# Patient Record
Sex: MALE | Race: WHITE | HISPANIC OR LATINO
[De-identification: names, ages, dates, MRNs, and addresses within clinical notes are randomized per-mention and may not be internally consistent; named-entity substitution may affect disease eponyms.]

---

## 2017-03-16 ENCOUNTER — APPOINTMENT (OUTPATIENT)
Dept: ELECTROPHYSIOLOGY | Facility: CLINIC | Age: 67
End: 2017-03-16

## 2017-06-20 ENCOUNTER — APPOINTMENT (OUTPATIENT)
Dept: ELECTROPHYSIOLOGY | Facility: CLINIC | Age: 67
End: 2017-06-20

## 2017-06-20 ENCOUNTER — NON-APPOINTMENT (OUTPATIENT)
Age: 67
End: 2017-06-20

## 2017-06-20 VITALS
BODY MASS INDEX: 21.7 KG/M2 | RESPIRATION RATE: 16 BRPM | HEART RATE: 42 BPM | SYSTOLIC BLOOD PRESSURE: 152 MMHG | HEIGHT: 71 IN | WEIGHT: 155 LBS | DIASTOLIC BLOOD PRESSURE: 86 MMHG | OXYGEN SATURATION: 98 %

## 2017-09-28 ENCOUNTER — APPOINTMENT (OUTPATIENT)
Dept: ELECTROPHYSIOLOGY | Facility: CLINIC | Age: 67
End: 2017-09-28
Payer: COMMERCIAL

## 2017-09-28 PROCEDURE — 93295 DEV INTERROG REMOTE 1/2/MLT: CPT

## 2017-09-28 PROCEDURE — 93296 REM INTERROG EVL PM/IDS: CPT

## 2017-12-14 ENCOUNTER — APPOINTMENT (OUTPATIENT)
Dept: ELECTROPHYSIOLOGY | Facility: CLINIC | Age: 67
End: 2017-12-14
Payer: COMMERCIAL

## 2017-12-14 VITALS — DIASTOLIC BLOOD PRESSURE: 89 MMHG | SYSTOLIC BLOOD PRESSURE: 165 MMHG | HEART RATE: 75 BPM

## 2017-12-14 DIAGNOSIS — Z86.74 PERSONAL HISTORY OF SUDDEN CARDIAC ARREST: ICD-10-CM

## 2017-12-14 PROCEDURE — 93282 PRGRMG EVAL IMPLANTABLE DFB: CPT

## 2018-03-12 ENCOUNTER — APPOINTMENT (OUTPATIENT)
Dept: ELECTROPHYSIOLOGY | Facility: CLINIC | Age: 68
End: 2018-03-12
Payer: COMMERCIAL

## 2018-03-12 PROCEDURE — 93296 REM INTERROG EVL PM/IDS: CPT

## 2018-03-12 PROCEDURE — 93295 DEV INTERROG REMOTE 1/2/MLT: CPT

## 2018-06-05 ENCOUNTER — APPOINTMENT (OUTPATIENT)
Dept: ELECTROPHYSIOLOGY | Facility: CLINIC | Age: 68
End: 2018-06-05
Payer: COMMERCIAL

## 2018-06-05 PROCEDURE — 93282 PRGRMG EVAL IMPLANTABLE DFB: CPT

## 2018-10-02 ENCOUNTER — APPOINTMENT (OUTPATIENT)
Dept: ELECTROPHYSIOLOGY | Facility: CLINIC | Age: 68
End: 2018-10-02
Payer: COMMERCIAL

## 2018-10-02 PROCEDURE — 93295 DEV INTERROG REMOTE 1/2/MLT: CPT

## 2018-10-02 PROCEDURE — 93296 REM INTERROG EVL PM/IDS: CPT

## 2018-12-11 ENCOUNTER — APPOINTMENT (OUTPATIENT)
Dept: ELECTROPHYSIOLOGY | Facility: CLINIC | Age: 68
End: 2018-12-11
Payer: COMMERCIAL

## 2018-12-11 PROCEDURE — 93282 PRGRMG EVAL IMPLANTABLE DFB: CPT

## 2019-03-28 ENCOUNTER — APPOINTMENT (OUTPATIENT)
Dept: ELECTROPHYSIOLOGY | Facility: CLINIC | Age: 69
End: 2019-03-28
Payer: COMMERCIAL

## 2019-03-28 PROCEDURE — 93295 DEV INTERROG REMOTE 1/2/MLT: CPT

## 2019-03-28 PROCEDURE — 93296 REM INTERROG EVL PM/IDS: CPT

## 2019-05-16 ENCOUNTER — MESSAGE (OUTPATIENT)
Age: 69
End: 2019-05-16

## 2019-06-07 ENCOUNTER — APPOINTMENT (OUTPATIENT)
Dept: ELECTROPHYSIOLOGY | Facility: CLINIC | Age: 69
End: 2019-06-07

## 2019-06-28 ENCOUNTER — APPOINTMENT (OUTPATIENT)
Dept: ELECTROPHYSIOLOGY | Facility: CLINIC | Age: 69
End: 2019-06-28
Payer: COMMERCIAL

## 2019-06-28 PROCEDURE — 93295 DEV INTERROG REMOTE 1/2/MLT: CPT

## 2019-06-28 PROCEDURE — 93296 REM INTERROG EVL PM/IDS: CPT

## 2019-09-27 ENCOUNTER — APPOINTMENT (OUTPATIENT)
Dept: ELECTROPHYSIOLOGY | Facility: CLINIC | Age: 69
End: 2019-09-27
Payer: COMMERCIAL

## 2019-09-27 PROCEDURE — 93296 REM INTERROG EVL PM/IDS: CPT

## 2019-09-27 PROCEDURE — 93295 DEV INTERROG REMOTE 1/2/MLT: CPT

## 2019-12-31 ENCOUNTER — APPOINTMENT (OUTPATIENT)
Dept: ELECTROPHYSIOLOGY | Facility: CLINIC | Age: 69
End: 2019-12-31
Payer: COMMERCIAL

## 2019-12-31 PROCEDURE — 93296 REM INTERROG EVL PM/IDS: CPT

## 2019-12-31 PROCEDURE — 93295 DEV INTERROG REMOTE 1/2/MLT: CPT

## 2020-04-03 ENCOUNTER — APPOINTMENT (OUTPATIENT)
Dept: ELECTROPHYSIOLOGY | Facility: CLINIC | Age: 70
End: 2020-04-03
Payer: COMMERCIAL

## 2020-04-03 PROCEDURE — 93296 REM INTERROG EVL PM/IDS: CPT

## 2020-04-03 PROCEDURE — 93295 DEV INTERROG REMOTE 1/2/MLT: CPT

## 2020-07-09 ENCOUNTER — APPOINTMENT (OUTPATIENT)
Dept: ELECTROPHYSIOLOGY | Facility: CLINIC | Age: 70
End: 2020-07-09
Payer: COMMERCIAL

## 2020-07-09 PROCEDURE — 93296 REM INTERROG EVL PM/IDS: CPT

## 2020-07-09 PROCEDURE — 93295 DEV INTERROG REMOTE 1/2/MLT: CPT

## 2020-10-09 ENCOUNTER — APPOINTMENT (OUTPATIENT)
Dept: ELECTROPHYSIOLOGY | Facility: CLINIC | Age: 70
End: 2020-10-09
Payer: COMMERCIAL

## 2020-10-09 PROCEDURE — 93295 DEV INTERROG REMOTE 1/2/MLT: CPT

## 2020-10-09 PROCEDURE — 93296 REM INTERROG EVL PM/IDS: CPT

## 2021-01-14 ENCOUNTER — APPOINTMENT (OUTPATIENT)
Dept: ELECTROPHYSIOLOGY | Facility: CLINIC | Age: 71
End: 2021-01-14
Payer: COMMERCIAL

## 2021-01-14 PROCEDURE — 93295 DEV INTERROG REMOTE 1/2/MLT: CPT

## 2021-01-14 PROCEDURE — 93296 REM INTERROG EVL PM/IDS: CPT

## 2021-03-04 ENCOUNTER — NON-APPOINTMENT (OUTPATIENT)
Age: 71
End: 2021-03-04

## 2021-03-04 ENCOUNTER — INPATIENT (INPATIENT)
Facility: HOSPITAL | Age: 71
LOS: 4 days | Discharge: HOME | End: 2021-03-09
Attending: INTERNAL MEDICINE | Admitting: INTERNAL MEDICINE
Payer: COMMERCIAL

## 2021-03-04 VITALS
SYSTOLIC BLOOD PRESSURE: 159 MMHG | DIASTOLIC BLOOD PRESSURE: 79 MMHG | HEIGHT: 72 IN | OXYGEN SATURATION: 99 % | TEMPERATURE: 98 F | HEART RATE: 75 BPM | RESPIRATION RATE: 16 BRPM

## 2021-03-04 DIAGNOSIS — I42.8 OTHER CARDIOMYOPATHIES: ICD-10-CM

## 2021-03-04 DIAGNOSIS — R62.7 ADULT FAILURE TO THRIVE: ICD-10-CM

## 2021-03-04 DIAGNOSIS — Z29.9 ENCOUNTER FOR PROPHYLACTIC MEASURES, UNSPECIFIED: ICD-10-CM

## 2021-03-04 DIAGNOSIS — Z95.810 PRESENCE OF AUTOMATIC (IMPLANTABLE) CARDIAC DEFIBRILLATOR: Chronic | ICD-10-CM

## 2021-03-04 DIAGNOSIS — I47.1 SUPRAVENTRICULAR TACHYCARDIA: ICD-10-CM

## 2021-03-04 LAB
ALBUMIN SERPL ELPH-MCNC: 4.7 G/DL — SIGNIFICANT CHANGE UP (ref 3.3–5)
ALP SERPL-CCNC: 43 U/L — SIGNIFICANT CHANGE UP (ref 40–120)
ALT FLD-CCNC: 20 U/L — SIGNIFICANT CHANGE UP (ref 4–41)
ANION GAP SERPL CALC-SCNC: 15 MMOL/L — HIGH (ref 7–14)
AST SERPL-CCNC: 23 U/L — SIGNIFICANT CHANGE UP (ref 4–40)
BASOPHILS # BLD AUTO: 0.08 K/UL — SIGNIFICANT CHANGE UP (ref 0–0.2)
BASOPHILS NFR BLD AUTO: 0.8 % — SIGNIFICANT CHANGE UP (ref 0–2)
BILIRUB SERPL-MCNC: 0.7 MG/DL — SIGNIFICANT CHANGE UP (ref 0.2–1.2)
BUN SERPL-MCNC: 18 MG/DL — SIGNIFICANT CHANGE UP (ref 7–23)
CALCIUM SERPL-MCNC: 9.5 MG/DL — SIGNIFICANT CHANGE UP (ref 8.4–10.5)
CHLORIDE SERPL-SCNC: 95 MMOL/L — LOW (ref 98–107)
CO2 SERPL-SCNC: 23 MMOL/L — SIGNIFICANT CHANGE UP (ref 22–31)
CREAT SERPL-MCNC: 1.02 MG/DL — SIGNIFICANT CHANGE UP (ref 0.5–1.3)
EOSINOPHIL # BLD AUTO: 0.01 K/UL — SIGNIFICANT CHANGE UP (ref 0–0.5)
EOSINOPHIL NFR BLD AUTO: 0.1 % — SIGNIFICANT CHANGE UP (ref 0–6)
GLUCOSE SERPL-MCNC: 105 MG/DL — HIGH (ref 70–99)
HCT VFR BLD CALC: 45.8 % — SIGNIFICANT CHANGE UP (ref 39–50)
HGB BLD-MCNC: 15.1 G/DL — SIGNIFICANT CHANGE UP (ref 13–17)
IANC: 7.45 K/UL — SIGNIFICANT CHANGE UP (ref 1.5–8.5)
IMM GRANULOCYTES NFR BLD AUTO: 0.3 % — SIGNIFICANT CHANGE UP (ref 0–1.5)
LYMPHOCYTES # BLD AUTO: 1.42 K/UL — SIGNIFICANT CHANGE UP (ref 1–3.3)
LYMPHOCYTES # BLD AUTO: 14.7 % — SIGNIFICANT CHANGE UP (ref 13–44)
MAGNESIUM SERPL-MCNC: 2.3 MG/DL — SIGNIFICANT CHANGE UP (ref 1.6–2.6)
MCHC RBC-ENTMCNC: 29.9 PG — SIGNIFICANT CHANGE UP (ref 27–34)
MCHC RBC-ENTMCNC: 33 GM/DL — SIGNIFICANT CHANGE UP (ref 32–36)
MCV RBC AUTO: 90.7 FL — SIGNIFICANT CHANGE UP (ref 80–100)
MONOCYTES # BLD AUTO: 0.64 K/UL — SIGNIFICANT CHANGE UP (ref 0–0.9)
MONOCYTES NFR BLD AUTO: 6.6 % — SIGNIFICANT CHANGE UP (ref 2–14)
NEUTROPHILS # BLD AUTO: 7.45 K/UL — HIGH (ref 1.8–7.4)
NEUTROPHILS NFR BLD AUTO: 77.5 % — HIGH (ref 43–77)
NRBC # BLD: 0 /100 WBCS — SIGNIFICANT CHANGE UP
NRBC # FLD: 0 K/UL — SIGNIFICANT CHANGE UP
PLATELET # BLD AUTO: 210 K/UL — SIGNIFICANT CHANGE UP (ref 150–400)
POTASSIUM SERPL-MCNC: 4.4 MMOL/L — SIGNIFICANT CHANGE UP (ref 3.5–5.3)
POTASSIUM SERPL-SCNC: 4.4 MMOL/L — SIGNIFICANT CHANGE UP (ref 3.5–5.3)
PROT SERPL-MCNC: 7.2 G/DL — SIGNIFICANT CHANGE UP (ref 6–8.3)
RBC # BLD: 5.05 M/UL — SIGNIFICANT CHANGE UP (ref 4.2–5.8)
RBC # FLD: 12.6 % — SIGNIFICANT CHANGE UP (ref 10.3–14.5)
SODIUM SERPL-SCNC: 133 MMOL/L — LOW (ref 135–145)
TROPONIN T, HIGH SENSITIVITY RESULT: 16 NG/L — SIGNIFICANT CHANGE UP
TSH SERPL-MCNC: 2.44 UIU/ML — SIGNIFICANT CHANGE UP (ref 0.27–4.2)
WBC # BLD: 9.63 K/UL — SIGNIFICANT CHANGE UP (ref 3.8–10.5)
WBC # FLD AUTO: 9.63 K/UL — SIGNIFICANT CHANGE UP (ref 3.8–10.5)

## 2021-03-04 PROCEDURE — 99223 1ST HOSP IP/OBS HIGH 75: CPT

## 2021-03-04 PROCEDURE — 99285 EMERGENCY DEPT VISIT HI MDM: CPT | Mod: 25,GC

## 2021-03-04 PROCEDURE — 93010 ELECTROCARDIOGRAM REPORT: CPT | Mod: NC

## 2021-03-04 PROCEDURE — 99233 SBSQ HOSP IP/OBS HIGH 50: CPT

## 2021-03-04 PROCEDURE — 93281 PM DEVICE PROGR EVAL MULTI: CPT | Mod: 26

## 2021-03-04 PROCEDURE — 71046 X-RAY EXAM CHEST 2 VIEWS: CPT | Mod: 26

## 2021-03-04 RX ORDER — METOPROLOL TARTRATE 50 MG
100 TABLET ORAL DAILY
Refills: 0 | Status: DISCONTINUED | OUTPATIENT
Start: 2021-03-04 | End: 2021-03-04

## 2021-03-04 RX ORDER — ATORVASTATIN CALCIUM 80 MG/1
10 TABLET, FILM COATED ORAL AT BEDTIME
Refills: 0 | Status: DISCONTINUED | OUTPATIENT
Start: 2021-03-04 | End: 2021-03-09

## 2021-03-04 RX ORDER — ASPIRIN/CALCIUM CARB/MAGNESIUM 324 MG
81 TABLET ORAL DAILY
Refills: 0 | Status: DISCONTINUED | OUTPATIENT
Start: 2021-03-04 | End: 2021-03-09

## 2021-03-04 RX ORDER — LISINOPRIL 2.5 MG/1
10 TABLET ORAL DAILY
Refills: 0 | Status: DISCONTINUED | OUTPATIENT
Start: 2021-03-04 | End: 2021-03-07

## 2021-03-04 RX ORDER — ENOXAPARIN SODIUM 100 MG/ML
40 INJECTION SUBCUTANEOUS DAILY
Refills: 0 | Status: DISCONTINUED | OUTPATIENT
Start: 2021-03-04 | End: 2021-03-09

## 2021-03-04 RX ORDER — METOPROLOL TARTRATE 50 MG
100 TABLET ORAL DAILY
Refills: 0 | Status: DISCONTINUED | OUTPATIENT
Start: 2021-03-04 | End: 2021-03-05

## 2021-03-04 NOTE — ED PROVIDER NOTE - CARE PLAN
Principal Discharge DX:	SVT (supraventricular tachycardia)   Principal Discharge DX:	SVT (supraventricular tachycardia)  Secondary Diagnosis:	AICD discharge

## 2021-03-04 NOTE — H&P ADULT - PROBLEM SELECTOR PLAN 3
-Pt w/ poor PO intake 2/2 to mood, recent life events  -mild hyponatremia   -encourage PO intake   -nutrition eval

## 2021-03-04 NOTE — H&P ADULT - ASSESSMENT
70yoM w/ PMHx NICM s/p single lead [place] [employer] AICD, HTN presents after AICD fire found to have episode of SVT w/ inappropriate therapy admitted for further eval

## 2021-03-04 NOTE — ED ADULT NURSE NOTE - OBJECTIVE STATEMENT
Abdi RN: Received pt in room 19, pt A&Ox4, respirations even and unlabored b/l. Pt reports felt a shock from defibrillator on 2/28, was told to come to ED by cardiologist. Pt c/o generalized weakness, states "but I'm not sure if it's new because I always feel weak." Pt denies chest pain, SOB, dizziness/lightheadedness, n/v. Pt with defibrillator. PMHx htn, cardiomyopathy, 38% EF, cardiac arrest. Sinus rhythm on cardiac monitor. Cardiology NP at bedside for eval. Awaiting orders. Appears in no apparent distress. Report endorsed to primary RN Sarah.

## 2021-03-04 NOTE — H&P ADULT - PROBLEM SELECTOR PLAN 2
Psychiatric Progress Note





- Psychiatric Progress Note


Patient seen today, length of contact: Patient evaluated, case discussed with 

team, chart reviewed


Patient Chief Complaint: 


"I'm okay."


Problems Identified/Issues Discussed: 


Patient reports less depression.   He has broader range of affect.  We 

discussed possible discharge on Monday if he continues to improve clinically.  

He reports improved appetite and sleep.  We discussed continued titration of 

Remeron, no current adverse effects to medications noted.


Medication Change: Yes (Increase Remeron to 30 mg PO HS)


Medical Record Reviewed: Yes


Consults ordered or reviewed: 


Medicine consult, Physical Therapy consult





Psychology consult 1/12/18





Pt is a 72 year old male admitted to the geropsych unit and referred to the 

writer for evaluation.  On the DRS, pt scored an overall score of 109. pt 

scored within normal limits on Attention, Construction and Conceptualization 

tasks.  Pt's Initiation and Memory skills fell in the Deficient Range.





Overall  109


Attention  34


Memory  15


Initiation  20


Construction  4


Conceptualization  34





Deficits evident with patient requiring assistance in the future,


thank you for this referral,


Dr. White





Mental Status Examination





- Cognitive Function


Orientation: Person, Place, Situation, Time


Memory: Impaired


Association: WNL


Fund of Knowledge: WNL


Decription of patient's judgement and insights: 


Fair I/J





- Mood


Mood: Depressed





- Affect


Affect: Depressed





- Speech


Speech: Appropriate





- Formal Thought Process


Formal Thought Process: No Impairment


Psychotic Thoughts and Behaviors: 


No AH/VH/paranoia/delusions





- Suicidal Ideation


Suicidal Ideation: No





- Homicidal Ideation


Homicidal Ideation: No





Goal/Treatment Plan





- Goal/Treatment Plan


Need for Continued Stay: Severe depression anxiety, Discharge may exacerbated 

symptoms


Progress Toward Problem(s) and Goals/Treatment Plan: 


Major Depressive Disorder, Severe; patient needs continued hospitalization for 

treatment and safety





-Individual and group therapy


-Increase Remeron to 30 mg PO HS 


-Medicine consult


-Physical therapy consult


-Psychology consult


-Disposition planning


-Collateral history obtained from patient's son; will start disposition 

planning w/ son


-Nicotine patch


Estimated Date of D/C: 01/22/18





- Smoking Cessation


Smoking Cessation Initiated: Yes -continue with lisinopril, metoprolol, statin  -F/u TTE

## 2021-03-04 NOTE — CONSULT NOTE ADULT - ASSESSMENT
70yoM w/ PMHx NICM s/p single lead [employer] AICD, HTN presents with s/p AICD fire presents after AICD fire on 2/28 and one episode of tachycardia today 3/4, broke after one successful ATP.  Patient states on 2/28, he woke up feeling short of breath and weak, when he stood up was when he felt the shock and has since felt very weak.  Remote monitoring alerted outpatient cardiology office today and patient was advised to go the ER.  Denies any changes in activity level, chest pain, lifestyle, medication, palpitations, lightheadedness, dizziness, LOC, fever, chills.  States his wife passed away about 1 year ago (not COVID related) and in recent weeks has seen a decrease in his appetite.     70yoM w/ PMHx NICM s/p single lead [place] [employer] AICD, HTN presents with s/p AICD fire presents after AICD fire on 2/28 and one episode of tachycardia today 3/4, broke after one successful ATP.  Patient states on 2/28, he woke up feeling short of breath and weak, when he stood up was when he felt the shock and has since felt very weak.  Remote monitoring alerted outpatient cardiology office today and patient was advised to go the ER.  Denies any changes in activity level, chest pain, lifestyle, medication, palpitations, lightheadedness, dizziness, LOC, fever, chills.  States his wife passed away about 1 year ago (not COVID related) and in recent weeks has seen a decrease in his appetite.      Interrogation revealed episodes of SVT with inappropriate therapy   SVT likely suspected to be afib/aflutter - would start anticoagulation for CHADs-VASC score: 3   TTE to evaluate valves and LV/RV function  Continue Toprol 100mg PO daily   Monitor on telemetry

## 2021-03-04 NOTE — ED ADULT TRIAGE NOTE - BP NONINVASIVE DIASTOLIC (MM HG)
79 [Maximal Pain Intensity: 7/10] : 7/10 [Least Pain Intensity: 3/10] : 3/10 [Pain Description/Quality: ___] : Pain description/quality: [unfilled] [Pain Duration: ___] : Pain duration: [unfilled] [Pain Location: ___] : Pain Location: [unfilled] [Pain Interferes with ADLs] : Pain interferes with activities of daily living. [Opioid] : opioid [Adjuvant (neuroleptics)] : adjuvant (neuroleptics) [70: Cares for self; unalbe to carry on normal activity or do active work.] : 70: Cares for self; unable to carry on normal activity or do active work. [ECOG Performance Status: 2 - Ambulatory and capable of all self care but unable to carry out any work activities] : Performance Status: 2 - Ambulatory and capable of all self care but unable to carry out any work activities. Up and about more than 50% of waking hours

## 2021-03-04 NOTE — ED ADULT TRIAGE NOTE - CHIEF COMPLAINT QUOTE
Per EMS Pt w/ hx of htn cardiac arrest ,cardiomyopathy,  Pt's defibrillator alerted cardiologist to arrhythmia Pt denies palpitations chest pain SOB, c/o general unwell feeling.

## 2021-03-04 NOTE — CONSULT NOTE ADULT - SUBJECTIVE AND OBJECTIVE BOX
Date of Admission: 3/4/2021    CHIEF COMPLAINT: AICD fire    HISTORY OF PRESENT ILLNESS:  This is a 70yoM w/ PMHx NICM s/p single lead [employer] [employer] AICD, HTN presents with s/p AICD fire presents after AICD fire on 2/28 and one episode of tachycardia today 3/4, broke after one successful ATP.  Patient states on 2/28, he woke up feeling short of breath and weak, when he stood up was when he felt the shock and has since felt very weak.  Remote monitoring alerted outpatient cardiology office today and patient was advised to go the ER.  Denies any changes in activity level, chest pain, lifestyle, medication, palpitations, lightheadedness, dizziness, LOC, fever, chills.  States his wife passed away about 1 year ago (not COVID related) and in recent weeks has seen a decrease in his appetite.      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  Has been maintained on toprol 100mg PO since.  One episode of tachycardia that met requirements for tachy therapy noted on remote telemetry in 2019 for which patient was notified but patient has not followed up with outpatient clinic since 2017.      Allergies  No Known Allergies  Intolerances    	    MEDICATIONS:                  PAST MEDICAL & SURGICAL HISTORY:      FAMILY HISTORY:      SOCIAL HISTORY:    [ ] Non-smoker  [ ] Smoker  [ ] Alcohol      REVIEW OF SYSTEMS:  See HPI. Otherwise, 10 point ROS done and otherwise negative.    PHYSICAL EXAM:  T(C): 36.9 (03-04-21 @ 16:01), Max: 37.1 (03-04-21 @ 15:55)  HR: 78 (03-04-21 @ 16:01) (75 - 88)  BP: 174/87 (03-04-21 @ 16:01) (159/79 - 175/88)  RR: 18 (03-04-21 @ 16:01) (16 - 18)  SpO2: 100% (03-04-21 @ 16:01) (99% - 100%)  Wt(kg): --  I&O's Summary      Appearance: Normal	  HEENT:   Normal oral mucosa, PERRL, EOMI	  Lymphatic: No lymphadenopathy  Cardiovascular: Normal S1 S2, No JVD, No murmurs, No edema  Respiratory: Lungs clear to auscultation	  Psychiatry: A & O x 3, Mood & affect appropriate  Gastrointestinal:  Soft, Non-tender, + BS	  Skin: No rashes, No ecchymoses, No cyanosis	  Neurologic: Non-focal  Extremities: Normal range of motion, No clubbing, cyanosis or edema  Vascular: Peripheral pulses palpable 2+ bilaterally        LABS:	 	    CBC Full  -  ( 04 Mar 2021 16:29 )  WBC Count : 9.63 K/uL  Hemoglobin : 15.1 g/dL  Hematocrit : 45.8 %  Platelet Count - Automated : 210 K/uL  Mean Cell Volume : 90.7 fL  Mean Cell Hemoglobin : 29.9 pg  Mean Cell Hemoglobin Concentration : 33.0 gm/dL  Auto Neutrophil # : 7.45 K/uL  Auto Lymphocyte # : 1.42 K/uL  Auto Monocyte # : 0.64 K/uL  Auto Eosinophil # : 0.01 K/uL  Auto Basophil # : 0.08 K/uL  Auto Neutrophil % : 77.5 %  Auto Lymphocyte % : 14.7 %  Auto Monocyte % : 6.6 %  Auto Eosinophil % : 0.1 %  Auto Basophil % : 0.8 %            proBNP:   Lipid Profile:   HgA1c:   TSH:       CARDIAC MARKERS:            TELEMETRY: 	    ECG:  	  RADIOLOGY:  OTHER: 	    PREVIOUS DIAGNOSTIC TESTING:    [ ] Echocardiogram:  [ ]  Catheterization:  [ ] Stress Test:  	  	  ASSESSMENT/PLAN: 	     Date of Admission: 3/4/2021    CHIEF COMPLAINT: AICD fire    HISTORY OF PRESENT ILLNESS:  This is a 70yoM w/ PMHx NICM s/p single lead [employer] AICD, HTN presents with s/p AICD fire presents after AICD fire on 2/28 and one episode of tachycardia today 3/4, broke after one successful ATP.  Patient states on 2/28, he woke up feeling short of breath and weak, when he stood up was when he felt the shock and has since felt very weak.  Remote monitoring alerted outpatient cardiology office today and patient was advised to go the ER.  Denies any changes in activity level, chest pain, lifestyle, medication, palpitations, lightheadedness, dizziness, LOC, fever, chills.  States his wife passed away about 1 year ago (not COVID related) and in recent weeks has seen a decrease in his appetite.      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  Has been maintained on toprol 100mg PO since.  One episode of tachycardia that met requirements for tachy therapy noted on remote telemetry in 2019 for which patient was notified but patient has not followed up with outpatient clinic since 2017.      Allergies  No Known Allergies    MEDICATIONS:    PAST MEDICAL & SURGICAL HISTORY:    FAMILY HISTORY:    REVIEW OF SYSTEMS:  See HPI. Otherwise, 10 point ROS done and otherwise negative.    PHYSICAL EXAM:  T(C): 36.9 (03-04-21 @ 16:01), Max: 37.1 (03-04-21 @ 15:55)  HR: 78 (03-04-21 @ 16:01) (75 - 88)  BP: 174/87 (03-04-21 @ 16:01) (159/79 - 175/88)  RR: 18 (03-04-21 @ 16:01) (16 - 18)  SpO2: 100% (03-04-21 @ 16:01) (99% - 100%)  Wt(kg): --  I&O's Summary      Appearance: Normal	  HEENT:   Normal oral mucosa, PERRL, EOMI	  Lymphatic: No lymphadenopathy  Cardiovascular: Normal S1 S2, No JVD, 3+ systolic murmur, No edema  Respiratory: Lungs clear to auscultation	  Psychiatry: A & O x 3, Mood & affect appropriate  Gastrointestinal:  Soft, Non-tender, + BS	  Skin: No rashes, No ecchymoses, No cyanosis	  Neurologic: Non-focal  Extremities: Normal range of motion, No clubbing, cyanosis or edema  Vascular: Peripheral pulses palpable 2+ bilaterally    LABS:	 	  CBC Full  -  ( 04 Mar 2021 16:29 )  WBC Count : 9.63 K/uL  Hemoglobin : 15.1 g/dL  Hematocrit : 45.8 %  Platelet Count - Automated : 210 K/uL  Mean Cell Volume : 90.7 fL  Mean Cell Hemoglobin : 29.9 pg  Mean Cell Hemoglobin Concentration : 33.0 gm/dL  Auto Neutrophil # : 7.45 K/uL  Auto Lymphocyte # : 1.42 K/uL  Auto Monocyte # : 0.64 K/uL  Auto Eosinophil # : 0.01 K/uL  Auto Basophil # : 0.08 K/uL  Auto Neutrophil % : 77.5 %  Auto Lymphocyte % : 14.7 %  Auto Monocyte % : 6.6 %  Auto Eosinophil % : 0.1 %  Auto Basophil % : 0.8 %    < from: Transthoracic Echocardiogram (07.13.13 @ 10:08) >  CONCLUSIONS:  1. Normal left ventricular internal dimensions and wall  thicknesses.  2. Endocardium not well visualized; at least moderate left  ventricular dysfunction.    < end of copied text >

## 2021-03-04 NOTE — H&P ADULT - NSHPPHYSICALEXAM_GEN_ALL_CORE
GENERAL APPEARANCE: Cachectic, alert and cooperative. NAD.   HEENT:  PERRL, EOMI. External auditory canals normal, hearing grossly intact.  NECK: Neck supple, non-tender without lymphadenopathy, masses or thyromegaly.  CARDIAC: Normal S1 and S2. No S3, S4 or murmurs. Rhythm is irregularly irregular   LUNGS: Clear to auscultation and percussion without rales, rhonchi, wheezing or diminished breath sounds.  ABDOMEN: Positive bowel sounds. Soft, nondistended, nontender. No guarding or rebound.   MUSCULOSKELETAL: ROM intact spine and extremities. No joint erythema or tenderness.   BACK: normal posture, no spinal deformity, symmetry of spinal muscles, without tenderness, decreased range of motion.  EXTREMITIES: No significant deformity or joint abnormality. No edema. Peripheral pulses intact. No varicosities.  NEUROLOGICAL: CN II-XII intact. Strength and sensation symmetric and intact throughout.   SKIN: Skin normal color, texture and turgor with no lesions or eruptions.  PSYCHIATRIC: AOx3.Normal affect and behavior.

## 2021-03-04 NOTE — H&P ADULT - NSHPLABSRESULTS_GEN_ALL_CORE
(03-04 @ 16:29)                      15.1  9.63 )-----------( 210                 45.8    Neutrophils = 7.45 (77.5%)  Lymphocytes = 1.42 (14.7%)  Eosinophils = 0.01 (0.1%)  Basophils = 0.08 (0.8%)  Monocytes = 0.64 (6.6%)  Bands = --%    03-04    133<L>  |  95<L>  |  18  ----------------------------<  105<H>  4.4   |  23  |  1.02    Ca    9.5      04 Mar 2021 16:29  Mg     2.3     03-04    TPro  7.2  /  Alb  4.7  /  TBili  0.7  /  DBili  x   /  AST  23  /  ALT  20  /  AlkPhos  43  03-04    < from: Xray Chest 2 Views PA/Lat (03.04.21 @ 17:28) >      IMPRESSION:  Clear lungs.      < end of copied text >        Labs and imaging reviewed  EKG: NSR w/ PVCs, no acute ST changes

## 2021-03-04 NOTE — H&P ADULT - PROBLEM SELECTOR PLAN 1
-Pt w/ episodes of SVT w/ shock and ATP  -SVT suspected to be afib/aflutter  -appreciate EP consult  -CHADSVASc score of 3, benefits likely outweigh risks. Extensive discussion with patient at bedside. He is hesistant about starting anticoagulation despite understanding risks for stroke. He would like to hold off overnight until he can discuss it further with outpatient cardiologist. Will start on aspirin for now  -continue with metoprolol  -will likely need to be started on antiarrythmic as per EP -Pt w/ episodes of SVT w/ shock and ATP  -SVT suspected to be afib/aflutter  -appreciate EP consult  -CHADSVASc score of 3, benefits likely outweigh risks. Extensive discussion with patient at bedside. He is hesistant about starting anticoagulation despite understanding risks for stroke. He would like to hold off overnight until he can discuss it further with outpatient cardiologist. Will start on aspirin for now  -continue with metoprolol  -will likely need to be started on antiarrythmic as per EP  -TTE to evaluate valves and LV/RV function

## 2021-03-04 NOTE — PROCEDURE NOTE - INTERROGATION NOTE: COMMENTS
Single chamber ICD in VVI mode; normal sensing and pacing via iterative testing; excellent threshold capture; patient with AICD shock on 2/28 and SVT with ATP; no reprogramming

## 2021-03-04 NOTE — H&P ADULT - HISTORY OF PRESENT ILLNESS
70yoM w/ PMHx NICM s/p single lead [place] [employer] AICD, HTN presents after AICD fire on 2/28 and one episode of tachycardia today 3/4, broke after one successful ATP.  Patient states on 2/28, he woke up feeling short of breath and weak, felt a shock when he stood up and has since felt very weak.  Remote monitoring alerted outpatient cardiology office today and patient was advised to go the ER.  Denies any changes in activity level, chest pain, palpitations, lightheadedness, dizziness, LOC, fever, chills, LE edema.  States his wife passed away about 1 year ago (not COVID related) and in recent weeks has seen a decrease in his appetite.     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  Has been maintained on toprol 100mg PO since.  One episode of tachycardia that met requirements for tachy therapy noted on remote telemetry in 2019 for which patient was notified but patient has not followed up with outpatient clinic since 2017.    Pt had AICD interrogated in the ED, demonstrated episodes of SVT  (likely a fib, a flutter) with inappropriate therapy.

## 2021-03-04 NOTE — H&P ADULT - NSHPREVIEWOFSYSTEMS_GEN_ALL_CORE
CONSTITUTIONAL:  +weakness No weight loss, fever, chills  HEENT:  Eyes:  No visual loss, blurred vision, double vision or yellow sclerae. Ears, Nose, Throat:  No hearing loss, sneezing, congestion, runny nose or sore throat.  SKIN:  No rash or itching.  CARDIOVASCULAR: +palpitations No chest pain, chest pressure or chest discomfort. No edema.  RESPIRATORY:  No shortness of breath, cough or sputum.  GASTROINTESTINAL: +anorexia No nausea, vomiting or diarrhea. No abdominal pain or blood.  GENITOURINARY:  Denies hematuria, dysuria.   NEUROLOGICAL:  No headache, dizziness, syncope, paralysis, ataxia, numbness or tingling in the extremities. No change in bowel or bladder control.  MUSCULOSKELETAL:  No muscle, back pain, joint pain or stiffness.  HEMATOLOGIC:  No anemia, bleeding or bruising.  LYMPHATICS:  No enlarged nodes. No history of splenectomy.  PSYCHIATRIC:  No history of depression or anxiety.  ENDOCRINOLOGIC:  No reports of sweating, cold or heat intolerance. No polyuria or polydipsia.  ALLERGIES:  No history of asthma, hives, eczema or rhinitis.

## 2021-03-04 NOTE — ED PROVIDER NOTE - ATTENDING CONTRIBUTION TO CARE
70M h/o CM, AICD, poor F/u.  PT was called and advised to come to hospital due to AICD fired.  Pt felt a shock last week, had palps today, in [place] had another shock, EP says pt having SVT.  Pt off amio x 5 yrs, on lopressor.  Currently feeling a bit weak, some palps and CP but denies SOB.  D/w EP they recc admission for cardiology workup.  Last cath was 8 ya.  VS:  unremarkable except HTN    GEN - NAD;  malaise,   A+O x3   HEAD - NC/AT     ENT - PEERL, EOMI, mucous membranes    moist , no discharge      NECK: Neck supple, non-tender without lymphadenopathy, no masses, no JVD  PULM - CTA b/l,  symmetric breath sounds  COR -  normal heart sounds    ABD - , ND, NT, soft,  BACK - no CVA tenderness, nontender spine     EXTREMS - no edema, no deformity, warm and well perfused    SKIN - no rash    or bruising      NEUROLOGIC - alert, face symmetric, speech fluent, sensation nl, motor no focal deficit.

## 2021-03-04 NOTE — ED PROVIDER NOTE - CLINICAL SUMMARY MEDICAL DECISION MAKING FREE TEXT BOX
Patient is a 69 y/o Male with a PMHx of MI c/b VTach and cardiac arrest in 2013 (s/p implantation ICD) and PVC p/w 5d of "feeling lousy" after his ICD discharged at home on 06:00 on 2/28 -- interrogation revealing SVT -- labs, troponin, admit

## 2021-03-04 NOTE — ED PROVIDER NOTE - OBJECTIVE STATEMENT
Patient is a 69 y/o Male with a PMHx of MI c/b VTach and cardiac arrest in 2013 (s/p implantation ICD) and PVC p/w 5d of "feeling lousy" after his ICD discharged at home on 06:00 on 2/28. Patient states that he woke up experiencing chest palpitations consistent with those he has experienced during prior episodes of PVC. The patient attempted to walk to the fridge for a glass of water when the device discharged; he was able to seat himself without falling or any other trauma. He denies any LOC at that time. The patient reports that he was contacted by his cardiologist ([facility]) who informed him of the remote read of his ICD and referred him to the ED.    The patient reports that his chest is sore from the device discharge and that he is continuing to experience "palpitations," but denies any HA, dizziness, SOB, other CP, fever or chills at this time.

## 2021-03-05 ENCOUNTER — TRANSCRIPTION ENCOUNTER (OUTPATIENT)
Age: 71
End: 2021-03-05

## 2021-03-05 LAB
ANION GAP SERPL CALC-SCNC: 10 MMOL/L — SIGNIFICANT CHANGE UP (ref 7–14)
APTT BLD: 29.6 SEC — SIGNIFICANT CHANGE UP (ref 27–36.3)
BUN SERPL-MCNC: 16 MG/DL — SIGNIFICANT CHANGE UP (ref 7–23)
CALCIUM SERPL-MCNC: 9.1 MG/DL — SIGNIFICANT CHANGE UP (ref 8.4–10.5)
CHLORIDE SERPL-SCNC: 98 MMOL/L — SIGNIFICANT CHANGE UP (ref 98–107)
CHOLEST SERPL-MCNC: 171 MG/DL — SIGNIFICANT CHANGE UP
CO2 SERPL-SCNC: 24 MMOL/L — SIGNIFICANT CHANGE UP (ref 22–31)
CREAT SERPL-MCNC: 0.99 MG/DL — SIGNIFICANT CHANGE UP (ref 0.5–1.3)
GLUCOSE SERPL-MCNC: 97 MG/DL — SIGNIFICANT CHANGE UP (ref 70–99)
HCT VFR BLD CALC: 42.2 % — SIGNIFICANT CHANGE UP (ref 39–50)
HCV AB S/CO SERPL IA: 0.08 S/CO — SIGNIFICANT CHANGE UP (ref 0–0.99)
HCV AB SERPL-IMP: SIGNIFICANT CHANGE UP
HDLC SERPL-MCNC: 76 MG/DL — SIGNIFICANT CHANGE UP
HGB BLD-MCNC: 14.3 G/DL — SIGNIFICANT CHANGE UP (ref 13–17)
INR BLD: 1.12 RATIO — SIGNIFICANT CHANGE UP (ref 0.88–1.16)
LIPID PNL WITH DIRECT LDL SERPL: 80 MG/DL — SIGNIFICANT CHANGE UP
MAGNESIUM SERPL-MCNC: 2.1 MG/DL — SIGNIFICANT CHANGE UP (ref 1.6–2.6)
MCHC RBC-ENTMCNC: 30.2 PG — SIGNIFICANT CHANGE UP (ref 27–34)
MCHC RBC-ENTMCNC: 33.9 GM/DL — SIGNIFICANT CHANGE UP (ref 32–36)
MCV RBC AUTO: 89.2 FL — SIGNIFICANT CHANGE UP (ref 80–100)
NON HDL CHOLESTEROL: 95 MG/DL — SIGNIFICANT CHANGE UP
NRBC # BLD: 0 /100 WBCS — SIGNIFICANT CHANGE UP
NRBC # FLD: 0 K/UL — SIGNIFICANT CHANGE UP
PHOSPHATE SERPL-MCNC: 3.2 MG/DL — SIGNIFICANT CHANGE UP (ref 2.5–4.5)
PLATELET # BLD AUTO: 175 K/UL — SIGNIFICANT CHANGE UP (ref 150–400)
POTASSIUM SERPL-MCNC: 3.9 MMOL/L — SIGNIFICANT CHANGE UP (ref 3.5–5.3)
POTASSIUM SERPL-SCNC: 3.9 MMOL/L — SIGNIFICANT CHANGE UP (ref 3.5–5.3)
PROTHROM AB SERPL-ACNC: 12.8 SEC — SIGNIFICANT CHANGE UP (ref 10.6–13.6)
RBC # BLD: 4.73 M/UL — SIGNIFICANT CHANGE UP (ref 4.2–5.8)
RBC # FLD: 12.7 % — SIGNIFICANT CHANGE UP (ref 10.3–14.5)
SARS-COV-2 RNA SPEC QL NAA+PROBE: SIGNIFICANT CHANGE UP
SODIUM SERPL-SCNC: 132 MMOL/L — LOW (ref 135–145)
TRIGL SERPL-MCNC: 75 MG/DL — SIGNIFICANT CHANGE UP
WBC # BLD: 9.35 K/UL — SIGNIFICANT CHANGE UP (ref 3.8–10.5)
WBC # FLD AUTO: 9.35 K/UL — SIGNIFICANT CHANGE UP (ref 3.8–10.5)

## 2021-03-05 PROCEDURE — 93306 TTE W/DOPPLER COMPLETE: CPT | Mod: 26

## 2021-03-05 RX ORDER — SOTALOL HCL 120 MG
80 TABLET ORAL EVERY 12 HOURS
Refills: 0 | Status: DISCONTINUED | OUTPATIENT
Start: 2021-03-05 | End: 2021-03-07

## 2021-03-05 RX ADMIN — Medication 80 MILLIGRAM(S): at 22:13

## 2021-03-05 RX ADMIN — ENOXAPARIN SODIUM 40 MILLIGRAM(S): 100 INJECTION SUBCUTANEOUS at 10:59

## 2021-03-05 RX ADMIN — LISINOPRIL 10 MILLIGRAM(S): 2.5 TABLET ORAL at 05:20

## 2021-03-05 RX ADMIN — Medication 100 MILLIGRAM(S): at 11:00

## 2021-03-05 RX ADMIN — Medication 81 MILLIGRAM(S): at 11:00

## 2021-03-05 NOTE — PROGRESS NOTE ADULT - ASSESSMENT
70yoM w/ PMHx NICM s/p single lead [employer] AICD, HTN presents after AICD fire found to have episode of SVT w/ inappropriate therapy admitted for further eval      Problem/Plan - 1:  ·  Problem: SVT (supraventricular tachycardia).  Plan: -Pt w/ episodes of SVT w/ shock and ATP  -SVT suspected to be afib/aflutter  -appreciate EP help.  -Refusing AC and said will D/W EP .   -continue with metoprolol  -TTE to evaluate valves and LV/RV function.     Problem/Plan - 2:  ·  Problem: Nonischemic cardiomyopathy.  Plan: -continue with lisinopril, metoprolol, statin  -F/u TTE.   - Cards helping .    Problem/Plan - 3:  ·  Problem: Failure to thrive in adult.  Plan: -Pt w/ poor PO intake 2/2 to mood, recent life events  -mild hyponatremia   -encourage PO intake   -nutrition eval.      Problem/Plan - 4:  ·  Problem: Need for prophylactic measure.  Plan: DVT ppx: lovenox.

## 2021-03-05 NOTE — DISCHARGE NOTE PROVIDER - CARE PROVIDERS DIRECT ADDRESSES
,DirectAddress_Unknown ,DirectAddress_Unknown,[email or web address]

## 2021-03-05 NOTE — DISCHARGE NOTE PROVIDER - NSDCCPCAREPLAN_GEN_ALL_CORE_FT
PRINCIPAL DISCHARGE DIAGNOSIS  Diagnosis: SVT (supraventricular tachycardia)  Assessment and Plan of Treatment: EP following. PPM interrogated and functiong normal  ECHO-------------------------------  follow up with cariologist      SECONDARY DISCHARGE DIAGNOSES  Diagnosis: Nonischemic cardiomyopathy  Assessment and Plan of Treatment: Nonischemic cardiomyopathy    Diagnosis: Failure to thrive in adult  Assessment and Plan of Treatment: continue with lisinopril, metoprolol, statin     PRINCIPAL DISCHARGE DIAGNOSIS  Diagnosis: SVT (supraventricular tachycardia)  Assessment and Plan of Treatment: EP following. PPM interrogated and functiong normal  ECHO- LV/RV function WNL and no valvular dysfunction   follow up with  your cardiologist Dr. Aguilar in 1 week. Call for an appointment.        SECONDARY DISCHARGE DIAGNOSES  Diagnosis: Atrial fibrillation  Assessment and Plan of Treatment: It was recommended that you start anticoagulationat this time, however, you have declined continue Aspirin  Continue taking your Sotolol as prescribed.      Diagnosis: AICD discharge  Assessment and Plan of Treatment: You have an appointment with Mert Maldonado MD on 4/15 at 07:45 AM after discharge

## 2021-03-05 NOTE — DISCHARGE NOTE PROVIDER - HOSPITAL COURSE
70yoM w/ PMHx NICM s/p single lead [employer] AICD, HTN presents after AICD fire found to have episode of SVT w/ inappropriate therapy admitted for further eval.     SVT (supraventricular tachycardia).    -Pt w/ episodes of SVT w/ shock and ATP  -SVT suspected to be afib/aflutter  -appreciate EP consult  -CHADSVASc score of 3, benefits likely outweigh risks. Extensive discussion with patient at bedside. He is hesistant about starting anticoagulation despite understanding risks for stroke. He would like to hold off overnight until he can discuss it further with outpatient cardiologist. Will start on aspirin for now  -continue with metoprolol  -will likely need to be started on antiarrythmic as per EP  -TTE to evaluate valves and LV/RV function.    Nonischemic cardiomyopathy.    -continue with lisinopril, metoprolol, statin  -F/u TTE.     Failure to thrive in adult.    -Pt w/ poor PO intake 2/2 to mood, recent life events  -mild hyponatremia   -encourage PO intake   -nutrition eval.    70yoM w/ PMHx NICM s/p single lead [employer] AICD, HTN presents after AICD fire found to have episode of SVT w/ inappropriate therapy admitted for further eval.     SVT (supraventricular tachycardia).    -Pt w/ episodes of SVT w/ shock and ATP  -SVT suspected to be afib/aflutter  -appreciate EP consult  -CHADSVASc score of 3, benefits likely outweigh risks. Extensive discussion with patient at bedside. He is hesistant about starting anticoagulation despite understanding risks for stroke. He would like to hold off overnight until he can discuss it further with outpatient cardiologist. Will start on aspirin for now  -continue with metoprolol  -will likely need to be started on antiarrythmic as per EP  -ECHO- EF 59%. Bileaflet mitral valve prolapse. Mild-moderate mitral regurgitation. Calcified trileaflet aortic valve with normal opening.Mild aortic regurgitation. Mildly dilated left atrium.  LA volume index = 38 cc/m2. Normal left ventricular internal dimensions and wall thicknesses. Normal left ventricular systolic function. No segmental wall motion abnormalities. Mild diastolic dysfunction (Stage I). Normal right ventricular size and function.  A device wire is noted in the right heart.    Nonischemic cardiomyopathy.    -continue with lisinopril, metoprolol, statin         70yoM w/ PMHx NICM s/p single lead [employer] AICD, HTN presents after AICD fire found to have episode of SVT w/ inappropriate therapy admitted for further eval.     SVT (supraventricular tachycardia).    -Pt w/ episodes of SVT w/ shock and ATP  -SVT suspected to be afib/aflutter  -appreciate EP consult  -CHADSVASc score of 3, benefits likely outweigh risks. Extensive discussion with patient at bedside. He is hesistant about starting anticoagulation despite understanding risks for stroke. He would like to hold off overnight until he can discuss it further with outpatient cardiologist. Will start on aspirin for now  -continue with metoprolol  -will likely need to be started on antiarrythmic as per EP  -ECHO- EF 59%. Bileaflet mitral valve prolapse. Mild-moderate mitral regurgitation. Calcified trileaflet aortic valve with normal opening.Mild aortic regurgitation. Mildly dilated left atrium.  LA volume index = 38 cc/m2. Normal left ventricular internal dimensions and wall thicknesses. Normal left ventricular systolic function. No segmental wall motion abnormalities. Mild diastolic dysfunction (Stage I). Normal right ventricular size and function.  A device wire is noted in the right heart.    Nonischemic cardiomyopathy.    -continue with lisinopril, metoprolol, statin        On 3/9/21, discussed with Dr. Kemp, patient is medically cleared and optimized for discharge today. All medications were reviewed with attending, and sent to mutually agreed upon pharmacy.

## 2021-03-05 NOTE — PROGRESS NOTE ADULT - ASSESSMENT
71 yo Male with PMH of HTN and NICM s/p single lead [employer] AICD. Patient presented after AICD fire on 2/28 and one episode of tachycardia on 3/4 which broke after one successful ATP.  He states on 2/28, he woke feeling SOB/fatigue and 'felt a shock', remote monitoring alerted outpatient cardiology office and patient was advised to go the ER.    ICD interrogation revealed episodes of SVT with inappropriate therapy, SVT likely suspected to be AFib/AFlutter (CHADs-VASC score: 3). Lengthy discussion with patient regarding benefits of AC, Cardiology also reviewed importance. However, patient refusing AC at this time and states he will follow up with his cardiologist on an outpatient basis.        TTE completed today LV/RV function WNL and no valvular dysfunction noted.        69 yo Male with PMH of HTN and NICM s/p single lead ICD ([employer]). Patient presented after AICD fire on 2/28 and one episode of tachycardia on 3/4 which broke after one successful ATP.  He states on 2/28, he woke feeling SOB/fatigue and 'felt a shock', remote monitoring alerted outpatient cardiology office and patient was advised to go the ER.    ICD interrogation revealed episodes of SVT with inappropriate therapy, SVT likely suspected to be AFib/AFlutter (CHADs-VASC score: 3). Lengthy discussion with patient regarding benefits of AC, Cardiology also reviewed importance. However, patient refusing AC at this time and states he will follow up with his cardiologist on an outpatient basis.        TTE completed today LV/RV function WNL and no valvular dysfunction noted.        71 yo Male with PMH of HTN and NICM s/p single lead ICD ([employer]). Patient presented after AICD fire on 2/28 and one episode of tachycardia on 3/4 which broke after one successful ATP.  He states on 2/28, he woke feeling SOB/fatigue and 'felt a shock', remote monitoring alerted outpatient cardiology office and patient was advised to go the ER.    ICD interrogation revealed episodes of SVT with inappropriate therapy, SVT likely suspected to be AFib/AFlutter (CHADs-VASC score: 3). Lengthy discussion with patient regarding benefits of AC, Cardiology also reviewed importance. However, patient refusing AC at this time and states he will follow up with his cardiologist on an outpatient basis.        3/5 TTE completed today LV/RV function WNL and no valvular dysfunction noted (full report above).    Continue telemetry monitoring.  Metoprolol ER 100mg daily; discussion with patient to switch to Sotalol  CHADVASC Score= 3  Patient refusing AC at this time, will continue Aspirin  Monitor lytes and replete K>4.0 and Mg>2.0  Appreciate Cardiology recommendations  Management per primary team

## 2021-03-05 NOTE — CONSULT NOTE ADULT - SUBJECTIVE AND OBJECTIVE BOX
CARDIOLOGY CONSULT - Dr. Kemp         HPI:  70yoM w/ PMHx MI, NICM s/p single lead [employer] AICD, HTN presents after AICD fire on 2/28 and one episode of tachycardia on 3/4, broke after one successful ATP.  Patient states on 2/28, he woke up feeling short of breath and weak, felt a shock when he stood up and has since felt very weak.  Remote monitoring alerted outpatient cardiology office today and patient was advised to go the ER.  Denies any changes in activity level, chest pain, palpitations, lightheadedness, dizziness, LOC, fever, chills, LE edema.  States his wife passed away about 1 year ago (not COVID related) and in recent weeks has seen a decrease in his appetite.     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  Has been maintained on toprol 100mg PO since.  One episode of tachycardia that met requirements for tachy therapy noted on remote telemetry in 2019 for which patient was notified but patient has not followed up with outpatient clinic since 2017.  Pt had AICD interrogated in the ED, demonstrated episodes of SVT with inappropriate therapy.   on exam continues to deny cp, sob, palps  ROS otherwise negative         PAST MEDICAL & SURGICAL HISTORY:  Nonischemic cardiomyopathy    S/P implantation of automatic cardioverter/defibrillator (AICD)            PREVIOUS DIAGNOSTIC TESTING:    [ x] Echocardiogram:  < from: Transthoracic Echocardiogram (07.13.13 @ 10:08) >  OBSERVATIONS:  Mitral Valve: Mitral annular calcification and calcified  mitral leaflets  Aortic Root: Normal aortic root.  Aortic Valve: Calcified trileaflet aortic valve with normal  opening.  Left Atrium: Normal left atrium  Left Ventricle: Normal left ventricular internal dimensions  and wall thicknesses.  Endocardium not well visualized; at least moderate left  ventricular dysfunction.  (DT:249 ms).  Right Heart: Normal right atrium.  Normal right ventricular size and function.  Normal tricuspid valve.  Normal pulmonic valve.  Pericardium/PleuraNormal pericardium with no pericardial  effusion.  ------------------------------------------------------------------------  CONCLUSIONS:  1. Normal left ventricular internal dimensions and wall  thicknesses.  2. Endocardium not well visualized; at least moderate left  ventricular dysfunction.  ------------------------------------------------------------------------  Confirmed on  7/13/2013 - 14:35:52 by Romana Quiñones M.D.  ------------------------------------------------------------------------    < end of copied text >    [ x]  Catheterization:  < from: Cardiac Cath Lab (07.18.13 @ 15:12) >  LM:   --  LM: Normal.  LAD:   --  LAD: Normal.  CX:   --  Circumflex: Normal.  RCA:   --  RCA: Normal.  COMPLICATIONS: There were no complications.  DIAGNOSTIC RECOMMENDATIONS:  1. Coronary angiography revealed no significant CAD.  2. Medical management and risk factor optimization.  INTERVENTIONAL RECOMMENDATIONS:  1. Coronary angiography revealed no significant CAD.  2. Medical management and risk factor optimization.  Prepared and signed by  Toya Karimi M.D.    < end of copied text >    [ ] Stress Test:  	    MEDICATIONS:  Home Medications:  aspirin 81 mg oral tablet, chewable: 1 tab(s) orally once a day (04 Mar 2021 21:38)  Lipitor 10 mg oral tablet: 10 milligram(s) orally every other day (04 Mar 2021 21:38)  lisinopril 10 mg oral tablet: 1 tab(s) orally once a day (04 Mar 2021 21:38)  metoprolol tartrate 100 mg oral tablet: 100 milligram(s) orally once a day (04 Mar 2021 21:38)      MEDICATIONS  (STANDING):  aspirin  chewable 81 milliGRAM(s) Oral daily  atorvastatin 10 milliGRAM(s) Oral at bedtime  enoxaparin Injectable 40 milliGRAM(s) SubCutaneous daily  lisinopril 10 milliGRAM(s) Oral daily  metoprolol succinate  milliGRAM(s) Oral daily      FAMILY HISTORY:  No pertinent family history in first degree relatives        SOCIAL HISTORY:    [ x] Non-smoker  [ ] Smoker  [ ] Alcohol    Allergies    No Known Allergies    Intolerances    	    REVIEW OF SYSTEMS:  CONSTITUTIONAL: No fever, weight loss, or fatigue  EYES: No eye pain, visual disturbances, or discharge  ENMT:  No difficulty hearing, tinnitus, vertigo; No sinus or throat pain  NECK: No pain or stiffness  RESPIRATORY: No cough, wheezing, chills or hemoptysis; No Shortness of Breath  CARDIOVASCULAR:  see hpi   GASTROINTESTINAL: No abdominal or epigastric pain. No nausea, vomiting, or hematemesis; No diarrhea or constipation. No melena or hematochezia.  GENITOURINARY: No dysuria, frequency, hematuria, or incontinence  NEUROLOGICAL: No headaches, memory loss, loss of strength, numbness, or tremors  SKIN: No itching, burning, rashes, or lesions   	    [ x] All others negative	  [ ] Unable to obtain    PHYSICAL EXAM:  T(C): 36.8 (03-05-21 @ 10:54), Max: 37.1 (03-04-21 @ 15:55)  HR: 77 (03-05-21 @ 10:54) (70 - 88)  BP: 132/72 (03-05-21 @ 10:54) (121/65 - 175/88)  RR: 18 (03-05-21 @ 10:54) (16 - 18)  SpO2: 100% (03-05-21 @ 10:54) (100% - 100%)  Wt(kg): --  I&O's Summary      Appearance: Normal	  Psychiatry: A & O x 3, Mood & affect appropriate  HEENT:   Normal oral mucosa, PERRL, EOMI	  Lymphatic: No lymphadenopathy  Cardiovascular: Normal S1 S2,RRR, No JVD, No murmurs  Respiratory: Lungs clear to auscultation	  Gastrointestinal:  Soft, Non-tender, + BS	  Skin: No rashes, No ecchymoses, No cyanosis	  Neurologic: Non-focal  Extremities: Normal range of motion, No clubbing, cyanosis or edema  Vascular: Peripheral pulses palpable 2+ bilaterally    TELEMETRY: 	    ECG:  	  RADIOLOGY:  < from: Xray Chest 2 Views PA/Lat (03.04.21 @ 17:28) >  FINDINGS:  Left-sided AICD in place.  Heart size is normal.  Clear lungs.  The visualized osseous structures demonstrate no acute pathology.      IMPRESSION:  Clear lungs.        < end of copied text >    OTHER: 	  	  LABS:	 	    CARDIAC MARKERS:  Troponin T, High Sensitivity Result: 16 ng/L (03-04 @ 16:29)                                  14.3   9.35  )-----------( 175      ( 05 Mar 2021 05:46 )             42.2     03-05    132<L>  |  98  |  16  ----------------------------<  97  3.9   |  24  |  0.99    Ca    9.1      05 Mar 2021 05:43  Phos  3.2     03-05  Mg     2.1     03-05    TPro  7.2  /  Alb  4.7  /  TBili  0.7  /  DBili  x   /  AST  23  /  ALT  20  /  AlkPhos  43  03-04    PT/INR - ( 05 Mar 2021 05:46 )   PT: 12.8 sec;   INR: 1.12 ratio         PTT - ( 05 Mar 2021 05:46 )  PTT:29.6 sec  proBNP:   Lipid Profile:   HgA1c:   TSH: Thyroid Stimulating Hormone, Serum: 2.44 uIU/mL (03-04 @ 16:29)

## 2021-03-05 NOTE — DISCHARGE NOTE PROVIDER - NSDCMRMEDTOKEN_GEN_ALL_CORE_FT
aspirin 81 mg oral tablet, chewable: 1 tab(s) orally once a day  Lipitor 10 mg oral tablet: 10 milligram(s) orally every other day  lisinopril 10 mg oral tablet: 1 tab(s) orally once a day  metoprolol tartrate 100 mg oral tablet: 100 milligram(s) orally once a day   aspirin 81 mg oral tablet, chewable: 1 tab(s) orally once a day  Lipitor 10 mg oral tablet: 10 milligram(s) orally every other day  lisinopril 10 mg oral tablet: 1 tab(s) orally once a day  metoprolol tartrate 100 mg oral tablet: 100 milligram(s) orally once a day  sotalol 80 mg oral tablet: 1 tab(s) orally every 12 hours   aspirin 81 mg oral tablet, chewable: 1 tab(s) orally once a day  Lipitor 10 mg oral tablet: 10 milligram(s) orally every other day  lisinopril 10 mg oral tablet: 1 tab(s) orally once a day  sotalol 80 mg oral tablet: 1 tab(s) orally every 12 hours

## 2021-03-05 NOTE — DISCHARGE NOTE PROVIDER - CARE PROVIDER_API CALL
Jorge Kemp)  Cardiology  [address], [address]  [place] [address]  Phone: [phone]  Fax: [phone]  Follow Up Time:    Jorge Kemp (MD)  Cardiology  [address], [address]  [place] [address]  Phone: [phone]  Fax: [phone]  Follow Up Time:     Jose Aguilar)  Cardiovascular Disease; Internal Medicine  [address], [address]  [place] [address]  Phone: [phone]  Fax: [phone]  Follow Up Time:

## 2021-03-05 NOTE — CONSULT NOTE ADULT - ASSESSMENT
ECHO 7/13/2013: at least mod LV dysfx   Cath 7/18/2013: no significant CAD    a/p   70yoM w/ PMHx MI, NICM s/p single lead [employer] AICD, HTN presents after AICD     1. SVT   -s/p ICD interrogation 3/4/21: patient with AICD shock on 2/28 and SVT with ATP; no reprogramming  -EP f/u noted : SVT likely suspected to be afib/aflutter   -start anticoagulation for CHADs-VASC score: 3   TTE to evaluate valves and LV/RV function  Continue Toprol 100mg PO daily  ECHO 7/13/2013: at least mod LV dysfx   Cath 7/18/2013: no significant CAD    a/p   70yoM w/ PMHx MI, NICM s/p single lead [employer] AICD, HTN presents after AICD     1. SVT / Pafib/ aflutter   -s/p ICD interrogation 3/4/21: patient with AICD shock on 2/28 and SVT with ATP; no reprogramming  -EP f/u noted : SVT likely suspected to be afib/aflutter   -start anticoagulation for CHADs-VASC score: 3   - Pending TTE to evaluate valves and LV/RV function  -Continue Toprol 100mg PO daily     2. Chronic Systolic CHF, NICMP s/p AICD  -doesnt  appear overloaded on exam   -c/w BB, ace  -low dose asa     dvt ppx

## 2021-03-05 NOTE — PROGRESS NOTE ADULT - SUBJECTIVE AND OBJECTIVE BOX
Date of Service  : 03-05-21 @ 16:28    INTERVAL HPI/OVERNIGHT EVENTS: I feel okay . seen and examined earlier today   Vital Signs Last 24 Hrs  T(C): 36.7 (05 Mar 2021 14:52), Max: 36.8 (04 Mar 2021 22:07)  T(F): 98 (05 Mar 2021 14:52), Max: 98.3 (04 Mar 2021 22:07)  HR: 67 (05 Mar 2021 14:52) (67 - 77)  BP: 146/81 (05 Mar 2021 14:52) (121/65 - 156/83)  BP(mean): --  RR: 17 (05 Mar 2021 14:52) (16 - 18)  SpO2: 99% (05 Mar 2021 14:52) (99% - 100%)  I&O's Summary    MEDICATIONS  (STANDING):  aspirin  chewable 81 milliGRAM(s) Oral daily  atorvastatin 10 milliGRAM(s) Oral at bedtime  enoxaparin Injectable 40 milliGRAM(s) SubCutaneous daily  lisinopril 10 milliGRAM(s) Oral daily  metoprolol succinate  milliGRAM(s) Oral daily    MEDICATIONS  (PRN):    LABS:                        14.3   9.35  )-----------( 175      ( 05 Mar 2021 05:46 )             42.2     03-05    132<L>  |  98  |  16  ----------------------------<  97  3.9   |  24  |  0.99    Ca    9.1      05 Mar 2021 05:43  Phos  3.2     03-05  Mg     2.1     03-05    TPro  7.2  /  Alb  4.7  /  TBili  0.7  /  DBili  x   /  AST  23  /  ALT  20  /  AlkPhos  43  03-04    PT/INR - ( 05 Mar 2021 05:46 )   PT: 12.8 sec;   INR: 1.12 ratio         PTT - ( 05 Mar 2021 05:46 )  PTT:29.6 sec    CAPILLARY BLOOD GLUCOSE              REVIEW OF SYSTEMS:  CONSTITUTIONAL: No fever, weight loss, or fatigue  EYES: No eye pain, visual disturbances, or discharge  ENMT:  No difficulty hearing, tinnitus, vertigo; No sinus or throat pain  NECK: No pain or stiffness  RESPIRATORY: No cough, wheezing, chills or hemoptysis; No shortness of breath  CARDIOVASCULAR: No chest pain, palpitations, dizziness, or leg swelling  GASTROINTESTINAL: No abdominal or epigastric pain. No nausea, vomiting, or hematemesis; No diarrhea or constipation. No melena or hematochezia.  GENITOURINARY: No dysuria, frequency, hematuria, or incontinence  NEUROLOGICAL: No headaches, memory loss, loss of strength, numbness, or tremors  SKIN: No itching, burning, rashes, or lesions   ENDOCRINE: No heat or cold intolerance; No hair loss  MUSCULOSKELETAL: No joint pain or swelling; No muscle, back, or extremity pain  PSYCHIATRIC: No depression, anxiety, mood swings, or difficulty sleeping  HEME/LYMPH: No easy bruising, or bleeding gums  ALLERY AND IMMUNOLOGIC: No hives or eczema    RADIOLOGY & ADDITIONAL TESTS:    Consultant(s) Notes Reviewed:  [x ] YES  [ ] NO    PHYSICAL EXAM:  GENERAL: NAD, well-groomed, well-developed,not in any distress ,  HEAD:  Atraumatic, Normocephalic  EYES: EOMI, PERRLA, conjunctiva and sclera clear  ENMT: No tonsillar erythema, exudates, or enlargement; Moist mucous membranes, Good dentition, No lesions  NECK: Supple, No JVD, Normal thyroid  NERVOUS SYSTEM:  Alert & Oriented X3, No focal deficit   CHEST/LUNG: Good air entry bilateral with no  rales, rhonchi, wheezing, or rubs  HEART: Regular rate and rhythm; No murmurs, rubs, or gallops  ABDOMEN: Soft, Nontender, Nondistended; Bowel sounds present  EXTREMITIES:  2+ Peripheral Pulses, No clubbing, cyanosis, or edema  SKIN: No rashes or lesions    Care Discussed with Consultants/Other Providers [ x] YES  [ ] NO

## 2021-03-05 NOTE — PROGRESS NOTE ADULT - SUBJECTIVE AND OBJECTIVE BOX
Interval History:  Patient resting comfortably in bed   Denies CP/SOB/palpitations/dizziness.  No acute events overnight.    Medications:  aspirin  chewable 81 milliGRAM(s) Oral daily  atorvastatin 10 milliGRAM(s) Oral at bedtime  enoxaparin Injectable 40 milliGRAM(s) SubCutaneous daily  lisinopril 10 milliGRAM(s) Oral daily  metoprolol succinate  milliGRAM(s) Oral daily      Vitals:  T(C): 36.7 (03-05-21 @ 14:52), Max: 36.8 (03-04-21 @ 22:07)  HR: 67 (03-05-21 @ 14:52) (67 - 77)  BP: 146/81 (03-05-21 @ 14:52) (121/65 - 156/83)  BP(mean): --  RR: 17 (03-05-21 @ 14:52) (16 - 18)  SpO2: 99% (03-05-21 @ 14:52) (99% - 100%)      Weight (kg): 74.8 (03-05 @ 14:52)    I&O's Summary      Physical Exam:  Appearance: No Acute Distress  Cardiovascular: Normal S1 S2, No murmurs/rubs/gallops  Respiratory: Clear to auscultation bilaterally  Gastrointestinal: Soft, Non-tender	  Skin: No cyanosis	  Neurologic: Non-focal  Extremities: No LE edema  Psychiatry: A & O x 3, Mood & affect appropriate    Labs:                        14.3   9.35  )-----------( 175      ( 05 Mar 2021 05:46 )             42.2     03-05    132<L>  |  98  |  16  ----------------------------<  97  3.9   |  24  |  0.99    Ca    9.1      05 Mar 2021 05:43  Phos  3.2     03-05  Mg     2.1     03-05    TPro  7.2  /  Alb  4.7  /  TBili  0.7  /  DBili  x   /  AST  23  /  ALT  20  /  AlkPhos  43  03-04    PT/INR - ( 05 Mar 2021 05:46 )   PT: 12.8 sec;   INR: 1.12 ratio         PTT - ( 05 Mar 2021 05:46 )  PTT:29.6 sec      TELEMETRY: Sinus Rhythm with occasional PVCs        Echocardiogram:  < from: Transthoracic Echocardiogram (03.05.21 @ 14:30) >  ------------------------------------------------------------------------  DIMENSIONS:  Dimensions:     Normal Values:  LA:     4.5 cm    2.0 - 4.0 cm  Ao:     3.8 cm    2.0 - 3.8 cm  SEPTUM: 0.9 cm    0.6 - 1.2 cm  PWT:    0.9cm    0.6 - 1.1 cm  LVIDd:  5.7 cm    3.0 - 5.6 cm  LVIDs:  3.9 cm    1.8 - 4.0 cm  Derived Variables:  LVMI: 99 g/m2  RWT: 0.31  Fractional short: 32 %  Ejection Fraction (Teicholtz): 59 %  ------------------------------------------------------------------------  OBSERVATIONS:  Mitral Valve: Bileaflet mitral valve prolapse.  Mild-moderate mitral regurgitation.  Aortic Root: Normal aortic root.  Aortic Valve: Calcified trileaflet aortic valve with normal  opening. Mild aortic regurgitation.  LeftAtrium: Mildly dilated left atrium.  LA volume index =  38 cc/m2.  Left Ventricle: Normal left ventricular systolic function.  No segmental wall motion abnormalities. Normal left  ventricular internal dimensions and wall thicknesses. Mild  diastolic dysfunction (Stage I).  Right Heart: Normal right atrium. Normal right ventricular  size and function.  A device wire is noted in the right  heart. Normal tricuspid valve. Mild tricuspid  regurgitation. Normal pulmonic valve. Minimal pulmonic  regurgitation.  Pericardium/PleuraNormal pericardium with no pericardial  effusion.  ------------------------------------------------------------------------  CONCLUSIONS:  1. Bileaflet mitral valve prolapse. Mild-moderate mitral  regurgitation.  2. Calcified trileaflet aortic valve with normal opening.  Mild aortic regurgitation.  3. Mildly dilated left atrium.  LA volume index = 38 cc/m2.  4. Normal left ventricular internal dimensions and wall  thicknesses.  5. Normal left ventricular systolic function. No segmental  wall motion abnormalities.  6. Mild diastolic dysfunction (Stage I).  7. Normal right ventricular size and function.  A device  wire is noted in the right heart.

## 2021-03-06 RX ADMIN — Medication 80 MILLIGRAM(S): at 22:07

## 2021-03-06 RX ADMIN — ENOXAPARIN SODIUM 40 MILLIGRAM(S): 100 INJECTION SUBCUTANEOUS at 12:28

## 2021-03-06 RX ADMIN — Medication 80 MILLIGRAM(S): at 10:09

## 2021-03-06 RX ADMIN — LISINOPRIL 10 MILLIGRAM(S): 2.5 TABLET ORAL at 05:08

## 2021-03-06 RX ADMIN — Medication 81 MILLIGRAM(S): at 12:27

## 2021-03-06 NOTE — PROGRESS NOTE ADULT - ASSESSMENT
69 yo Male with PMH of HTN and NICM s/p single lead ICD ([employer]). Patient presented after AICD fire on 2/28 and one episode of tachycardia on 3/4 which broke after one successful ATP.   ICD interrogation revealed episodes of SVT with inappropriate therapy, SVT likely suspected to be AFib/AFlutter (CHADs-VASC score: 3). Lengthy discussion with patient regarding benefits of AC, Cardiology also reviewed importance. However, patient refusing AC at this time and states he will follow up with his cardiologist on an outpatient basis.        3/5 TTE completed today LV/RV function WNL and no valvular dysfunction noted (full report above).      - continue Metoprolol ER 100mg daily; discussion with patient to switch to Sotalol  - CHADVASC Score= 3  - Patient refusing AC at this time, will continue Aspirin  - Monitor lytes and replete K>4.0 and Mg>2.0  - Appreciate Cardiology recommendations  - Management per primary team         71 yo Male with PMH of HTN and NICM s/p single lead ICD ([employer]). Patient presented after AICD fire on 2/28 and one episode of tachycardia on 3/4 which broke after one successful ATP.   ICD interrogation revealed episodes of SVT with inappropriate therapy, SVT likely suspected to be AFib/AFlutter (CHADs-VASC score: 3). Lengthy discussion with patient regarding benefits of AC, Cardiology also reviewed importance. However, patient refusing AC at this time and states he will follow up with his cardiologist on an outpatient basis.        3/5 TTE completed today LV/RV function WNL and no valvular dysfunction noted (full report above).      - continue Sotalol 80mg BID, EKG 2 hours post dose  - CHADVASC Score= 3  - Patient refusing AC at this time, will continue Aspirin  - Monitor lytes and replete K>4.0 and Mg>2.0  - Appreciate Cardiology recommendations  - Management per primary team

## 2021-03-06 NOTE — DIETITIAN INITIAL EVALUATION ADULT. - PERTINENT MEDS FT
MEDICATIONS  (STANDING):  aspirin  chewable 81 milliGRAM(s) Oral daily  atorvastatin 10 milliGRAM(s) Oral at bedtime  enoxaparin Injectable 40 milliGRAM(s) SubCutaneous daily  lisinopril 10 milliGRAM(s) Oral daily  sotalol 80 milliGRAM(s) Oral every 12 hours

## 2021-03-06 NOTE — DIETITIAN INITIAL EVALUATION ADULT. - OTHER INFO
Pt reports PO intake >50% of breakfast today. Denies any GI issues (nausea/vomiting/diarrhea/constipation.) Denies any chewing or swallowing difficulties at this time. NKFA. Denies any acute weight changes. UBW reported 165 pounds.

## 2021-03-06 NOTE — PROGRESS NOTE ADULT - ASSESSMENT
ECHO 7/13/2013: at least mod LV dysfx   Cath 7/18/2013: no significant CAD  ECHO 3/5/21: EF 59%, Bileaflet mitral valve prolapse. Mild-moderate mitral regurgitation. Mild diastolic dysfunction (Stage I). NL LV sys fx      a/p   70yoM w/ PMHx MI, NICM s/p single lead [employer] AICD, HTN presents after AICD     1. SVT / Pafib/ aflutter   -s/p ICD interrogation 3/4/21: patient with AICD shock on 2/28 and SVT with ATP; no reprogramming  -EP f/u noted : SVT likely suspected to be afib/aflutter   -A/c recommend for CHADs-VASC score: 3 : Patient now agrees, but would like to discuss with EP, Ep to follow up  -ECHO 3/5/21: EF 59%, Bileaflet mitral valve prolapse. Mild-moderate mitral regurgitation. Mild diastolic dysfunction (Stage I). NL LV sys fx    -Sotalol, per EP , ecg noted     2. Chronic Systolic CHF, NICMP s/p AICD  -volume status stable, no dyspnea   -Echo with nl LV sys fx   -c/w BB, acei  -low dose asa     dvt ppx   DCP once cleared BY EP    patient will follow up his outp cards

## 2021-03-06 NOTE — PROGRESS NOTE ADULT - SUBJECTIVE AND OBJECTIVE BOX
Date of Service  : 03-06-21 @ 17:14    INTERVAL HPI/OVERNIGHT EVENTS: I feel fine.   Vital Signs Last 24 Hrs  T(C): 36.6 (06 Mar 2021 13:02), Max: 36.9 (05 Mar 2021 22:07)  T(F): 97.8 (06 Mar 2021 13:02), Max: 98.4 (05 Mar 2021 22:07)  HR: 61 (06 Mar 2021 13:02) (60 - 66)  BP: 128/90 (06 Mar 2021 13:02) (128/90 - 148/75)  BP(mean): --  RR: 18 (06 Mar 2021 13:02) (17 - 18)  SpO2: 100% (06 Mar 2021 13:02) (99% - 100%)  I&O's Summary    MEDICATIONS  (STANDING):  aspirin  chewable 81 milliGRAM(s) Oral daily  atorvastatin 10 milliGRAM(s) Oral at bedtime  enoxaparin Injectable 40 milliGRAM(s) SubCutaneous daily  lisinopril 10 milliGRAM(s) Oral daily  sotalol 80 milliGRAM(s) Oral every 12 hours    MEDICATIONS  (PRN):    LABS:                        14.3   9.35  )-----------( 175      ( 05 Mar 2021 05:46 )             42.2     03-05    132<L>  |  98  |  16  ----------------------------<  97  3.9   |  24  |  0.99    Ca    9.1      05 Mar 2021 05:43  Phos  3.2     03-05  Mg     2.1     03-05      PT/INR - ( 05 Mar 2021 05:46 )   PT: 12.8 sec;   INR: 1.12 ratio         PTT - ( 05 Mar 2021 05:46 )  PTT:29.6 sec    CAPILLARY BLOOD GLUCOSE              REVIEW OF SYSTEMS:  CONSTITUTIONAL: No fever, weight loss, or fatigue  EYES: No eye pain, visual disturbances, or discharge  ENMT:  No difficulty hearing, tinnitus, vertigo; No sinus or throat pain  NECK: No pain or stiffness  RESPIRATORY: No cough, wheezing, chills or hemoptysis; No shortness of breath  CARDIOVASCULAR: No chest pain, palpitations, dizziness, or leg swelling  GASTROINTESTINAL: No abdominal or epigastric pain. No nausea, vomiting, or hematemesis; No diarrhea or constipation. No melena or hematochezia.  GENITOURINARY: No dysuria, frequency, hematuria, or incontinence  NEUROLOGICAL: No headaches, memory loss, loss of strength, numbness, or tremors      Consultant(s) Notes Reviewed:  [x ] YES  [ ] NO    PHYSICAL EXAM:  GENERAL: NAD, well-groomed, well-developed,not in any distress ,  HEAD:  Atraumatic, Normocephalic  EYES: EOMI, PERRLA, conjunctiva and sclera clear  ENMT: No tonsillar erythema, exudates, or enlargement; Moist mucous membranes, Good dentition, No lesions  NECK: Supple, No JVD, Normal thyroid  NERVOUS SYSTEM:  Alert & Oriented X3, No focal deficit   CHEST/LUNG: Good air entry bilateral with no  rales, rhonchi, wheezing, or rubs  HEART: Regular rate and rhythm; No murmurs, rubs, or gallops  ABDOMEN: Soft, Nontender, Nondistended; Bowel sounds present  EXTREMITIES:  2+ Peripheral Pulses, No clubbing, cyanosis, or edema  SKIN: No rashes or lesions    Care Discussed with Consultants/Other Providers [ x] YES  [ ] NO

## 2021-03-06 NOTE — DIETITIAN INITIAL EVALUATION ADULT. - PROBLEM SELECTOR PROBLEM 4
----- Message from Gibson Perry MD sent at 2/7/2020 10:30 AM CST -----  Urine culture shows infection with organism sensitive to antibiotic pt is taking.  Finish and fu with pcp as directed.    
Patient was informed.  
Need for prophylactic measure

## 2021-03-06 NOTE — PROGRESS NOTE ADULT - ASSESSMENT
70yoM w/ PMHx NICM s/p single lead [employer] AICD, HTN presents after AICD fire found to have episode of SVT w/ inappropriate therapy admitted for further eval      Problem/Plan - 1:  ·  Problem: SVT (supraventricular tachycardia).  Plan: -Pt w/ episodes of SVT w/ shock and ATP  -SVT suspected to be afib/aflutter  -appreciate EP help.Started on Sotalol   -Refusing AC and said will D/W EP .   -continue with metoprolol  -  < from: Transthoracic Echocardiogram (03.05.21 @ 14:30) >  CONCLUSIONS:  1. Bileaflet mitral valve prolapse. Mild-moderate mitral  regurgitation.  2. Calcified trileaflet aortic valve with normal opening.  Mild aortic regurgitation.  3. Mildly dilated left atrium.  LA volume index = 38 cc/m2.  4. Normal left ventricular internal dimensions and wall  thicknesses.  5. Normal left ventricular systolic function. No segmental  wall motion abnormalities.  6. Mild diastolic dysfunction (Stage I).  7. Normal right ventricular size and function.  A device  wire is noted in the right heart.  -----------------------------------------    < end of copied text >     Problem/Plan - 2:  ·  Problem: Nonischemic cardiomyopathy.  Plan: -continue with lisinopril, metoprolol, statin  -F/u TTE.   - Cards helping .    Problem/Plan - 3:  ·  Problem: Failure to thrive in adult.  Plan: -Pt w/ poor PO intake 2/2 to mood, recent life events  -mild hyponatremia   -encourage PO intake   -nutrition eval.      Problem/Plan - 4:  ·  Problem: Need for prophylactic measure.  Plan: DVT ppx: lovenox.

## 2021-03-06 NOTE — DIETITIAN INITIAL EVALUATION ADULT. - PROBLEM SELECTOR PLAN 1
-Pt w/ episodes of SVT w/ shock and ATP  -SVT suspected to be afib/aflutter  -appreciate EP consult  -CHADSVASc score of 3, benefits likely outweigh risks. Extensive discussion with patient at bedside. He is hesistant about starting anticoagulation despite understanding risks for stroke. He would like to hold off overnight until he can discuss it further with outpatient cardiologist. Will start on aspirin for now  -continue with metoprolol  -will likely need to be started on antiarrythmic as per EP  -TTE to evaluate valves and LV/RV function

## 2021-03-06 NOTE — PROGRESS NOTE ADULT - SUBJECTIVE AND OBJECTIVE BOX
Patient seen and examined at bedside.    Overnight Events:     Review Of Systems: No chest pain, shortness of breath, or palpitations            Current Meds:  aspirin  chewable 81 milliGRAM(s) Oral daily  atorvastatin 10 milliGRAM(s) Oral at bedtime  enoxaparin Injectable 40 milliGRAM(s) SubCutaneous daily  lisinopril 10 milliGRAM(s) Oral daily  sotalol 80 milliGRAM(s) Oral every 12 hours      Vitals:  T(F): 98 (03-06), Max: 98.4 (03-05)  HR: 60 (03-06) (60 - 77)  BP: 148/75 (03-06) (132/72 - 148/75)  RR: 18 (03-06)  SpO2: 100% (03-06)  I&O's Summary      Physical Exam:  Appearance: No acute distress; well appearing  HEENT:  EOMI, sclera anicteric, Normal oral mucosa  Cardiovascular: RRR, S1, S2, no murmurs, rubs, or gallops; no edema; no JVD  Respiratory: Clear to auscultation bilaterally, no wheezes, rales, rhonchi  Gastrointestinal: soft, non-tender, non-distended with normal bowel sounds  Musculoskeletal: No clubbing; no joint deformity   Neurologic: Non-focal  Lymphatic: No lymphadenopathy  Psychiatry: AAOx3, mood & affect appropriate  Skin: No rashes, ecchymoses, or cyanosis                          14.3   9.35  )-----------( 175      ( 05 Mar 2021 05:46 )             42.2     03-05    132<L>  |  98  |  16  ----------------------------<  97  3.9   |  24  |  0.99    Ca    9.1      05 Mar 2021 05:43  Phos  3.2     03-05  Mg     2.1     03-05    TPro  7.2  /  Alb  4.7  /  TBili  0.7  /  DBili  x   /  AST  23  /  ALT  20  /  AlkPhos  43  03-04    PT/INR - ( 05 Mar 2021 05:46 )   PT: 12.8 sec;   INR: 1.12 ratio         PTT - ( 05 Mar 2021 05:46 )  PTT:29.6 sec  CARDIAC MARKERS ( 04 Mar 2021 16:29 )  16 ng/L / x     / x     / x     / x     / x          Echo:  CONCLUSIONS:  1. Bileaflet mitral valve prolapse. Mild-moderate mitral  regurgitation.  2. Calcified trileaflet aortic valve with normal opening.  Mild aortic regurgitation.  3. Mildly dilated left atrium.  LA volume index = 38 cc/m2.  4. Normal left ventricular internal dimensions and wall  thicknesses.  5. Normal left ventricular systolic function. No segmental  wall motion abnormalities.  6. Mild diastolic dysfunction (Stage I).  7. Normal right ventricular size and function.  A device  wire is noted in the right heart.    Stress Testing:     Cath:    Imaging:    Interpretation of Telemetry: Patient seen and examined at bedside.    Overnight Events: NAEO, sotalol started overnight, patient tolerated it well.    Review Of Systems: No chest pain, shortness of breath, or palpitations            Current Meds:  aspirin  chewable 81 milliGRAM(s) Oral daily  atorvastatin 10 milliGRAM(s) Oral at bedtime  enoxaparin Injectable 40 milliGRAM(s) SubCutaneous daily  lisinopril 10 milliGRAM(s) Oral daily  sotalol 80 milliGRAM(s) Oral every 12 hours      Vitals:  T(F): 98 (03-06), Max: 98.4 (03-05)  HR: 60 (03-06) (60 - 77)  BP: 148/75 (03-06) (132/72 - 148/75)  RR: 18 (03-06)  SpO2: 100% (03-06)  I&O's Summary      Physical Exam:  Appearance: No acute distress; well appearing  HEENT:  EOMI, sclera anicteric, Normal oral mucosa  Cardiovascular: RRR, S1, S2, no murmurs, rubs, or gallops; no edema; no JVD  Respiratory: Clear to auscultation bilaterally, no wheezes, rales, rhonchi  Gastrointestinal: soft, non-tender, non-distended with normal bowel sounds  Musculoskeletal: No clubbing; no joint deformity   Neurologic: Non-focal  Lymphatic: No lymphadenopathy  Psychiatry: AAOx3, mood & affect appropriate  Skin: No rashes, ecchymoses, or cyanosis                          14.3   9.35  )-----------( 175      ( 05 Mar 2021 05:46 )             42.2     03-05    132<L>  |  98  |  16  ----------------------------<  97  3.9   |  24  |  0.99    Ca    9.1      05 Mar 2021 05:43  Phos  3.2     03-05  Mg     2.1     03-05    TPro  7.2  /  Alb  4.7  /  TBili  0.7  /  DBili  x   /  AST  23  /  ALT  20  /  AlkPhos  43  03-04    PT/INR - ( 05 Mar 2021 05:46 )   PT: 12.8 sec;   INR: 1.12 ratio         PTT - ( 05 Mar 2021 05:46 )  PTT:29.6 sec  CARDIAC MARKERS ( 04 Mar 2021 16:29 )  16 ng/L / x     / x     / x     / x     / x          Echo:  CONCLUSIONS:  1. Bileaflet mitral valve prolapse. Mild-moderate mitral  regurgitation.  2. Calcified trileaflet aortic valve with normal opening.  Mild aortic regurgitation.  3. Mildly dilated left atrium.  LA volume index = 38 cc/m2.  4. Normal left ventricular internal dimensions and wall  thicknesses.  5. Normal left ventricular systolic function. No segmental  wall motion abnormalities.  6. Mild diastolic dysfunction (Stage I).  7. Normal right ventricular size and function.  A device  wire is noted in the right heart.    Interpretation of Telemetry: sinus 80s, PVCs

## 2021-03-06 NOTE — DIETITIAN INITIAL EVALUATION ADULT. - ORAL INTAKE PTA/DIET HISTORY
Per H&P, pt with "poor PO intake 2/2 mood, recent life events." Pt declined nutrition assessment stating he is going home soon.

## 2021-03-06 NOTE — PROGRESS NOTE ADULT - SUBJECTIVE AND OBJECTIVE BOX
CARDIOLOGY FOLLOW UP - Dr. Kemp    CC no cp or sob        PHYSICAL EXAM:  T(C): 36.7 (03-06-21 @ 05:06), Max: 36.9 (03-05-21 @ 22:07)  HR: 60 (03-06-21 @ 05:06) (60 - 77)  BP: 148/75 (03-06-21 @ 05:06) (132/72 - 148/75)  RR: 18 (03-06-21 @ 05:06) (17 - 18)  SpO2: 100% (03-06-21 @ 05:06) (99% - 100%)  Wt(kg): --  I&O's Summary      Appearance: Normal	  Cardiovascular: Normal S1 S2,RRR, No JVD, No murmurs  Respiratory: Lungs clear to auscultation	  Gastrointestinal:  Soft, Non-tender, + BS	  Extremities: Normal range of motion, No clubbing, cyanosis or edema      Home Medications:  aspirin 81 mg oral tablet, chewable: 1 tab(s) orally once a day (04 Mar 2021 21:38)  Lipitor 10 mg oral tablet: 10 milligram(s) orally every other day (04 Mar 2021 21:38)  lisinopril 10 mg oral tablet: 1 tab(s) orally once a day (04 Mar 2021 21:38)  metoprolol tartrate 100 mg oral tablet: 100 milligram(s) orally once a day (04 Mar 2021 21:38)      MEDICATIONS  (STANDING):  aspirin  chewable 81 milliGRAM(s) Oral daily  atorvastatin 10 milliGRAM(s) Oral at bedtime  enoxaparin Injectable 40 milliGRAM(s) SubCutaneous daily  lisinopril 10 milliGRAM(s) Oral daily  sotalol 80 milliGRAM(s) Oral every 12 hours      TELEMETRY: SB hr 50s, pvc  	    ECG:  	  SB hr 54, man beto   NSR jr 74, pvc, man beto Qt 480  RADIOLOGY:   DIAGNOSTIC TESTING:  [ ] Echocardiogram:  < from: Transthoracic Echocardiogram (03.05.21 @ 14:30) >  Ejection Fraction (Teicholtz): 59 %  ------------------------------------------------------------------------  OBSERVATIONS:  Mitral Valve: Bileaflet mitral valve prolapse.  Mild-moderate mitral regurgitation.  Aortic Root: Normal aortic root.  Aortic Valve: Calcified trileaflet aortic valve with normal  opening. Mild aortic regurgitation.  LeftAtrium: Mildly dilated left atrium.  LA volume index =  38 cc/m2.  Left Ventricle: Normal left ventricular systolic function.  No segmental wall motion abnormalities. Normal left  ventricular internal dimensions and wall thicknesses. Mild  diastolic dysfunction (Stage I).  Right Heart: Normal right atrium. Normal right ventricular  size and function.  A device wire is noted in the right  heart. Normal tricuspid valve. Mild tricuspid  regurgitation. Normal pulmonic valve. Minimal pulmonic  regurgitation.  Pericardium/PleuraNormal pericardium with no pericardial  effusion.  ------------------------------------------------------------------------  CONCLUSIONS:  1. Bileaflet mitral valve prolapse. Mild-moderate mitral  regurgitation.  2. Calcified trileaflet aortic valve with normal opening.  Mild aortic regurgitation.  3. Mildly dilated left atrium.  LA volume index = 38 cc/m2.  4. Normal left ventricular internal dimensions and wall  thicknesses.  5. Normal left ventricular systolic function. No segmental  wall motion abnormalities.  6. Mild diastolic dysfunction (Stage I).  7. Normal right ventricular size and function.  A device  wire is noted in the right heart.  ------------------------------------------------------------------------  Confirmed on  3/5/2021 - 16:12:22 by Donald De La Rosa M.D.,  [facility], [place]  ------------------------------------------------------------------------    < end of copied text >    [ ]  Catheterization:  [ ] Stress Test:    OTHER: 	    LABS:	 	    Troponin T, High Sensitivity Result: 16 ng/L (03-04 @ 16:29)                          14.3   9.35  )-----------( 175      ( 05 Mar 2021 05:46 )             42.2     03-05    132<L>  |  98  |  16  ----------------------------<  97  3.9   |  24  |  0.99    Ca    9.1      05 Mar 2021 05:43  Phos  3.2     03-05  Mg     2.1     03-05    TPro  7.2  /  Alb  4.7  /  TBili  0.7  /  DBili  x   /  AST  23  /  ALT  20  /  AlkPhos  43  03-04    PT/INR - ( 05 Mar 2021 05:46 )   PT: 12.8 sec;   INR: 1.12 ratio         PTT - ( 05 Mar 2021 05:46 )  PTT:29.6 sec

## 2021-03-07 LAB
ANION GAP SERPL CALC-SCNC: 9 MMOL/L — SIGNIFICANT CHANGE UP (ref 7–14)
BUN SERPL-MCNC: 17 MG/DL — SIGNIFICANT CHANGE UP (ref 7–23)
CALCIUM SERPL-MCNC: 9.1 MG/DL — SIGNIFICANT CHANGE UP (ref 8.4–10.5)
CHLORIDE SERPL-SCNC: 95 MMOL/L — LOW (ref 98–107)
CO2 SERPL-SCNC: 26 MMOL/L — SIGNIFICANT CHANGE UP (ref 22–31)
CREAT SERPL-MCNC: 0.97 MG/DL — SIGNIFICANT CHANGE UP (ref 0.5–1.3)
GLUCOSE SERPL-MCNC: 156 MG/DL — HIGH (ref 70–99)
MAGNESIUM SERPL-MCNC: 1.9 MG/DL — SIGNIFICANT CHANGE UP (ref 1.6–2.6)
PHOSPHATE SERPL-MCNC: 3.4 MG/DL — SIGNIFICANT CHANGE UP (ref 2.5–4.5)
POTASSIUM SERPL-MCNC: 3.9 MMOL/L — SIGNIFICANT CHANGE UP (ref 3.5–5.3)
POTASSIUM SERPL-SCNC: 3.9 MMOL/L — SIGNIFICANT CHANGE UP (ref 3.5–5.3)
SODIUM SERPL-SCNC: 130 MMOL/L — LOW (ref 135–145)

## 2021-03-07 RX ORDER — SOTALOL HCL 120 MG
80 TABLET ORAL EVERY 12 HOURS
Refills: 0 | Status: DISCONTINUED | OUTPATIENT
Start: 2021-03-08 | End: 2021-03-09

## 2021-03-07 RX ORDER — LISINOPRIL 2.5 MG/1
10 TABLET ORAL DAILY
Refills: 0 | Status: DISCONTINUED | OUTPATIENT
Start: 2021-03-07 | End: 2021-03-09

## 2021-03-07 RX ADMIN — Medication 80 MILLIGRAM(S): at 10:06

## 2021-03-07 RX ADMIN — LISINOPRIL 10 MILLIGRAM(S): 2.5 TABLET ORAL at 11:59

## 2021-03-07 RX ADMIN — Medication 81 MILLIGRAM(S): at 11:59

## 2021-03-07 RX ADMIN — ENOXAPARIN SODIUM 40 MILLIGRAM(S): 100 INJECTION SUBCUTANEOUS at 11:59

## 2021-03-07 RX ADMIN — ATORVASTATIN CALCIUM 10 MILLIGRAM(S): 80 TABLET, FILM COATED ORAL at 21:35

## 2021-03-07 NOTE — PROGRESS NOTE ADULT - SUBJECTIVE AND OBJECTIVE BOX
CARDIOLOGY FOLLOW UP - Dr. Kemp    CC No cp or sob       PHYSICAL EXAM:  T(C): 36.6 (03-07-21 @ 05:10), Max: 36.8 (03-06-21 @ 22:06)  HR: 54 (03-07-21 @ 05:10) (54 - 66)  BP: 150/82 (03-07-21 @ 05:10) (128/90 - 150/82)  RR: 18 (03-07-21 @ 05:10) (17 - 18)  SpO2: 98% (03-07-21 @ 05:10) (98% - 100%)  Wt(kg): --  I&O's Summary      Appearance: Normal	  Cardiovascular: Normal S1 S2,RRR, No JVD, No murmurs  Respiratory: Lungs clear to auscultation	  Gastrointestinal:  Soft, Non-tender, + BS	  Extremities: Normal range of motion, No clubbing, cyanosis or edema      Home Medications:  aspirin 81 mg oral tablet, chewable: 1 tab(s) orally once a day (04 Mar 2021 21:38)  Lipitor 10 mg oral tablet: 10 milligram(s) orally every other day (04 Mar 2021 21:38)  lisinopril 10 mg oral tablet: 1 tab(s) orally once a day (04 Mar 2021 21:38)  metoprolol tartrate 100 mg oral tablet: 100 milligram(s) orally once a day (04 Mar 2021 21:38)      MEDICATIONS  (STANDING):  aspirin  chewable 81 milliGRAM(s) Oral daily  atorvastatin 10 milliGRAM(s) Oral at bedtime  enoxaparin Injectable 40 milliGRAM(s) SubCutaneous daily  lisinopril 10 milliGRAM(s) Oral daily  sotalol 80 milliGRAM(s) Oral every 12 hours      TELEMETRY:  SB 	    ECG:    sinus bradycardia hr 58  	  RADIOLOGY:   DIAGNOSTIC TESTING:  [ ] Echocardiogram:  [ ]  Catheterization:  [ ] Stress Test:    OTHER: 	    LABS:	 	    Troponin T, High Sensitivity Result: 16 ng/L (03-04 @ 16:29)

## 2021-03-07 NOTE — PROVIDER CONTACT NOTE (OTHER) - ACTION/TREATMENT ORDERED:
Provider made aware. Morning BP medication to be rescheduled for later time. Will continue to monitor for developing s/s.

## 2021-03-07 NOTE — PROGRESS NOTE ADULT - SUBJECTIVE AND OBJECTIVE BOX
Date of Service  : 03-07-21 @ 16:19    INTERVAL HPI/OVERNIGHT EVENTS: I couldnt sleep as was worried my ICD may fire again.   Vital Signs Last 24 Hrs  T(C): 36.4 (07 Mar 2021 12:13), Max: 36.8 (06 Mar 2021 22:06)  T(F): 97.6 (07 Mar 2021 12:13), Max: 98.3 (06 Mar 2021 22:06)  HR: 61 (07 Mar 2021 12:13) (54 - 66)  BP: 127/73 (07 Mar 2021 12:13) (127/73 - 150/82)  BP(mean): --  RR: 17 (07 Mar 2021 12:13) (17 - 18)  SpO2: 99% (07 Mar 2021 12:13) (98% - 100%)  I&O's Summary    MEDICATIONS  (STANDING):  aspirin  chewable 81 milliGRAM(s) Oral daily  atorvastatin 10 milliGRAM(s) Oral at bedtime  enoxaparin Injectable 40 milliGRAM(s) SubCutaneous daily  lisinopril 10 milliGRAM(s) Oral daily  sotalol 80 milliGRAM(s) Oral every 12 hours    MEDICATIONS  (PRN):    LABS:    03-07    130<L>  |  95<L>  |  17  ----------------------------<  156<H>  3.9   |  26  |  0.97    Ca    9.1      07 Mar 2021 10:22  Phos  3.4     03-07  Mg     1.9     03-07          CAPILLARY BLOOD GLUCOSE              REVIEW OF SYSTEMS:  CONSTITUTIONAL: No fever, weight loss, or fatigue  EYES: No eye pain, visual disturbances, or discharge  ENMT:  No difficulty hearing, tinnitus, vertigo; No sinus or throat pain  NECK: No pain or stiffness  RESPIRATORY: No cough, wheezing, chills or hemoptysis; No shortness of breath  CARDIOVASCULAR: No chest pain, palpitations, dizziness, or leg swelling  GASTROINTESTINAL: No abdominal or epigastric pain. No nausea, vomiting, or hematemesis; No diarrhea or constipation. No melena or hematochezia.  GENITOURINARY: No dysuria, frequency, hematuria, or incontinence  NEUROLOGICAL: No headaches, memory loss, loss of strength, numbness, or tremors      Consultant(s) Notes Reviewed:  [x ] YES  [ ] NO    PHYSICAL EXAM:  GENERAL: NAD, well-groomed, well-developed, not in any distress ,  HEAD:  Atraumatic, Normocephalic  EYES: EOMI, PERRLA, conjunctiva and sclera clear  ENMT: No tonsillar erythema, exudates, or enlargement; Moist mucous membranes, Good dentition, No lesions  NECK: Supple, No JVD, Normal thyroid  NERVOUS SYSTEM:  Alert & Oriented X3, No focal deficit   CHEST/LUNG: Good air entry bilateral with no  rales, rhonchi, wheezing, or rubs  HEART: Regular rate and rhythm; No murmurs, rubs, or gallops  ABDOMEN: Soft, Nontender, Nondistended; Bowel sounds present  EXTREMITIES:  2+ Peripheral Pulses, No clubbing, cyanosis, or edema      Care Discussed with Consultants/Other Providers [ x] YES  [ ] NO

## 2021-03-07 NOTE — PROGRESS NOTE ADULT - SUBJECTIVE AND OBJECTIVE BOX
Patient seen and examined at bedside.    Overnight Events: NAEO,     Review Of Systems: No chest pain, shortness of breath, or palpitations            Current Meds:  aspirin  chewable 81 milliGRAM(s) Oral daily  atorvastatin 10 milliGRAM(s) Oral at bedtime  enoxaparin Injectable 40 milliGRAM(s) SubCutaneous daily  lisinopril 10 milliGRAM(s) Oral daily  sotalol 80 milliGRAM(s) Oral every 12 hours      Vitals:  T(F): 97.8 (03-07), Max: 98.3 (03-06)  HR: 54 (03-07) (54 - 66)  BP: 150/82 (03-07) (128/90 - 150/82)  RR: 18 (03-07)  SpO2: 98% (03-07)  I&O's Summary      Physical Exam:  Appearance: No acute distress; well appearing  HEENT:  EOMI, sclera anicteric, Normal oral mucosa  Cardiovascular: RRR, S1, S2, no murmurs, rubs, or gallops; no edema; no JVD  Respiratory: Clear to auscultation bilaterally, no wheezes, rales, rhonchi  Gastrointestinal: soft, non-tender, non-distended with normal bowel sounds  Musculoskeletal: No clubbing; no joint deformity   Neurologic: Non-focal  Lymphatic: No lymphadenopathy  Psychiatry: AAOx3, mood & affect appropriate  Skin: No rashes, ecchymoses, or cyanosis                 Echo:  CONCLUSIONS:  1. Bileaflet mitral valve prolapse. Mild-moderate mitral  regurgitation.  2. Calcified trileaflet aortic valve with normal opening.  Mild aortic regurgitation.  3. Mildly dilated left atrium.  LA volume index = 38 cc/m2.  4. Normal left ventricular internal dimensions and wall  thicknesses.  5. Normal left ventricular systolic function. No segmental  wall motion abnormalities.  6. Mild diastolic dysfunction (Stage I).  7. Normal right ventricular size and function.  A device  wire is noted in the right heart.    Interpretation of Telemetry: sinus 80s, PVCs Patient seen and examined at bedside.    Overnight Events: NAEO, patient continues to refuse AC.    Review Of Systems: No chest pain, shortness of breath, or palpitations            Current Meds:  aspirin  chewable 81 milliGRAM(s) Oral daily  atorvastatin 10 milliGRAM(s) Oral at bedtime  enoxaparin Injectable 40 milliGRAM(s) SubCutaneous daily  lisinopril 10 milliGRAM(s) Oral daily  sotalol 80 milliGRAM(s) Oral every 12 hours      Vitals:  T(F): 97.8 (03-07), Max: 98.3 (03-06)  HR: 54 (03-07) (54 - 66)  BP: 150/82 (03-07) (128/90 - 150/82)  RR: 18 (03-07)  SpO2: 98% (03-07)  I&O's Summary      Physical Exam:  Appearance: No acute distress; well appearing  HEENT:  EOMI, sclera anicteric, Normal oral mucosa  Cardiovascular: RRR, S1, S2, no murmurs, rubs, or gallops; no edema; no JVD  Respiratory: Clear to auscultation bilaterally, no wheezes, rales, rhonchi  Gastrointestinal: soft, non-tender, non-distended with normal bowel sounds  Musculoskeletal: No clubbing; no joint deformity   Neurologic: Non-focal  Lymphatic: No lymphadenopathy  Psychiatry: AAOx3, mood & affect appropriate  Skin: No rashes, ecchymoses, or cyanosis                 Echo:  CONCLUSIONS:  1. Bileaflet mitral valve prolapse. Mild-moderate mitral  regurgitation.  2. Calcified trileaflet aortic valve with normal opening.  Mild aortic regurgitation.  3. Mildly dilated left atrium.  LA volume index = 38 cc/m2.  4. Normal left ventricular internal dimensions and wall  thicknesses.  5. Normal left ventricular systolic function. No segmental  wall motion abnormalities.  6. Mild diastolic dysfunction (Stage I).  7. Normal right ventricular size and function.  A device  wire is noted in the right heart.    Interpretation of Telemetry: sinus 80s, PVCs

## 2021-03-07 NOTE — PROGRESS NOTE ADULT - ASSESSMENT
69 yo Male with PMH of HTN and NICM s/p single lead ICD ([employer]). Patient presented after AICD fire on 2/28 and one episode of tachycardia on 3/4 which broke after one successful ATP.   ICD interrogation revealed episodes of SVT with inappropriate therapy, SVT likely suspected to be AFib/AFlutter (CHADs-VASC score: 3). Lengthy discussion with patient regarding benefits of AC, Cardiology also reviewed importance. However, patient refusing AC at this time and states he will follow up with his cardiologist on an outpatient basis.        3/5 TTE completed today LV/RV function WNL and no valvular dysfunction noted (full report above).      - continue Sotalol 80mg BID, EKG 2 hours post dose  - CHADVASC Score= 3  - Patient refusing AC at this time, will continue Aspirin  - Monitor lytes and replete K>4.0 and Mg>2.0          69 yo Male with PMH of HTN and NICM s/p single lead ICD ([employer] [employer]). Patient presented after AICD fire on 2/28 and one episode of tachycardia on 3/4 which broke after one successful ATP.   ICD interrogation revealed episodes of SVT with inappropriate therapy, SVT likely suspected to be AFib/AFlutter (CHADs-VASC score: 3). Lengthy discussion with patient regarding benefits of AC, Cardiology also reviewed importance. However, patient refusing AC at this time and states he will follow up with his cardiologist on an outpatient basis.        3/5 TTE completed today LV/RV function WNL and no valvular dysfunction noted (full report above).      #AF/AFL  - continue Sotalol 80mg BID, QTc has remained ~460-470  - CHADVASC Score= 3  - Patient refusing AC at this time,  continue Aspirin  - Monitor lytes and replete K>4.0 and Mg>2.0    Brandon Chaves MD  Cardiology Fellow  [phone]  All Cardiology service information can be found 24/7 on [email or web address], password: [email or web address]          69 yo Male with PMH of HTN and NICM s/p single lead ICD ([employer]). Patient presented after AICD fire on 2/28 and one episode of tachycardia on 3/4 which broke after one successful ATP.   ICD interrogation revealed episodes of SVT with inappropriate therapy, SVT likely suspected to be AFib/AFlutter (CHADs-VASC score: 3). Lengthy discussion with patient regarding benefits of AC, Cardiology also reviewed importance. However, patient refusing AC at this time and states he will follow up with his cardiologist on an outpatient basis.        3/5 TTE completed today LV/RV function WNL and no valvular dysfunction noted (full report above).      #AF/AFL  - continue Sotalol 80mg BID, QTc has remained ~460-470  - CHADVASC Score= 3  - Patient refusing AC at this time,  continue Aspirin  - Monitor lytes and replete K>4.0 and Mg>2.0      Stable for d/c home from EP standpoint. Patient should f/u with Dr Maldonado.    Brandon Chaves MD  Cardiology Fellow  [phone]  All Cardiology service information can be found 24/7 on [email or web address], password: [email or web address]

## 2021-03-07 NOTE — PROGRESS NOTE ADULT - ASSESSMENT
ECHO 7/13/2013: at least mod LV dysfx   Cath 7/18/2013: no significant CAD  ECHO 3/5/21: EF 59%, Bileaflet mitral valve prolapse. Mild-moderate mitral regurgitation. Mild diastolic dysfunction (Stage I). NL LV sys fx      a/p   70yoM w/ PMHx MI, NICM s/p single lead [employer] [place] [employer] AICD, HTN presents after AICD     1. SVT / Pafib/ aflutter   -s/p ICD interrogation 3/4/21: patient with AICD shock on 2/28 and SVT with ATP; no reprogramming  -EP f/u noted : SVT likely suspected to be afib/aflutter   -A/c recommend for CHADs-VASC score: 3 : Patient now agrees, but would like to discuss with EP, Ep to follow up  -ECHO 3/5/21: EF 59%, Bileaflet mitral valve prolapse. Mild-moderate mitral regurgitation. Mild diastolic dysfunction (Stage I). NL LV sys fx    -Sotalol, per EP , ecg noted     2. Chronic Systolic CHF, NICMP s/p AICD  -volume status stable, no dyspnea   -Echo with nl LV sys fx   -c/w BB, acei  -low dose asa     dvt ppx     DCP once cleared BY EP    patient will follow up his outp cards

## 2021-03-08 LAB
ANION GAP SERPL CALC-SCNC: 16 MMOL/L — HIGH (ref 7–14)
BUN SERPL-MCNC: 17 MG/DL — SIGNIFICANT CHANGE UP (ref 7–23)
CALCIUM SERPL-MCNC: 9.4 MG/DL — SIGNIFICANT CHANGE UP (ref 8.4–10.5)
CHLORIDE SERPL-SCNC: 94 MMOL/L — LOW (ref 98–107)
CO2 SERPL-SCNC: 19 MMOL/L — LOW (ref 22–31)
CREAT SERPL-MCNC: 0.99 MG/DL — SIGNIFICANT CHANGE UP (ref 0.5–1.3)
GLUCOSE SERPL-MCNC: 95 MG/DL — SIGNIFICANT CHANGE UP (ref 70–99)
POTASSIUM SERPL-MCNC: 5.1 MMOL/L — SIGNIFICANT CHANGE UP (ref 3.5–5.3)
POTASSIUM SERPL-SCNC: 5.1 MMOL/L — SIGNIFICANT CHANGE UP (ref 3.5–5.3)
SODIUM SERPL-SCNC: 129 MMOL/L — LOW (ref 135–145)

## 2021-03-08 PROCEDURE — 99232 SBSQ HOSP IP/OBS MODERATE 35: CPT

## 2021-03-08 PROCEDURE — 93010 ELECTROCARDIOGRAM REPORT: CPT

## 2021-03-08 RX ORDER — SOTALOL HCL 120 MG
80 TABLET ORAL ONCE
Refills: 0 | Status: COMPLETED | OUTPATIENT
Start: 2021-03-08 | End: 2021-03-08

## 2021-03-08 RX ADMIN — ATORVASTATIN CALCIUM 10 MILLIGRAM(S): 80 TABLET, FILM COATED ORAL at 21:51

## 2021-03-08 RX ADMIN — Medication 81 MILLIGRAM(S): at 11:33

## 2021-03-08 RX ADMIN — LISINOPRIL 10 MILLIGRAM(S): 2.5 TABLET ORAL at 05:08

## 2021-03-08 RX ADMIN — Medication 80 MILLIGRAM(S): at 12:17

## 2021-03-08 NOTE — PROGRESS NOTE ADULT - ASSESSMENT
71 yo Male with PMH of HTN and NICM s/p single lead ICD ([employer]). Patient presented after AICD fire on 2/28 and one episode of tachycardia on 3/4 which broke after one successful ATP.   ICD interrogation revealed episodes of SVT with inappropriate therapy, SVT likely suspected to be AFib/AFlutter (CHADs-VASC score: 3). Lengthy discussion with patient regarding benefits of AC, Cardiology also reviewed importance. However, patient refusing AC at this time and states he will follow up with his cardiologist on an outpatient basis.        3/5 TTE completed today LV/RV function WNL and no valvular dysfunction noted (full report above).      #AF/AFL  - continue Sotalol 80mg BID  - CHADVASC Score= 3  - Patient refusing AC at this time,  continue Aspirin  - Monitor lytes and replete K>4.0 and Mg>2.0

## 2021-03-08 NOTE — PROGRESS NOTE ADULT - ASSESSMENT
ECHO 7/13/2013: at least mod LV dysfx   Cath 7/18/2013: no significant CAD  ECHO 3/5/21: EF 59%, Bileaflet mitral valve prolapse. Mild-moderate mitral regurgitation. Mild diastolic dysfunction (Stage I). NL LV sys fx      a/p   70yoM w/ PMHx MI, NICM s/p single lead [employer] AICD, HTN presents after AICD     1. SVT / Pafib/ aflutter   -s/p ICD interrogation 3/4/21: patient with AICD shock on 2/28 and SVT with ATP; no reprogramming  -EP f/u noted : SVT likely suspected to be afib/aflutter   -A/c recommend for CHADs-VASC score: 3 : Pt would like to f/u with outpt cardio about a/c, cont asa for now   -ECHO 3/5/21: EF 59%, Bileaflet mitral valve prolapse. Mild-moderate mitral regurgitation. Mild diastolic dysfunction (Stage I). NL LV sys fx    -Sotalol, per EP , ecg noted     2. Chronic Systolic CHF, NICMP s/p AICD  -volume status stable, no dyspnea   -Echo with nl LV sys fx   -c/w BB, acei  -low dose asa     dvt ppx     DCP once cleared BY EP    patient will follow up his outp cards    plan discussed with ACP    ECHO 7/13/2013: at least mod LV dysfx   Cath 7/18/2013: no significant CAD  ECHO 3/5/21: EF 59%, Bileaflet mitral valve prolapse. Mild-moderate mitral regurgitation. Mild diastolic dysfunction (Stage I). NL LV sys fx      a/p   70yoM w/ PMHx MI, NICM s/p single lead [employer] AICD, HTN presents after AICD     1. SVT / Pafib/ aflutter   -s/p ICD interrogation 3/4/21: patient with AICD shock on 2/28 and SVT with ATP; no reprogramming  -EP f/u noted : SVT likely suspected to be afib/aflutter   -A/c recommend for CHADs-VASC score: 3 : Pt would like to f/u with outpt cardio about a/c, cont asa for now   -ECHO 3/5/21: EF 59%, Bileaflet mitral valve prolapse. Mild-moderate mitral regurgitation. Mild diastolic dysfunction (Stage I). NL LV sys fx    -Sotalol, per EP , ecg noted     2. Chronic Systolic CHF, NICMP s/p AICD  -volume status stable, no dyspnea   -Echo with nl LV sys fx   -c/w BB, acei  -low dose asa     3. Hyponatremia  -Na noted  -renal eval     dvt ppx     DCP once cleared BY EP and renal  patient will follow up his outp cards    plan discussed with ACP

## 2021-03-08 NOTE — PROGRESS NOTE ADULT - SUBJECTIVE AND OBJECTIVE BOX
CARDIOLOGY FOLLOW UP - Dr. Kemp    CC: denies cp, sob, and palpitations       PHYSICAL EXAM:  T(C): 36.6 (03-08-21 @ 05:04), Max: 36.8 (03-07-21 @ 21:35)  HR: 61 (03-08-21 @ 05:04) (58 - 61)  BP: 133/88 (03-08-21 @ 05:04) (127/73 - 148/82)  RR: 20 (03-08-21 @ 05:04) (17 - 20)  SpO2: 99% (03-08-21 @ 05:04) (99% - 99%)  Wt(kg): --  I&O's Summary      Appearance: Normal	  Cardiovascular: Normal S1 S2,RRR, No JVD, No murmurs  Respiratory: Lungs clear to auscultation	  Gastrointestinal:  Soft, Non-tender, + BS	  Extremities: Normal range of motion, No clubbing, cyanosis or edema      Home Medications:  aspirin 81 mg oral tablet, chewable: 1 tab(s) orally once a day (04 Mar 2021 21:38)  Lipitor 10 mg oral tablet: 10 milligram(s) orally every other day (04 Mar 2021 21:38)  lisinopril 10 mg oral tablet: 1 tab(s) orally once a day (04 Mar 2021 21:38)  metoprolol tartrate 100 mg oral tablet: 100 milligram(s) orally once a day (04 Mar 2021 21:38)      MEDICATIONS  (STANDING):  aspirin  chewable 81 milliGRAM(s) Oral daily  atorvastatin 10 milliGRAM(s) Oral at bedtime  enoxaparin Injectable 40 milliGRAM(s) SubCutaneous daily  lisinopril 10 milliGRAM(s) Oral daily  sotalol 80 milliGRAM(s) Oral every 12 hours      TELEMETRY: NSR, 4 beats WCT    ECG:  	  RADIOLOGY:   DIAGNOSTIC TESTING:  [ ] Echocardiogram:  [ ]  Catheterization:  [ ] Stress Test:    OTHER: 	    LABS:	 	    Troponin T, High Sensitivity Result: 16 ng/L (03-04 @ 16:29)      03-08    129<L>  |  94<L>  |  17  ----------------------------<  95  5.1   |  19<L>  |  0.99    Ca    9.4      08 Mar 2021 07:16  Phos  3.4     03-07  Mg     1.9     03-07

## 2021-03-08 NOTE — PROVIDER CONTACT NOTE (OTHER) - ACTION/TREATMENT ORDERED:
Hold AM Sotalol given inconsistencies with EKGs. Await EP team c/s in morning to determine Sotalol adjustment.

## 2021-03-08 NOTE — PROGRESS NOTE ADULT - SUBJECTIVE AND OBJECTIVE BOX
Interval History:  Patient states he is feeling well; denies any chest pain, palpitations, lightheadedness or dizziness     MEDICATIONS  (STANDING):  aspirin  chewable 81 milliGRAM(s) Oral daily  atorvastatin 10 milliGRAM(s) Oral at bedtime  enoxaparin Injectable 40 milliGRAM(s) SubCutaneous daily  lisinopril 10 milliGRAM(s) Oral daily  sotalol 80 milliGRAM(s) Oral every 12 hours    MEDICATIONS  (PRN):    Vital Signs Last 24 Hrs  T(C): 36.6 (08 Mar 2021 05:04), Max: 36.8 (07 Mar 2021 21:35)  T(F): 97.8 (08 Mar 2021 05:04), Max: 98.3 (07 Mar 2021 21:35)  HR: 61 (08 Mar 2021 05:04) (58 - 61)  BP: 133/88 (08 Mar 2021 05:04) (127/73 - 148/82)  BP(mean): --  RR: 20 (08 Mar 2021 05:04) (17 - 20)  SpO2: 99% (08 Mar 2021 05:04) (99% - 99%)    Appearance: Normal	  HEENT:   Normal oral mucosa, PERRL, EOMI	  Lymphatic: No lymphadenopathy  Cardiovascular: Normal S1 S2, No JVD, No murmurs, No edema  Respiratory: Lungs clear to auscultation	  Psychiatry: A & O x 3, Mood & affect appropriate  Gastrointestinal:  Soft, Non-tender, + BS	  Skin: No rashes, No ecchymoses, No cyanosis	  Neurologic: Non-focal  Extremities: Normal range of motion, No clubbing, cyanosis or edema  Vascular: Peripheral pulses palpable 2+ bilaterally    LABS:	 	      03-08    129<L>  |  94<L>  |  17  ----------------------------<  95  5.1   |  19<L>  |  0.99  03-07    130<L>  |  95<L>  |  17  ----------------------------<  156<H>  3.9   |  26  |  0.97    Ca    9.4      08 Mar 2021 07:16  Ca    9.1      07 Mar 2021 10:22  Phos  3.4     03-07  Mg     1.9     03-07

## 2021-03-08 NOTE — PROVIDER CONTACT NOTE (OTHER) - RECOMMENDATIONS
Contact provider, hold morning BP medication and continue to monitor for developing s/s.
ACP notified.

## 2021-03-08 NOTE — PROGRESS NOTE ADULT - SUBJECTIVE AND OBJECTIVE BOX
Date of Service  : 03-08-21 @ 17:20    INTERVAL HPI/OVERNIGHT EVENTS: No new concerns.   Vital Signs Last 24 Hrs  T(C): 36.3 (08 Mar 2021 14:50), Max: 36.8 (07 Mar 2021 21:35)  T(F): 97.4 (08 Mar 2021 14:50), Max: 98.3 (07 Mar 2021 21:35)  HR: 56 (08 Mar 2021 14:50) (56 - 61)  BP: 128/81 (08 Mar 2021 14:50) (128/81 - 148/82)  BP(mean): --  RR: 18 (08 Mar 2021 14:50) (18 - 20)  SpO2: 99% (08 Mar 2021 14:50) (99% - 99%)  I&O's Summary    MEDICATIONS  (STANDING):  aspirin  chewable 81 milliGRAM(s) Oral daily  atorvastatin 10 milliGRAM(s) Oral at bedtime  enoxaparin Injectable 40 milliGRAM(s) SubCutaneous daily  lisinopril 10 milliGRAM(s) Oral daily  sotalol 80 milliGRAM(s) Oral every 12 hours    MEDICATIONS  (PRN):    LABS:    03-08    129<L>  |  94<L>  |  17  ----------------------------<  95  5.1   |  19<L>  |  0.99    Ca    9.4      08 Mar 2021 07:16  Phos  3.4     03-07  Mg     1.9     03-07          CAPILLARY BLOOD GLUCOSE              REVIEW OF SYSTEMS:  CONSTITUTIONAL: No fever, weight loss, or fatigue  EYES: No eye pain, visual disturbances, or discharge  ENMT:  No difficulty hearing, tinnitus, vertigo; No sinus or throat pain  NECK: No pain or stiffness  RESPIRATORY: No cough, wheezing, chills or hemoptysis; No shortness of breath  CARDIOVASCULAR: No chest pain, palpitations, dizziness, or leg swelling  GASTROINTESTINAL: No abdominal or epigastric pain. No nausea, vomiting, or hematemesis; No diarrhea or constipation. No melena or hematochezia.  GENITOURINARY: No dysuria, frequency, hematuria, or incontinence  NEUROLOGICAL: No headaches, memory loss, loss of strength, numbness, or tremors  SKIN: No itching, burning, rashes, or lesions   ENDOCRINE: No heat or cold intolerance; No hair loss  MUSCULOSKELETAL: No joint pain or swelling; No muscle, back, or extremity pain  PSYCHIATRIC: No depression, anxiety, mood swings, or difficulty sleeping  HEME/LYMPH: No easy bruising, or bleeding gums  ALLERY AND IMMUNOLOGIC: No hives or eczema    RADIOLOGY & ADDITIONAL TESTS:    Consultant(s) Notes Reviewed:  [x ] YES  [ ] NO    PHYSICAL EXAM:  GENERAL: NAD, well-groomed, well-developed,not in any distress ,  HEAD:  Atraumatic, Normocephalic  EYES: EOMI, PERRLA, conjunctiva and sclera clear  ENMT: No tonsillar erythema, exudates, or enlargement; Moist mucous membranes, Good dentition, No lesions  NECK: Supple, No JVD, Normal thyroid  NERVOUS SYSTEM:  Alert & Oriented X3, No focal deficit   CHEST/LUNG: Good air entry bilateral with no  rales, rhonchi, wheezing, or rubs  HEART: Regular rate and rhythm; No murmurs, rubs, or gallops  ABDOMEN: Soft, Nontender, Nondistended; Bowel sounds present  EXTREMITIES:  2+ Peripheral Pulses, No clubbing, cyanosis, or edema  SKIN: No rashes or lesions    Care Discussed with Consultants/Other Providers [ x] YES  [ ] NO

## 2021-03-08 NOTE — PROGRESS NOTE ADULT - ASSESSMENT
70yoM w/ PMHx NICM s/p single lead [employer] AICD, HTN presents after AICD fire found to have episode of SVT w/ inappropriate therapy admitted for further eval      Problem/Plan - 1:  ·  Problem: SVT (supraventricular tachycardia).  Plan: -Pt w/ episodes of SVT w/ shock and ATP  -SVT suspected to be afib/aflutter  -appreciate EP help. Started on Sotalol   -Refusing AC and said will D/W EP .   -continue with metoprolol  -  < from: Transthoracic Echocardiogram (03.05.21 @ 14:30) >  CONCLUSIONS:  1. Bileaflet mitral valve prolapse. Mild-moderate mitral  regurgitation.  2. Calcified trileaflet aortic valve with normal opening.  Mild aortic regurgitation.  3. Mildly dilated left atrium.  LA volume index = 38 cc/m2.  4. Normal left ventricular internal dimensions and wall  thicknesses.  5. Normal left ventricular systolic function. No segmental  wall motion abnormalities.  6. Mild diastolic dysfunction (Stage I).  7. Normal right ventricular size and function.  A device  wire is noted in the right heart.  -----------------------------------------    < end of copied text >     Problem/Plan - 2:  ·  Problem: Nonischemic cardiomyopathy with Chronic Systolic CHSF .  Plan: -continue with lisinopril, metoprolol, statin  -F/u TTE.   - Cards helping .    Problem/Plan - 3:  ·  Problem: Hyponatremia with Hyperkalemia   Plan: Said I have been drinking water all day plus medication induced. Water restriction and rpt AM . d/W Cardiology PA so will consult Renal .      Problem/Plan - 4:  ·  Problem: Need for prophylactic measure.  Plan: DVT ppx: lovenox.

## 2021-03-09 ENCOUNTER — TRANSCRIPTION ENCOUNTER (OUTPATIENT)
Age: 71
End: 2021-03-09

## 2021-03-09 VITALS
DIASTOLIC BLOOD PRESSURE: 85 MMHG | RESPIRATION RATE: 16 BRPM | HEART RATE: 60 BPM | OXYGEN SATURATION: 99 % | SYSTOLIC BLOOD PRESSURE: 145 MMHG | TEMPERATURE: 98 F

## 2021-03-09 LAB
ANION GAP SERPL CALC-SCNC: 12 MMOL/L — SIGNIFICANT CHANGE UP (ref 7–14)
BUN SERPL-MCNC: 21 MG/DL — SIGNIFICANT CHANGE UP (ref 7–23)
CALCIUM SERPL-MCNC: 9.5 MG/DL — SIGNIFICANT CHANGE UP (ref 8.4–10.5)
CHLORIDE SERPL-SCNC: 97 MMOL/L — LOW (ref 98–107)
CO2 SERPL-SCNC: 25 MMOL/L — SIGNIFICANT CHANGE UP (ref 22–31)
CREAT SERPL-MCNC: 1.1 MG/DL — SIGNIFICANT CHANGE UP (ref 0.5–1.3)
GLUCOSE SERPL-MCNC: 100 MG/DL — HIGH (ref 70–99)
HCT VFR BLD CALC: 45.4 % — SIGNIFICANT CHANGE UP (ref 39–50)
HGB BLD-MCNC: 15.5 G/DL — SIGNIFICANT CHANGE UP (ref 13–17)
MAGNESIUM SERPL-MCNC: 2.2 MG/DL — SIGNIFICANT CHANGE UP (ref 1.6–2.6)
MCHC RBC-ENTMCNC: 30.4 PG — SIGNIFICANT CHANGE UP (ref 27–34)
MCHC RBC-ENTMCNC: 34.1 GM/DL — SIGNIFICANT CHANGE UP (ref 32–36)
MCV RBC AUTO: 89 FL — SIGNIFICANT CHANGE UP (ref 80–100)
NRBC # BLD: 0 /100 WBCS — SIGNIFICANT CHANGE UP
NRBC # FLD: 0 K/UL — SIGNIFICANT CHANGE UP
PHOSPHATE SERPL-MCNC: 3.6 MG/DL — SIGNIFICANT CHANGE UP (ref 2.5–4.5)
PLATELET # BLD AUTO: 201 K/UL — SIGNIFICANT CHANGE UP (ref 150–400)
POTASSIUM SERPL-MCNC: 4 MMOL/L — SIGNIFICANT CHANGE UP (ref 3.5–5.3)
POTASSIUM SERPL-SCNC: 4 MMOL/L — SIGNIFICANT CHANGE UP (ref 3.5–5.3)
RBC # BLD: 5.1 M/UL — SIGNIFICANT CHANGE UP (ref 4.2–5.8)
RBC # FLD: 12.4 % — SIGNIFICANT CHANGE UP (ref 10.3–14.5)
SODIUM SERPL-SCNC: 134 MMOL/L — LOW (ref 135–145)
WBC # BLD: 8.9 K/UL — SIGNIFICANT CHANGE UP (ref 3.8–10.5)
WBC # FLD AUTO: 8.9 K/UL — SIGNIFICANT CHANGE UP (ref 3.8–10.5)

## 2021-03-09 PROCEDURE — 99232 SBSQ HOSP IP/OBS MODERATE 35: CPT

## 2021-03-09 RX ORDER — SOTALOL HCL 120 MG
1 TABLET ORAL
Qty: 60 | Refills: 0
Start: 2021-03-09 | End: 2021-04-07

## 2021-03-09 RX ORDER — METOPROLOL TARTRATE 50 MG
100 TABLET ORAL
Qty: 0 | Refills: 0 | DISCHARGE

## 2021-03-09 RX ADMIN — ENOXAPARIN SODIUM 40 MILLIGRAM(S): 100 INJECTION SUBCUTANEOUS at 12:03

## 2021-03-09 RX ADMIN — Medication 80 MILLIGRAM(S): at 00:00

## 2021-03-09 RX ADMIN — Medication 81 MILLIGRAM(S): at 12:04

## 2021-03-09 RX ADMIN — LISINOPRIL 10 MILLIGRAM(S): 2.5 TABLET ORAL at 05:14

## 2021-03-09 RX ADMIN — Medication 80 MILLIGRAM(S): at 12:04

## 2021-03-09 NOTE — PROVIDER CONTACT NOTE (OTHER) - SITUATION
Patient sinus bradycardia down to 40s briefly on telemetry monitoring. Patient back to sinus bradycardia 50s to NSR 60s on telemetry monitoring.
Patient had 4 beats of v-tach on tele monitor
Patient just had 13 beats of PVC. Patient back to sinus bradycardia 50s to NSR 60s on telemetry monitoring.
Pt HR 54
RN performed EKG prior to AM Sotalol dose. 4 EKG strips printed were inconsistent (see chart for strips).

## 2021-03-09 NOTE — CONSULT NOTE ADULT - ATTENDING COMMENTS
[facility] NEPHROLOGY  Maciel Burk M.D.  Eric Carmichael D.O.  Roxanne Cortes M.D.  Kylee Henderson, MSN, ANP-C    Telephone: [phone]  Facsimile: [phone]    [address] [place] [address]
70yoM w/ PMHx NICM s/p single lead [employer] AICD, HTN presents with s/p AICD fire presents after AICD fire on 2/28 and one episode of tachycardia today 3/4, broke after one successful ATP.  Patient states on 2/28, he woke up feeling short of breath and weak, when he stood up was when he felt the shock and has since felt very weak.  Remote monitoring alerted outpatient cardiology office today and patient was advised to go the ER.  Denies any changes in activity level, chest pain, lifestyle, medication, palpitations, lightheadedness, dizziness, LOC, fever, chills.  States his wife passed away about 1 year ago (not COVID related) and in recent weeks has seen a decrease in his appetite. ICD shocks inappropriate in the setting for SVT- likely AT/AFib. Plan for AC. Cont metop- plan for TTE and AAD- likely sotalol.
Patient seen and examined, agree with the above assessment and plan by ASHOK Curiel.  70yoM w/ PMHx MI, NICM s/p single lead [employer] [place] [employer] AICD, HTN presents after AICD     1. SVT / Pafib/ aflutter   -s/p ICD interrogation 3/4/21: patient with AICD shock on 2/28 and SVT with ATP; no reprogramming  -EP f/u noted : SVT likely suspected to be afib/aflutter   -start anticoagulation for CHADs-VASC score: 3   - Pending TTE to evaluate valves and LV/RV function  -Continue Toprol 100mg PO daily     2. Chronic Systolic CHF, NICMP s/p AICD  -doesnt  appear overloaded on exam   -c/w BB, ace  -low dose asa     dvt ppx

## 2021-03-09 NOTE — PROVIDER CONTACT NOTE (OTHER) - ASSESSMENT
Patient resting in bed no s/s of distress. Zoll and atropine at bedside as per orders.
Patient resting in bed with no c/o distress.
Patient resting comfortably in bed, asymptomatic at this time.
Patient sinus bradycardia down to 40s briefly on telemetry monitoring. Patient back to sinus bradycardia 50s to NSR 60s on telemetry monitoring. Patient resting in bed with no s/s of distress. Patient denies symptoms.
Pt is alert and oriented. Other vital signs stable. Pt denies headache, dizziness, chest pain and shortness of breath. Pt denies any other s/s. Pt appears to be resting comfortably in bed.

## 2021-03-09 NOTE — PROGRESS NOTE ADULT - PROVIDER SPECIALTY LIST ADULT
Cardiology
Internal Medicine
Internal Medicine
Electrophysiology
Electrophysiology
Cardiology
Cardiology
Electrophysiology
Electrophysiology
Cardiology
Electrophysiology
Internal Medicine

## 2021-03-09 NOTE — PROGRESS NOTE ADULT - SUBJECTIVE AND OBJECTIVE BOX
Date of Service  : 03-09-21 @ 12:44    INTERVAL HPI/OVERNIGHT EVENTS: I want to go home.   Vital Signs Last 24 Hrs  T(C): 36.7 (09 Mar 2021 10:01), Max: 37.1 (08 Mar 2021 21:49)  T(F): 98 (09 Mar 2021 10:01), Max: 98.8 (08 Mar 2021 21:49)  HR: 60 (09 Mar 2021 10:01) (56 - 65)  BP: 145/85 (09 Mar 2021 10:01) (128/81 - 147/83)  BP(mean): --  RR: 16 (09 Mar 2021 10:01) (16 - 20)  SpO2: 99% (09 Mar 2021 10:01) (97% - 100%)  I&O's Summary    MEDICATIONS  (STANDING):  aspirin  chewable 81 milliGRAM(s) Oral daily  atorvastatin 10 milliGRAM(s) Oral at bedtime  enoxaparin Injectable 40 milliGRAM(s) SubCutaneous daily  lisinopril 10 milliGRAM(s) Oral daily  sotalol 80 milliGRAM(s) Oral every 12 hours    MEDICATIONS  (PRN):    LABS:                        15.5   8.90  )-----------( 201      ( 09 Mar 2021 02:56 )             45.4     03-09    134<L>  |  97<L>  |  21  ----------------------------<  100<H>  4.0   |  25  |  1.10    Ca    9.5      09 Mar 2021 02:56  Phos  3.6     03-09  Mg     2.2     03-09          CAPILLARY BLOOD GLUCOSE              REVIEW OF SYSTEMS:  CONSTITUTIONAL: No fever, weight loss, or fatigue  EYES: No eye pain, visual disturbances, or discharge  ENMT:  No difficulty hearing, tinnitus, vertigo; No sinus or throat pain  NECK: No pain or stiffness  RESPIRATORY: No cough, wheezing, chills or hemoptysis; No shortness of breath  CARDIOVASCULAR: No chest pain, palpitations, dizziness, or leg swelling  GASTROINTESTINAL: No abdominal or epigastric pain. No nausea, vomiting, or hematemesis; No diarrhea or constipation. No melena or hematochezia.  GENITOURINARY: No dysuria, frequency, hematuria, or incontinence  NEUROLOGICAL: No headaches, memory loss, loss of strength, numbness, or tremors      Consultant(s) Notes Reviewed:  [x ] YES  [ ] NO    PHYSICAL EXAM:  GENERAL: NAD, well-groomed, well-developed,not in any distress ,  HEAD:  Atraumatic, Normocephalic  EYES: EOMI, PERRLA, conjunctiva and sclera clear  ENMT: No tonsillar erythema, exudates, or enlargement; Moist mucous membranes, Good dentition, No lesions  NECK: Supple, No JVD, Normal thyroid  NERVOUS SYSTEM:  Alert & Oriented X3, No focal deficit   CHEST/LUNG: Good air entry bilateral with no  rales, rhonchi, wheezing, or rubs  HEART: Regular rate and rhythm; No murmurs, rubs, or gallops  ABDOMEN: Soft, Nontender, Nondistended; Bowel sounds present  EXTREMITIES:  2+ Peripheral Pulses, No clubbing, cyanosis, or edema  SKIN: No rashes or lesions    Care Discussed with Consultants/Other Providers [ x] YES  [ ] NO

## 2021-03-09 NOTE — CONSULT NOTE ADULT - ASSESSMENT
70y Male with history of NICM s/p AICD, HTN presents with AICD shock. Nephrology consulted for hyponatremia.    1) Hyponatremia: likely due to excessive fluid intake. Serum sodium improved this morning after patient decreased fluid intake overnight. Defer further hyponatremia work up given improving serum Na with plans to discharge home today. Patient advised to decrease fluid intake and drink to thirst. Monitor serum Na.    2) HTN: BP controlled. Continue with current medications. Monitor BP.    3) SVT: On sotalol as per EP/Cards.    4) FTT: Start ensure with meals. 70y Male with history of NICM s/p AICD, HTN presents with AICD shock. Nephrology consulted for hyponatremia.    1) Hyponatremia: likely due to excessive fluid intake. Serum sodium improved this morning after patient decreased fluid intake overnight. Defer further hyponatremia work up given improving serum Na with plans to discharge home today. Patient advised to decrease fluid intake and drink to thirst. Monitor serum Na.    2) HTN: BP controlled. Continue with current medications. Monitor BP.    3) SVT: On sotalol as per EP/Cards.    4) FTT: Start ensure with meals.    No renal objection to discharge today. Patient advised to have repeat BMP with PMD within 1-2 weeks.

## 2021-03-09 NOTE — PROGRESS NOTE ADULT - ASSESSMENT
ECHO 7/13/2013: at least mod LV dysfx   Cath 7/18/2013: no significant CAD  ECHO 3/5/21: EF 59%, Bileaflet mitral valve prolapse. Mild-moderate mitral regurgitation. Mild diastolic dysfunction (Stage I). NL LV sys fx      a/p   70yoM w/ PMHx MI, NICM s/p single lead [employer] AICD, HTN presents after AICD     1. SVT / Pafib/ aflutter   -s/p ICD interrogation 3/4/21: patient with AICD shock on 2/28 and SVT with ATP; no reprogramming  -EP f/u noted : SVT likely suspected to be afib/aflutter   -A/c recommend for CHADs-VASC score: 3 : Pt would like to f/u with outpt cardio about a/c, cont asa for now   -ECHO 3/5/21: EF 59%, Bileaflet mitral valve prolapse. Mild-moderate mitral regurgitation. Mild diastolic dysfunction (Stage I). NL LV sys fx    -Sotalol BID per EP, ecg noted     2. Chronic Systolic CHF, NICMP s/p AICD  -volume status stable, no dyspnea   -Echo with nl LV sys fx   -c/w BB, acei  -low dose asa     3. Hyponatremia  -Na improving   -renal eval noted    dvt ppx     DCP once cleared BY EP   patient will follow up his outp cards Dr. Aguilar ([facility])     plan discussed with ACP

## 2021-03-09 NOTE — PROGRESS NOTE ADULT - SUBJECTIVE AND OBJECTIVE BOX
CARDIOLOGY FOLLOW UP - Dr. Kemp    CC: denies cp, sob, and palpitations       PHYSICAL EXAM:  T(C): 36.7 (03-09-21 @ 10:01), Max: 37.1 (03-08-21 @ 21:49)  HR: 60 (03-09-21 @ 10:01) (56 - 65)  BP: 145/85 (03-09-21 @ 10:01) (128/81 - 147/83)  RR: 16 (03-09-21 @ 10:01) (16 - 20)  SpO2: 99% (03-09-21 @ 10:01) (97% - 100%)  Wt(kg): --  I&O's Summary      Appearance: Normal	  Cardiovascular: Normal S1 S2,RRR, No JVD, No murmurs  Respiratory: Lungs clear to auscultation	  Gastrointestinal:  Soft, Non-tender, + BS	  Extremities: Normal range of motion, No clubbing, cyanosis or edema      Home Medications:  aspirin 81 mg oral tablet, chewable: 1 tab(s) orally once a day (04 Mar 2021 21:38)  Lipitor 10 mg oral tablet: 10 milligram(s) orally every other day (04 Mar 2021 21:38)  lisinopril 10 mg oral tablet: 1 tab(s) orally once a day (04 Mar 2021 21:38)  metoprolol tartrate 100 mg oral tablet: 100 milligram(s) orally once a day (04 Mar 2021 21:38)      MEDICATIONS  (STANDING):  aspirin  chewable 81 milliGRAM(s) Oral daily  atorvastatin 10 milliGRAM(s) Oral at bedtime  enoxaparin Injectable 40 milliGRAM(s) SubCutaneous daily  lisinopril 10 milliGRAM(s) Oral daily  sotalol 80 milliGRAM(s) Oral every 12 hours      TELEMETRY: SR PVCs 70s, brief nga to 46 over night  	    ECG:  	  RADIOLOGY:   DIAGNOSTIC TESTING:  [ ] Echocardiogram:  [ ]  Catheterization:  [ ] Stress Test:    OTHER: 	    LABS:	 	    Troponin T, High Sensitivity Result: 16 ng/L (03-04 @ 16:29)                          15.5   8.90  )-----------( 201      ( 09 Mar 2021 02:56 )             45.4     03-09    134<L>  |  97<L>  |  21  ----------------------------<  100<H>  4.0   |  25  |  1.10    Ca    9.5      09 Mar 2021 02:56  Phos  3.6     03-09  Mg     2.2     03-09

## 2021-03-09 NOTE — PROGRESS NOTE ADULT - REASON FOR ADMISSION
ICD shock

## 2021-03-09 NOTE — PROVIDER CONTACT NOTE (OTHER) - BACKGROUND
70 year old admitted due to AICD fired at home.
70 year old male admitted for AICD fired at home.
70 year old male admitted due to AICD fired at home.
Patient admitted for AICD firing and supraventricular tachycardia reading
Pt is a 70 yr old male w/ pmhx of nonischemic cardiomyopathy p/w AICD fire

## 2021-03-09 NOTE — PROGRESS NOTE ADULT - SUBJECTIVE AND OBJECTIVE BOX
Interval History:  No significant events overnight     MEDICATIONS  (STANDING):  aspirin  chewable 81 milliGRAM(s) Oral daily  atorvastatin 10 milliGRAM(s) Oral at bedtime  enoxaparin Injectable 40 milliGRAM(s) SubCutaneous daily  lisinopril 10 milliGRAM(s) Oral daily  sotalol 80 milliGRAM(s) Oral every 12 hours    MEDICATIONS  (PRN):    Vital Signs Last 24 Hrs  T(C): 36.7 (09 Mar 2021 10:01), Max: 37.1 (08 Mar 2021 21:49)  T(F): 98 (09 Mar 2021 10:01), Max: 98.8 (08 Mar 2021 21:49)  HR: 60 (09 Mar 2021 10:01) (56 - 65)  BP: 145/85 (09 Mar 2021 10:01) (128/81 - 147/83)  BP(mean): --  RR: 16 (09 Mar 2021 10:01) (16 - 20)  SpO2: 99% (09 Mar 2021 10:01) (97% - 100%)    Appearance: Normal	  HEENT:   Normal oral mucosa, PERRL, EOMI	  Lymphatic: No lymphadenopathy  Cardiovascular: Normal S1 S2, No JVD, No murmurs, No edema  Respiratory: Lungs clear to auscultation	  Psychiatry: A & O x 3, Mood & affect appropriate  Gastrointestinal:  Soft, Non-tender, + BS	  Skin: No rashes, No ecchymoses, No cyanosis	  Neurologic: Non-focal  Extremities: Normal range of motion, No clubbing, cyanosis or edema  Vascular: Peripheral pulses palpable 2+ bilaterally    LABS:	 	    CBC Full  -  ( 09 Mar 2021 02:56 )  WBC Count : 8.90 K/uL  Hemoglobin : 15.5 g/dL  Hematocrit : 45.4 %  Platelet Count - Automated : 201 K/uL  Mean Cell Volume : 89.0 fL  Mean Cell Hemoglobin : 30.4 pg  Mean Cell Hemoglobin Concentration : 34.1 gm/dL  Auto Neutrophil # : x  Auto Lymphocyte # : x  Auto Monocyte # : x  Auto Eosinophil # : x  Auto Basophil # : x  Auto Neutrophil % : x  Auto Lymphocyte % : x  Auto Monocyte % : x  Auto Eosinophil % : x  Auto Basophil % : x    03-09    134<L>  |  97<L>  |  21  ----------------------------<  100<H>  4.0   |  25  |  1.10  03-08    129<L>  |  94<L>  |  17  ----------------------------<  95  5.1   |  19<L>  |  0.99    Ca    9.5      09 Mar 2021 02:56  Ca    9.4      08 Mar 2021 07:16  Phos  3.6     03-09  Mg     2.2     03-09

## 2021-03-09 NOTE — PROVIDER CONTACT NOTE (OTHER) - REASON
Pt HR 54
Patient had 4 beats of v-tach on tele monitor
Patient EKG inconsistent
Patient just had 13 beats of PVCs
Patient sinus bradycardia down to 40s briefly on telemetry monitoring

## 2021-03-09 NOTE — CONSULT NOTE ADULT - SUBJECTIVE AND OBJECTIVE BOX
[facility] NEPHROLOGY- CONSULTATION NOTE    70y Male with history of below presents with AICD shock. Nephrology consulted for hyponatremia.    Patient denies any prior history of hyponatremia. Patient not on any diuretics, anti-depressants or antiepileptic medications as an outpatient. Patient denies any nausea, vomiting, diarrhea or decreased PO intake. Patient admits to excessive fluid intake.    REVIEW OF SYSTEMS:  Gen: no changes in weight  HEENT: no rhinorrhea  Neck: no sore throat  Cards: no chest pain  Resp: no dyspnea  GI: no nausea or vomiting or diarrhea  : no dysuria or hematuria  Vascular: no LE edema  Derm: no rashes  Neuro: no numbness/tingling    No Known Allergies      Home Medications Reviewed  Hospital Medications:   MEDICATIONS  (STANDING):  aspirin  chewable 81 milliGRAM(s) Oral daily  atorvastatin 10 milliGRAM(s) Oral at bedtime  enoxaparin Injectable 40 milliGRAM(s) SubCutaneous daily  lisinopril 10 milliGRAM(s) Oral daily  sotalol 80 milliGRAM(s) Oral every 12 hours      PAST MEDICAL & SURGICAL HISTORY:  Nonischemic cardiomyopathy    S/P implantation of automatic cardioverter/defibrillator (AICD)        FAMILY HISTORY:  No pertinent family history in first degree relatives        SOCIAL HISTORY:  Denies toxic substance use     VITALS:  T(F): 97.5 (03-09-21 @ 05:12), Max: 98.8 (03-08-21 @ 21:49)  HR: 57 (03-09-21 @ 05:12)  BP: 131/83 (03-09-21 @ 05:12)  RR: 18 (03-09-21 @ 05:12)  SpO2: 100% (03-09-21 @ 05:12)  Wt(kg): --        PHYSICAL EXAM:  Gen: NAD, calm  HEENT: MMM  Neck: no JVD  Cards: RRR, +S1/S2, no M/G/R  Resp: CTA B/L  GI: soft, NT/ND, NABS  : no CVA tenderness  Vascular: no LE edema B/L  Derm: no rashes  Neuro: non-focal    LABS:  03-09    134<L>  |  97<L>  |  21  ----------------------------<  100<H>  4.0   |  25  |  1.10    Ca    9.5      09 Mar 2021 02:56  Phos  3.6     03-09  Mg     2.2     03-09      Creatinine Trend: 1.10 <--, 0.99 <--, 0.97 <--, 0.99 <--, 1.02 <--                        15.5   8.90  )-----------( 201      ( 09 Mar 2021 02:56 )             45.4     Urine Studies:        RADIOLOGY & ADDITIONAL STUDIES:    < from: Xray Chest 2 Views PA/Lat (03.04.21 @ 17:28) >  IMPRESSION:  Clear lungs.    < end of copied text >

## 2021-03-09 NOTE — PROGRESS NOTE ADULT - ASSESSMENT
69 yo Male with PMH of HTN and NICM s/p single lead ICD ([employer]). Patient presented after AICD fire on 2/28 and one episode of tachycardia on 3/4 which broke after one successful ATP.   ICD interrogation revealed episodes of SVT with inappropriate therapy, SVT likely suspected to be AFib/AFlutter (CHADs-VASC score: 3). Lengthy discussion with patient regarding benefits of AC, Cardiology also reviewed importance. However, patient refusing AC at this time and states he will follow up with his cardiologist on an outpatient basis.        3/5 TTE completed today LV/RV function WNL and no valvular dysfunction noted (full report above).      #AF/AFL  - continue Sotalol 80mg BID  - CHADVASC Score= 3  - Patient refusing AC at this time,  continue Aspirin  - Monitor lytes and replete K>4.0 and Mg>2.0  - Follow up with Mert Maldonado MD on 4/15 at 0745AM after discharge   - Patient to follow up with outpatient Cards Dr Aguilar after discharge           69 yo Male with PMH of HTN and NICM s/p single lead ICD ([employer]). Patient presented after AICD fire on 2/28 and one episode of tachycardia on 3/4 which broke after one successful ATP.   ICD interrogation revealed episodes of SVT with inappropriate therapy, SVT likely suspected to be AFib/AFlutter (CHADs-VASC score: 3). Lengthy discussion with patient regarding benefits of AC, Cardiology also reviewed importance. However, patient refusing AC at this time and states he will follow up with his cardiologist on an outpatient basis.        3/5 TTE completed today LV/RV function WNL and no valvular dysfunction noted (full report above).      #AF/AFL  - continue Sotalol 80mg BID  - CHADVASC Score= 3  - Patient refusing AC at this time,  continue Aspirin  - Monitor lytes and replete K>4.0 and Mg>2.0  - Denies any blurry vision or visual disturbances - attributes some temporary vision changes this AM from the window blinds  - Follow up with Mert Maldonado MD on 4/15 at 0745AM after discharge   - Patient to follow up with outpatient Cards Dr Aguilar after discharge

## 2021-03-09 NOTE — CHART NOTE - NSCHARTNOTEFT_GEN_A_CORE
Did not give 3/7 PM dose of Sotalol given QTc 519 after last dose, rpt EKG in am prior to next dose
SUBJECTIVE:    CC: "I feel ok"  HPI: Notified by RN that Pt showed 13 beats of VTach on monitor. Pt is currently asymptomatic w/ no current complains.     OBJECTIVE:  Vital Signs Last 24 Hrs  T(C): 36.4 (03-08-21 @ 23:58), Max: 37.1 (03-08-21 @ 21:49)  T(F): 97.6 (03-08-21 @ 23:58), Max: 98.8 (03-08-21 @ 21:49)  HR: 65 (03-08-21 @ 23:58) (56 - 65)  BP: 139/88 (03-08-21 @ 23:58) (128/81 - 147/83)  RR: 20 (03-08-21 @ 23:58) (18 - 20)  SpO2: 98% (03-08-21 @ 23:58) (97% - 99%) on (O2)    Physical Exam:   General: NAD, A&Ox3, WN/WD  Resp: CTA b/l, no wheezing, rales, rhonchi  Cardio: RRR, nl S1/2      03-08    129<L>  |  94<L>  |  17  ----------------------------<  95  5.1   |  19<L>  |  0.99    Ca    9.4      08 Mar 2021 07:16  Phos  3.4     03-07  Mg     1.9     03-07      Reviewed all relevant lab results, tests, radiology studies, medications, telemetry, nursing assessments, and EKG.     EKG: Sinus bradycardia, QTc: 463    ASSESSMENT:    70y Male admitted with complaints of Patient is a 70y old  Male who presents with a chief complaint of ICD shock (08 Mar 2021 17:19)   Now w/ Asymptomatic, Non-sustained V Tach    Problem:   - Non-sustained Ventricular Tachycardia    PLAN:  - BMP, Mg + Phos, replete lytes prn  - Continue monitoring on Telemetry  - EP recs in am     Will continue to monitor

## 2021-03-09 NOTE — PROGRESS NOTE ADULT - ASSESSMENT
70yoM w/ PMHx NICM s/p single lead [employer] AICD, HTN presents after AICD fire found to have episode of SVT w/ inappropriate therapy admitted for further eval      Problem/Plan - 1:  ·  Problem: SVT (supraventricular tachycardia).  Plan: -Pt w/ episodes of SVT w/ shock and ATP  -SVT suspected to be afib/aflutter  -appreciate EP help. Started on Sotalol   -Refusing AC and said will D/W EP .   -continue with metoprolol  -  < from: Transthoracic Echocardiogram (03.05.21 @ 14:30) >  CONCLUSIONS:  1. Bileaflet mitral valve prolapse. Mild-moderate mitral  regurgitation.  2. Calcified trileaflet aortic valve with normal opening.  Mild aortic regurgitation.  3. Mildly dilated left atrium.  LA volume index = 38 cc/m2.  4. Normal left ventricular internal dimensions and wall  thicknesses.  5. Normal left ventricular systolic function. No segmental  wall motion abnormalities.  6. Mild diastolic dysfunction (Stage I).  7. Normal right ventricular size and function.  A device  wire is noted in the right heart.  -----------------------------------------    < end of copied text >     Problem/Plan - 2:  ·  Problem: Nonischemic cardiomyopathy with Chronic Systolic CHSF .  Plan: -continue with lisinopril, metoprolol, statin  -F/u TTE.   - Cards helping .    Problem/Plan - 3:  ·  Problem: Hyponatremia with Hyperkalemia   Plan: Normal now.      Problem/Plan - 4:  ·  Problem: Need for prophylactic measure.  Plan: DVT ppx: lovenox.

## 2021-03-09 NOTE — PROGRESS NOTE ADULT - ATTENDING COMMENTS
agree with the above assessment and plan by ASHOK Curiel.  CV stable  Sotalol started monitor QT  AC per EPS  No diuretic need at this time
69 yo Male with PMH of HTN and NICM s/p single lead ICD ([employer]). Patient presented after AICD fire on 2/28 and one episode of tachycardia on 3/4 which broke after one successful ATP.   ICD interrogation revealed episodes of SVT with inappropriate therapy, SVT likely suspected to be AFib/AFlutter (CHADs-VASC score: 3). Lengthy discussion with patient regarding benefits of AC, Cardiology also reviewed importance. However, patient refusing AC at this time and states he will follow up with his cardiologist on an outpatient basis.  Cont sotalol. Will follow.
Agree with above NP note.  cv stable  cont current tx  eps eval   renal eval for hyponatremia
Patient seen and examined, agree with the above assessment and plan by ASHOK Curiel.  CV stable  Sotalol started monitor QT  AC per EPS  No diuretic need at this time
69 yo Male with PMH of HTN and NICM s/p single lead ICD ([employer]). Patient presented after AICD fire on 2/28 and one episode of tachycardia on 3/4 which broke after one successful ATP.   ICD interrogation revealed episodes of SVT with inappropriate therapy, SVT likely suspected to be AFib/AFlutter (CHADs-VASC score: 3). Lengthy discussion with patient regarding benefits of AC, Cardiology also reviewed importance. However, patient refusing AC at this time and states he will follow up with his cardiologist on an outpatient basis.  Cont sotalol. Will follow.
Agree with above NP note.  cv stable  cont current tx  eps eval   renal eval for hyponatremia

## 2021-04-13 PROBLEM — I42.8 OTHER CARDIOMYOPATHIES: Chronic | Status: ACTIVE | Noted: 2021-03-04

## 2021-04-15 ENCOUNTER — APPOINTMENT (OUTPATIENT)
Dept: ELECTROPHYSIOLOGY | Facility: CLINIC | Age: 71
End: 2021-04-15

## 2021-04-15 ENCOUNTER — NON-APPOINTMENT (OUTPATIENT)
Age: 71
End: 2021-04-15

## 2021-04-19 ENCOUNTER — APPOINTMENT (OUTPATIENT)
Dept: ELECTROPHYSIOLOGY | Facility: CLINIC | Age: 71
End: 2021-04-19
Payer: COMMERCIAL

## 2021-04-19 VITALS — TEMPERATURE: 98.3 F | WEIGHT: 151 LBS | OXYGEN SATURATION: 99 % | HEART RATE: 61 BPM | BODY MASS INDEX: 21.06 KG/M2

## 2021-04-19 VITALS — HEART RATE: 63 BPM | SYSTOLIC BLOOD PRESSURE: 156 MMHG | DIASTOLIC BLOOD PRESSURE: 84 MMHG

## 2021-04-19 PROCEDURE — 99214 OFFICE O/P EST MOD 30 MIN: CPT

## 2021-04-19 PROCEDURE — 99072 ADDL SUPL MATRL&STAF TM PHE: CPT

## 2021-04-19 PROCEDURE — 93282 PRGRMG EVAL IMPLANTABLE DFB: CPT

## 2021-04-19 RX ORDER — ATORVASTATIN CALCIUM 10 MG/1
10 TABLET, FILM COATED ORAL DAILY
Refills: 0 | Status: ACTIVE | COMMUNITY

## 2021-04-19 RX ORDER — FINASTERIDE 1 MG/1
1 TABLET ORAL
Qty: 60 | Refills: 0 | Status: DISCONTINUED | COMMUNITY
Start: 2021-04-06

## 2021-04-19 NOTE — HISTORY OF PRESENT ILLNESS
[FreeTextEntry1] : Kush Aguilar MD\par \par Henrik Bauer is a 69y/o man with Hx of NICM with CAC and noted LVEF 30% s/p ICD placement (2013) and now with recurrent SVT/AT s/p ICD shocks, on Sotalol, who presents today for initial evaluation. Was recently hospitalized in March 2021 for recurrent ICD shocks for SVT/AT, suspected to be atrial fib/flutter. Was initiated on Sotalol and Eliquis (has not initiated Eliquis as of yet). Since that time, has been on Sotalol. Had another recurrent ICD shock for AT. Nervous about future recurrent shocks. Has not yet initiated Eliquis due to fear of bleeding but does have Rx at home. Denies chest pain, palpitations, SOB, syncope or near syncope.\par

## 2021-04-19 NOTE — PHYSICAL EXAM
[General Appearance - Well Developed] : well developed [Normal Appearance] : normal appearance [Well Groomed] : well groomed [General Appearance - Well Nourished] : well nourished [No Deformities] : no deformities [General Appearance - In No Acute Distress] : no acute distress [Normal Conjunctiva] : the conjunctiva exhibited no abnormalities [Eyelids - No Xanthelasma] : the eyelids demonstrated no xanthelasmas [Normal Oral Mucosa] : normal oral mucosa [No Oral Pallor] : no oral pallor [No Oral Cyanosis] : no oral cyanosis [Normal Jugular Venous A Waves Present] : normal jugular venous A waves present [Normal Jugular Venous V Waves Present] : normal jugular venous V waves present [No Jugular Venous Alcaraz A Waves] : no jugular venous alcaraz A waves [Heart Rate And Rhythm] : heart rate and rhythm were normal [Heart Sounds] : normal S1 and S2 [Murmurs] : no murmurs present [Respiration, Rhythm And Depth] : normal respiratory rhythm and effort [Exaggerated Use Of Accessory Muscles For Inspiration] : no accessory muscle use [Auscultation Breath Sounds / Voice Sounds] : lungs were clear to auscultation bilaterally [Abdomen Soft] : soft [Abdomen Tenderness] : non-tender [Abdomen Mass (___ Cm)] : no abdominal mass palpated [Abnormal Walk] : normal gait [Gait - Sufficient For Exercise Testing] : the gait was sufficient for exercise testing [Nail Clubbing] : no clubbing of the fingernails [Petechial Hemorrhages (___cm)] : no petechial hemorrhages [Cyanosis, Localized] : no localized cyanosis [Skin Color & Pigmentation] : normal skin color and pigmentation [] : no rash [No Venous Stasis] : no venous stasis [Skin Lesions] : no skin lesions [No Skin Ulcers] : no skin ulcer [No Xanthoma] : no  xanthoma was observed [Oriented To Time, Place, And Person] : oriented to person, place, and time [Affect] : the affect was normal [Mood] : the mood was normal [No Anxiety] : not feeling anxious

## 2021-04-19 NOTE — DISCUSSION/SUMMARY
[FreeTextEntry1] : Henrik Bauer is a 69y/o man with Hx of NICM with CAC and noted LVEF 30% s/p ICD placement (2013) and now with recurrent SVT/AT s/p ICD shocks, on Sotalol, who presents today for initial evaluation. \par \par Impression:\par \par 1. Afib/Aflutter: EKG performed today to assess for presence of afib and reveals NSR. Recurrent ICD shocks for AT and questionable afib with RVR. Initiated on Sotalol in the hospital with recurrent ICD shock. Given recurrent afib/aflutter despite rate control/antiarrhythmic, recommend undergoing ablation. Risks, benefits, and alternatives to procedure discussed at length. Risks including that of bleeding, infection, stroke, and cardiac tamponade discussed and he verbalizes understanding of all. Will hold all medications the morning of the ablation. May take all regularly scheduled medications the night before. Will initiate Eliquis 5mg BID as prescribed. \par \par 2. HTN: resume oral antihypertensives as prescribed. Encouraged heart healthy diet, sodium restriction, and weight loss. Continue regular f/u with Cardiologist for further HTN management.\par \par 3. HLD: resume statin therapy as prescribed and regular f/u with Cardiologist for routine lipid monitoring and management.\par \par 4. NICM: s/p ICD placement. Device checked today and revealed normal ICD functioning. Recurrent ICD shocks for AT. Resume routine device checks and remote monitoring as scheduled. \par \par Plan for AT ablation.

## 2021-05-06 ENCOUNTER — OUTPATIENT (OUTPATIENT)
Dept: OUTPATIENT SERVICES | Facility: HOSPITAL | Age: 71
LOS: 1 days | End: 2021-05-06

## 2021-05-06 VITALS
TEMPERATURE: 97 F | OXYGEN SATURATION: 98 % | HEART RATE: 57 BPM | RESPIRATION RATE: 16 BRPM | DIASTOLIC BLOOD PRESSURE: 80 MMHG | SYSTOLIC BLOOD PRESSURE: 120 MMHG | WEIGHT: 147.93 LBS | HEIGHT: 71 IN

## 2021-05-06 DIAGNOSIS — I48.92 UNSPECIFIED ATRIAL FLUTTER: ICD-10-CM

## 2021-05-06 DIAGNOSIS — Z95.810 PRESENCE OF AUTOMATIC (IMPLANTABLE) CARDIAC DEFIBRILLATOR: Chronic | ICD-10-CM

## 2021-05-06 LAB
ALBUMIN SERPL ELPH-MCNC: 4.9 G/DL — SIGNIFICANT CHANGE UP (ref 3.3–5)
ALP SERPL-CCNC: 49 U/L — SIGNIFICANT CHANGE UP (ref 40–120)
ALT FLD-CCNC: 12 U/L — SIGNIFICANT CHANGE UP (ref 4–41)
ANION GAP SERPL CALC-SCNC: 12 MMOL/L — SIGNIFICANT CHANGE UP (ref 7–14)
AST SERPL-CCNC: 17 U/L — SIGNIFICANT CHANGE UP (ref 4–40)
BILIRUB SERPL-MCNC: 0.7 MG/DL — SIGNIFICANT CHANGE UP (ref 0.2–1.2)
BLD GP AB SCN SERPL QL: NEGATIVE — SIGNIFICANT CHANGE UP
BUN SERPL-MCNC: 22 MG/DL — SIGNIFICANT CHANGE UP (ref 7–23)
CALCIUM SERPL-MCNC: 9.7 MG/DL — SIGNIFICANT CHANGE UP (ref 8.4–10.5)
CHLORIDE SERPL-SCNC: 97 MMOL/L — LOW (ref 98–107)
CO2 SERPL-SCNC: 26 MMOL/L — SIGNIFICANT CHANGE UP (ref 22–31)
CREAT SERPL-MCNC: 1.05 MG/DL — SIGNIFICANT CHANGE UP (ref 0.5–1.3)
GLUCOSE SERPL-MCNC: 107 MG/DL — HIGH (ref 70–99)
HCT VFR BLD CALC: 50.3 % — HIGH (ref 39–50)
HGB BLD-MCNC: 16.6 G/DL — SIGNIFICANT CHANGE UP (ref 13–17)
MCHC RBC-ENTMCNC: 30.3 PG — SIGNIFICANT CHANGE UP (ref 27–34)
MCHC RBC-ENTMCNC: 33 GM/DL — SIGNIFICANT CHANGE UP (ref 32–36)
MCV RBC AUTO: 92 FL — SIGNIFICANT CHANGE UP (ref 80–100)
NRBC # BLD: 0 /100 WBCS — SIGNIFICANT CHANGE UP
NRBC # FLD: 0 K/UL — SIGNIFICANT CHANGE UP
PLATELET # BLD AUTO: 204 K/UL — SIGNIFICANT CHANGE UP (ref 150–400)
POTASSIUM SERPL-MCNC: 4.1 MMOL/L — SIGNIFICANT CHANGE UP (ref 3.5–5.3)
POTASSIUM SERPL-SCNC: 4.1 MMOL/L — SIGNIFICANT CHANGE UP (ref 3.5–5.3)
PROT SERPL-MCNC: 7.5 G/DL — SIGNIFICANT CHANGE UP (ref 6–8.3)
RBC # BLD: 5.47 M/UL — SIGNIFICANT CHANGE UP (ref 4.2–5.8)
RBC # FLD: 13.2 % — SIGNIFICANT CHANGE UP (ref 10.3–14.5)
RH IG SCN BLD-IMP: POSITIVE — SIGNIFICANT CHANGE UP
SODIUM SERPL-SCNC: 135 MMOL/L — SIGNIFICANT CHANGE UP (ref 135–145)
WBC # BLD: 8.89 K/UL — SIGNIFICANT CHANGE UP (ref 3.8–10.5)
WBC # FLD AUTO: 8.89 K/UL — SIGNIFICANT CHANGE UP (ref 3.8–10.5)

## 2021-05-06 NOTE — H&P PST ADULT - HISTORY OF PRESENT ILLNESS
70yoM w/ PMHx NICM s/p single lead [place] [employer] AICD, HTN presents after AICD fire on 2/28 and one episode of tachycardia today 3/4, broke after one successful ATP.  Patient states on 2/28, he woke up feeling short of breath and weak, felt a shock when he stood up and has since felt very weak.  Remote monitoring alerted outpatient cardiology office today and patient was advised to go the ER.  Denies any changes in activity level, chest pain, palpitations, lightheadedness, dizziness, LOC, fever, chills, LE edema.  States his wife passed away about 1 year ago (not COVID related) and in recent weeks has seen a decrease in his appetite.     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  Has been maintained on toprol 100mg PO since.  One episode of tachycardia that met requirements for tachy therapy noted on remote telemetry in 2019 for which patient was notified but patient has not followed up with outpatient clinic since 2017.    Pt had AICD interrogated in the ED, demonstrated episodes of SVT  (likely a fib, a flutter) with inappropriate therapy.    Pt is a 70 yr old male scheduled for Complex ablation with dr Maldonado tentatively 5/25/21 - pt hx NICM s/p single lead [place] [employer] AICD, HTN hospitalized 3/21 after several firings of AICD and episode of tachycardia  3/4. Pt had AICD interrogated in the ED, demonstrated episodes of SVT  (likely a fib, a flutter) with inappropriate therapy. Pt is poor historian   Pt denies COVID   Pt hx Moderna vaccine  Pt to have COVID preop test

## 2021-05-06 NOTE — H&P PST ADULT - RS GEN PE MLT RESP DETAILS PC
Problem: Adult Inpatient Plan of Care  Goal: Plan of Care Review  Outcome: Ongoing (interventions implemented as appropriate)  Pt educated on handwashing and infection control.  Pt verbalized understanding.         airway patent/respirations non-labored/clear to auscultation bilaterally

## 2021-05-06 NOTE — H&P PST ADULT - NSICDXPASTMEDICALHX_GEN_ALL_CORE_FT
PAST MEDICAL HISTORY:  Nonischemic cardiomyopathy      PAST MEDICAL HISTORY:  HTN (hypertension)     Nonischemic cardiomyopathy

## 2021-05-06 NOTE — H&P PST ADULT - NSICDXPROBLEM_GEN_ALL_CORE_FT
PROBLEM DIAGNOSES  Problem: Unspecified atrial flutter  Assessment and Plan: Pt scheduled for surgery and preop instructions on the day of surgery, given verbally and with use of  written materials, and patient confirming understanding of such instructions using  teach back method.  Pt to follow preop med instructions as per EP   OR booking notified of AICD and fernando precautions

## 2021-05-06 NOTE — H&P PST ADULT - CARDIOVASCULAR COMMENTS
Pt hx ICD with frequent firing AICD placed upper left chest Pt hx ICD placed 2013 with inconsistent f/u - had several episodes of firing late February /early March with recurrent SVT/AT

## 2021-05-21 DIAGNOSIS — Z01.818 ENCOUNTER FOR OTHER PREPROCEDURAL EXAMINATION: ICD-10-CM

## 2021-05-22 ENCOUNTER — APPOINTMENT (OUTPATIENT)
Dept: DISASTER EMERGENCY | Facility: CLINIC | Age: 71
End: 2021-05-22

## 2021-05-23 LAB — SARS-COV-2 N GENE NPH QL NAA+PROBE: NOT DETECTED

## 2021-05-25 ENCOUNTER — INPATIENT (INPATIENT)
Facility: HOSPITAL | Age: 71
LOS: 2 days | Discharge: HOME | End: 2021-05-28
Attending: INTERNAL MEDICINE | Admitting: INTERNAL MEDICINE
Payer: COMMERCIAL

## 2021-05-25 ENCOUNTER — OUTPATIENT (OUTPATIENT)
Dept: OUTPATIENT SERVICES | Facility: HOSPITAL | Age: 71
LOS: 1 days | Discharge: HOME | End: 2021-05-25
Payer: COMMERCIAL

## 2021-05-25 VITALS — HEIGHT: 71 IN

## 2021-05-25 DIAGNOSIS — I48.92 UNSPECIFIED ATRIAL FLUTTER: ICD-10-CM

## 2021-05-25 DIAGNOSIS — Z95.810 PRESENCE OF AUTOMATIC (IMPLANTABLE) CARDIAC DEFIBRILLATOR: Chronic | ICD-10-CM

## 2021-05-25 DIAGNOSIS — I47.2 VENTRICULAR TACHYCARDIA: ICD-10-CM

## 2021-05-25 LAB
ALBUMIN SERPL ELPH-MCNC: 1.5 G/DL — LOW (ref 3.3–5)
ALP SERPL-CCNC: <10 U/L — LOW (ref 40–120)
ALT FLD-CCNC: 20 U/L — SIGNIFICANT CHANGE UP (ref 4–41)
ANION GAP SERPL CALC-SCNC: 14 MMOL/L — SIGNIFICANT CHANGE UP (ref 7–14)
APTT BLD: 29.5 SEC — SIGNIFICANT CHANGE UP (ref 27–36.3)
AST SERPL-CCNC: 50 U/L — HIGH (ref 4–40)
BASOPHILS # BLD AUTO: 0.05 K/UL — SIGNIFICANT CHANGE UP (ref 0–0.2)
BASOPHILS NFR BLD AUTO: 0.4 % — SIGNIFICANT CHANGE UP (ref 0–2)
BILIRUB SERPL-MCNC: 0.3 MG/DL — SIGNIFICANT CHANGE UP (ref 0.2–1.2)
BUN SERPL-MCNC: 11 MG/DL — SIGNIFICANT CHANGE UP (ref 7–23)
CALCIUM SERPL-MCNC: 3.8 MG/DL — CRITICAL LOW (ref 8.4–10.5)
CHLORIDE SERPL-SCNC: 124 MMOL/L — HIGH (ref 98–107)
CO2 SERPL-SCNC: 7 MMOL/L — CRITICAL LOW (ref 22–31)
CREAT SERPL-MCNC: 0.62 MG/DL — SIGNIFICANT CHANGE UP (ref 0.5–1.3)
EOSINOPHIL # BLD AUTO: 0.02 K/UL — SIGNIFICANT CHANGE UP (ref 0–0.5)
EOSINOPHIL NFR BLD AUTO: 0.2 % — SIGNIFICANT CHANGE UP (ref 0–6)
GLUCOSE SERPL-MCNC: 97 MG/DL — SIGNIFICANT CHANGE UP (ref 70–99)
HCT VFR BLD CALC: 40.6 % — SIGNIFICANT CHANGE UP (ref 39–50)
HGB BLD-MCNC: 12.9 G/DL — LOW (ref 13–17)
IANC: 10.77 K/UL — HIGH (ref 1.5–8.5)
IMM GRANULOCYTES NFR BLD AUTO: 0.9 % — SIGNIFICANT CHANGE UP (ref 0–1.5)
INR BLD: 1.2 RATIO — HIGH (ref 0.88–1.16)
LYMPHOCYTES # BLD AUTO: 15.6 % — SIGNIFICANT CHANGE UP (ref 13–44)
LYMPHOCYTES # BLD AUTO: 2.07 K/UL — SIGNIFICANT CHANGE UP (ref 1–3.3)
MAGNESIUM SERPL-MCNC: 1 MG/DL — CRITICAL LOW (ref 1.6–2.6)
MCHC RBC-ENTMCNC: 30.9 PG — SIGNIFICANT CHANGE UP (ref 27–34)
MCHC RBC-ENTMCNC: 31.8 GM/DL — LOW (ref 32–36)
MCV RBC AUTO: 97.1 FL — SIGNIFICANT CHANGE UP (ref 80–100)
MONOCYTES # BLD AUTO: 0.26 K/UL — SIGNIFICANT CHANGE UP (ref 0–0.9)
MONOCYTES NFR BLD AUTO: 2 % — SIGNIFICANT CHANGE UP (ref 2–14)
NEUTROPHILS # BLD AUTO: 10.77 K/UL — HIGH (ref 1.8–7.4)
NEUTROPHILS NFR BLD AUTO: 80.9 % — HIGH (ref 43–77)
NRBC # BLD: 0 /100 WBCS — SIGNIFICANT CHANGE UP
NRBC # FLD: 0 K/UL — SIGNIFICANT CHANGE UP
NT-PROBNP SERPL-SCNC: 630 PG/ML — HIGH
PHOSPHATE SERPL-MCNC: 3 MG/DL — SIGNIFICANT CHANGE UP (ref 2.5–4.5)
PLATELET # BLD AUTO: 181 K/UL — SIGNIFICANT CHANGE UP (ref 150–400)
POTASSIUM SERPL-MCNC: 2.9 MMOL/L — CRITICAL LOW (ref 3.5–5.3)
POTASSIUM SERPL-SCNC: 2.9 MMOL/L — CRITICAL LOW (ref 3.5–5.3)
PROT SERPL-MCNC: 2.4 G/DL — LOW (ref 6–8.3)
PROTHROM AB SERPL-ACNC: 13.6 SEC — SIGNIFICANT CHANGE UP (ref 10.6–13.6)
RBC # BLD: 4.18 M/UL — LOW (ref 4.2–5.8)
RBC # FLD: 13.1 % — SIGNIFICANT CHANGE UP (ref 10.3–14.5)
SODIUM SERPL-SCNC: 145 MMOL/L — SIGNIFICANT CHANGE UP (ref 135–145)
TROPONIN T, HIGH SENSITIVITY RESULT: 1149 NG/L — CRITICAL HIGH
TSH SERPL-MCNC: 1.59 UIU/ML — SIGNIFICANT CHANGE UP (ref 0.27–4.2)
WBC # BLD: 13.29 K/UL — HIGH (ref 3.8–10.5)
WBC # FLD AUTO: 13.29 K/UL — HIGH (ref 3.8–10.5)

## 2021-05-25 PROCEDURE — 93655 ICAR CATH ABLTJ DSCRT ARRHYT: CPT

## 2021-05-25 PROCEDURE — 99291 CRITICAL CARE FIRST HOUR: CPT | Mod: 25

## 2021-05-25 PROCEDURE — 93656 COMPRE EP EVAL ABLTJ ATR FIB: CPT

## 2021-05-25 PROCEDURE — 93010 ELECTROCARDIOGRAM REPORT: CPT

## 2021-05-25 PROCEDURE — 93010 ELECTROCARDIOGRAM REPORT: CPT | Mod: 76

## 2021-05-25 PROCEDURE — 93662 INTRACARDIAC ECG (ICE): CPT | Mod: 26

## 2021-05-25 PROCEDURE — 71045 X-RAY EXAM CHEST 1 VIEW: CPT | Mod: 26

## 2021-05-25 PROCEDURE — 93613 INTRACARDIAC EPHYS 3D MAPG: CPT

## 2021-05-25 RX ORDER — PANTOPRAZOLE SODIUM 20 MG/1
1 TABLET, DELAYED RELEASE ORAL
Qty: 30 | Refills: 0
Start: 2021-05-25 | End: 2021-06-23

## 2021-05-25 RX ORDER — ETOMIDATE 2 MG/ML
10 INJECTION INTRAVENOUS ONCE
Refills: 0 | Status: COMPLETED | OUTPATIENT
Start: 2021-05-25 | End: 2021-05-25

## 2021-05-25 RX ORDER — SOTALOL HCL 120 MG
0 TABLET ORAL
Qty: 0 | Refills: 0 | DISCHARGE

## 2021-05-25 RX ORDER — POTASSIUM CHLORIDE 20 MEQ
40 PACKET (EA) ORAL ONCE
Refills: 0 | Status: DISCONTINUED | OUTPATIENT
Start: 2021-05-25 | End: 2021-05-25

## 2021-05-25 RX ORDER — POTASSIUM CHLORIDE 20 MEQ
10 PACKET (EA) ORAL
Refills: 0 | Status: DISCONTINUED | OUTPATIENT
Start: 2021-05-25 | End: 2021-05-25

## 2021-05-25 RX ORDER — APIXABAN 2.5 MG/1
5 TABLET, FILM COATED ORAL ONCE
Refills: 0 | Status: COMPLETED | OUTPATIENT
Start: 2021-05-25 | End: 2021-05-25

## 2021-05-25 RX ORDER — MAGNESIUM SULFATE 500 MG/ML
2 VIAL (ML) INJECTION ONCE
Refills: 0 | Status: DISCONTINUED | OUTPATIENT
Start: 2021-05-25 | End: 2021-05-25

## 2021-05-25 RX ORDER — SODIUM CHLORIDE 9 MG/ML
3 INJECTION INTRAMUSCULAR; INTRAVENOUS; SUBCUTANEOUS EVERY 8 HOURS
Refills: 0 | Status: DISCONTINUED | OUTPATIENT
Start: 2021-05-25 | End: 2021-06-08

## 2021-05-25 RX ORDER — SODIUM CHLORIDE 9 MG/ML
1000 INJECTION, SOLUTION INTRAVENOUS
Refills: 0 | Status: DISCONTINUED | OUTPATIENT
Start: 2021-05-25 | End: 2021-05-25

## 2021-05-25 RX ORDER — SOTALOL HCL 120 MG
120 TABLET ORAL ONCE
Refills: 0 | Status: COMPLETED | OUTPATIENT
Start: 2021-05-25 | End: 2021-05-25

## 2021-05-25 RX ORDER — SODIUM CHLORIDE 9 MG/ML
1000 INJECTION INTRAMUSCULAR; INTRAVENOUS; SUBCUTANEOUS ONCE
Refills: 0 | Status: COMPLETED | OUTPATIENT
Start: 2021-05-25 | End: 2021-05-25

## 2021-05-25 RX ORDER — APIXABAN 2.5 MG/1
1 TABLET, FILM COATED ORAL
Qty: 60 | Refills: 0
Start: 2021-05-25 | End: 2021-06-23

## 2021-05-25 RX ADMIN — Medication 120 MILLIGRAM(S): at 23:15

## 2021-05-25 RX ADMIN — ETOMIDATE 10 MILLIGRAM(S): 2 INJECTION INTRAVENOUS at 21:50

## 2021-05-25 RX ADMIN — APIXABAN 5 MILLIGRAM(S): 2.5 TABLET, FILM COATED ORAL at 23:26

## 2021-05-25 RX ADMIN — SODIUM CHLORIDE 3 MILLILITER(S): 9 INJECTION INTRAMUSCULAR; INTRAVENOUS; SUBCUTANEOUS at 17:04

## 2021-05-25 RX ADMIN — SODIUM CHLORIDE 1000 MILLILITER(S): 9 INJECTION INTRAMUSCULAR; INTRAVENOUS; SUBCUTANEOUS at 22:39

## 2021-05-25 NOTE — CONSULT NOTE ADULT - ASSESSMENT
70M pmhx of NICM s/p single lead [place] [employer] AICD, HTN, s/p ablation for afib/AT (5/25/21) present with sudden on set of weakness and diaphoresis X 1hour after the discharge from [facility] today.  EP consult called for VT and s/p shock x2.      PLAN  #VT - s/p 120J X1, 200J x1, etomidate 10mg ivp, 1 liter NS bolus, s/p shock -->/56 (70), HR 62, RR 20. patient denies CP or any discomfort.   -continue tele  -give sotalol 120mg po X1 now (given in ED), and continue 120mg PO BID as per Dr. Maldonado.  -c/w Eliquis 5mg po BID  -pantoprazole 40mg po daily  -keep K>4, mg >2     #NICM  -c/w ASA 81mg po daily  -c/w lipitor 10mg po every other day    #HTN  -c/w lisinopril 10mg po daily      Case discussed with Dr. Maldonado  Attending attestation to follow.

## 2021-05-25 NOTE — ED PROVIDER NOTE - ATTENDING CONTRIBUTION TO CARE
Patient is a 69 yo M with history of HTN, afib, AICD, SVT, s/p ablation today, discharged earlier today when he states he didn't feel well and didn't let his friend drive him home. Patient was pale, weak, diaphoretic. On arrival to the ED, he was noted to by hypotensive, with a HR of 170s. Patient denied chest pain but felt dizzy. No shortness of breath, nausea, vomiting.     VS noted  Gen. pale, diaphoretic  HEENT: EOMI, mmm  Lungs: CTAB/L no C/ W /R   CVS: RRR, tachycardic  Abd; Soft non tender, non distended   Ext: no edema  Skin: no rash  Neuro AAOx3 non focal clear speech  a/p: wide complex tachycardia on monitor and ekg - concerning for vtach. Patient given etomidate and shocked twice with resulting sinus rhythm. He was closely monitored while cardiology/ EP was called and he was admitted for further monitoring and treatment.   - Raymundo BRIONES

## 2021-05-25 NOTE — CHART NOTE - NSCHARTNOTEFT_GEN_A_CORE
The patient presents today for elective A. Fib/AT ablation.  see hard copy of H&P from [employer] and PST.  The patient denies chest pain, presyncope, syncope,  headache, visual disturbances, CVA, PE, DVT, TREY, abdominal pain, N/V/D/C, hematochezia, melena, dysuria, hematuria, fever, chills.   The patient denies any new complaints since the last time he was seen by Dr. Persaud.  The patient claims that never took Eliquis even though he was prescribed it. Dr. Maldonado was made aware and instructed the patient regarding compliance with medications, including Eliquis post ablation. The patient presents today for elective A. Fib/AT ablation.  see hard copy of H&P from [employer] and PST.  The patient denies chest pain, presyncope, syncope,  headache, visual disturbances, CVA, PE, DVT, TREY, abdominal pain, N/V/D/C, hematochezia, melena, dysuria, hematuria, fever, chills.   The patient denies any new complaints since the last time he was seen by Dr. Persaud.  The patient claims that never took Eliquis even though he was prescribed it. Dr. Maldonado was made aware and instructed the patient regarding compliance with medications, including Eliquis post ablation.  Medications reviewed.

## 2021-05-25 NOTE — ED ADULT NURSE NOTE - OBJECTIVE STATEMENT
facilitator RN: pt received to room trauma a, minimally responsive, cold pale and diaphoretic. vtach  with pulse, s/p cath and ablation for afib earlier today. BP originally stable rpt hypotensive 90s systolic. placed on ZOLL monitor with pads. 20G IV access obtained left forearm, not able to obtain labs at this time. ekg complete. 22G IV placed right hand. pt premedicated and cardioverted @ 2200 with 150J, with no change in rhythm MAP 61 unable to obtain BP, pt with decreased LOC, cardioverted again at 2202 200J with conversion to nga sinus arrythmia rpt ekg obtained. VSS at this time. pt sedated from medication but slowly coming to, shivering, covered with warm blankets. rectally afebrile. dressing at right inguinal area from cath procedure earlier today. pt repositioned in bed, additional warm blankets provided, on cardiac monitor with VS q5m, pt still on ZOLL monitor with pads as precaution. labs and COVID collected and sent to labs.

## 2021-05-25 NOTE — ED PROVIDER NOTE - PHYSICAL EXAMINATION
Gen: ill/weak appearing   Head: NCAT  HEENT: EOMI, oral mucosa moist, normal conjunctiva  Lung: CTAB  CV: tachycardic, regular rhythm   Abd: soft, NTND  MSK: no visible deformities  Neuro: No focal sensory or motor deficits   Skin: pale, diaphoretic   Psych: normal affect.     Zaid Holman PGY3

## 2021-05-25 NOTE — CONSULT NOTE ADULT - SUBJECTIVE AND OBJECTIVE BOX
CC:  Patient is a 70y old  Male who presents with a chief complaint of weakness and diaphoresis X 1 hour    HPI:  70M pmhx of NICM s/p single lead [place] [employer] AICD, HTN, present with sudden on set of weakness and diaphoresis X 1hour after the discharge from [facility] today. Earlier today, he received complex ablation for afib/AT which was successful (successful isolation of pulm vein in antral circumferential approach, posterial wall isolation and CTI ablation with bidirectional block). He was discharged and while in the car, he felt weak and diaphoresis, decided to come back to hospital. Patient denies CP, SOB, n/v/d, dysuria, fever or chills.     Of note, 3/. Pt had AICD interrogated in the ED, demonstrated episodes of SVT  (likely a fib, a flutter) with inappropriate therapy    In Ed, he was found to be in VT, received etomidate 10mg IVP X1, 120j shock X1, then 200j shock X1 which converted his rhythm to NSR HR 60's. NS 1 liter x1, Sotalol 120mg po X1, Eliquis 5mg po X1. /56 (70), HR 62, RR 20. EP consult called for VT and s/p shock.         Allergies    No Known Allergies    Intolerances    	    MEDICATIONS                          PAST MEDICAL & SURGICAL HISTORY:  Nonischemic cardiomyopathy    HTN (hypertension)    S/P implantation of automatic cardioverter/defibrillator (AICD)        FAMILY HISTORY:  No pertinent family history in first degree relatives        SOCIAL HISTORY    Marital Status:   Occupation:   Lives with:     SUBSTANCE USE  Tobacco Usage:  ( ) None ( ) never smoked   ( ) former smoker  ( ) current smoker; Packs per day:   Alcohol Usage: ( ) none  ( ) occasional ( ) 2-3 times a week ( ) daily; Last drink:   Recreational drugs ( ) None    CONSTITUTIONAL: No fevers, No chills, +weakness, No weight gain  EYES: No vision changes   ENT: No congestion, No ear pain, No sore throat.  NECK: No pain, No stiffness  RESPIRATORY: No shortness of breath, No cough, No wheezing, No hemoptysis  CARDIOVASCULAR: No chest pain. No palpitations, No REGALADO, No orthopnea, No paroxysmal nocturnal dyspnea, No pleuritic pain  GASTROINTESTINAL: No abdominal pain, No nausea, No vomiting, No hematemesis, No diarrhea No constipation. No melena  GENITOURINARY: No dysuria, No frequency, No incontinence, No hematuria  NEUROLOGICAL: No dizziness, No lightheadedness, No syncope, No LOC, No headache, No numbness or weakness  MUSCULOSKELETAL: No Edema, No joint pain, No joint swelling.  PSYCHIATRIC: No anxiety, No depression  DERMATOLOGY: + diaphoresis. No itching, No rashes, No pressure ulcers  HEME/LYMPH: No easy bruising, or bleeding gums    All other review of systems is negative unless indicated above.    VITAL SIGNS  T(C): 36.9 (21 @ 22:20), Max: 36.9 (21 @ 22:20)  HR: 81 (21:59) (57 - 174)  BP: 108/66 (21 @ 22:59) (97/64 - 155/85)  RR: 19 (21 22:52) (14 - 23)  SpO2: 100% (21 22:52) (100% - 100%)  Wt(kg): --    Appearance: NAD, no distress  HEENT: Moist Mucous Membranes.  Cardiovascular: Regular rate and rhythm, Normal S1 S2, No JVD, No murmurs  Respiratory: Lungs clear to auscultation. No rales, No rhonchi, No wheezing. No tenderness to palpation  Gastrointestinal:  Soft, Non-tender, + BS  Neurologic: Non-focal, A&Ox3  Skin: Warm and dry, No rashes, No ecchymosis, No cyanosis  Musculoskeletal: No clubbing, No cyanosis, No edema  Vascular: Peripheral pulses palpable 2+ bilaterally  Psychiatry: Mood & affect appropriate      	    		        I&O's Summary      LABORATORY VALUES	 	                          12.9   13.29 )-----------( 181      ( 25 May 2021 22:35 )             40.6         Phos  3.0             Prothrombin Time, Plasma: 13.6 sec ( @ 22:53)      CARDIAC MARKERS:          Serum Pro-Brain Natriuretic Peptide: 630 pg/mL ( @ 22:35)      03-05 @ 05:43  Cholesterol, Serum - 171  Direct LDL- --  HDL Cholesterol, Serum- 76  Triglycerides, Serum- 75      Thyroid Stimulating Hormone, Serum: 1.59 uIU/mL ( @ 22:35)    	    ECG:  VT   ECG after the shock : Sinus bradycardia HR 52 with no ischemic changes	  RADIOLOGY:  OTHER: 	    PREVIOUS DIAGNOSTIC TESTING:    [ ] Echocardiogram:  < from: Transthoracic Echocardiogram (21 @ 14:30) >  < from: Cardiac Cath Lab (13 @ 15:12) >  CORONARY VESSELS: The coronary circulation is right dominant.  LM:   --  LM: Normal.  LAD:   --  LAD: Normal.  CX:   --  Circumflex: Normal.  RCA:   --  RCA: Normal.  COMPLICATIONS: There were no complications.  DIAGNOSTIC RECOMMENDATIONS:  1. Coronary angiography revealed no significant CAD.  2. Medical management and risk factor optimization.  INTERVENTIONAL RECOMMENDATIONS:  1. Coronary angiography revealed no significant CAD.  2. Medical management and risk factor optimization.  Prepared and signed by  Toya Karimi M.D.  Signed 2013 16:33:16  HEMODYNAMIC TABLES  Pressures:  Baseline  Pressures:  - HR: 78  Pressures:  - Rhythm:  Pressures:  -- Aortic Pressure (S/D/M): 155/78/104  Pressures:  -- Left Ventricle (s/edp): 143/19/--  Outputs:  Baseline  Outputs:  -- CALCULATIONS[age]  Outputs:  -- CALCULATIONS: Body Surface Area: 1.99  Outputs:  -- CALCULATIONS: Height in cm: 183.00  Outputs:  -- CALCULATIONS: Sex: Male  Outputs:  -- CALCULATIONS: Weight in k.00  Outputs:  -- OUTPUTS: O2 consumption: 248.49  Outputs:  -- OUTPUTS: Vo2 Indexed: 125.00    < end of copied text >  Ejection Fraction (Teicholtz): 59 %    CONCLUSIONS:  1. Bileaflet mitral valve prolapse. Mild-moderate mitral  regurgitation.  2. Calcified trileaflet aortic valve with normal opening.  Mild aortic regurgitation.  3. Mildly dilated left atrium.  LA volume index = 38 cc/m2.  4. Normal left ventricular internal dimensions and wall  thicknesses.  5. Normal left ventricular systolic function. No segmental  wall motion abnormalities.  6. Mild diastolic dysfunction (Stage I).  7. Normal right ventricular size and function.  A device  wire is noted in the right heart.  ------------------------------------------------------------------------  Confirmed on  3/5/2021 - 16:12:22 by Donald De La Rosa M.D.,  [facility], [place]  ------------------------------------------------------------------------   CC:  Patient is a 70y old  Male who presents with a chief complaint of weakness and diaphoresis X 1 hour    HPI:  70M pmhx of NICM s/p single lead [place] [employer] AICD, HTN, present with sudden on set of weakness and diaphoresis X 1hour after the discharge from [facility] today. Earlier today, he received complex ablation for afib/AT which was successful (successful isolation of pulm vein in antral circumferential approach, posterial wall isolation and CTI ablation with bidirectional block). He was discharged and while in the car, he felt weak and diaphoresis, decided to come back to hospital. Patient denies CP, SOB, n/v/d, dysuria, fever or chills.     Of note, 3/. Pt had AICD interrogated in the ED, demonstrated episodes of SVT  (likely a fib, a flutter) with inappropriate therapy    In Ed, he was found to be in VT, received etomidate 10mg IVP X1, 120j shock X1, then 200j shock X1 which converted his rhythm to NSR HR 60's. NS 1 liter x1, Sotalol 120mg po X1, Eliquis 5mg po X1. /56 (70), HR 62, RR 20. EP consult called for VT and s/p shock.         Allergies    No Known Allergies    Intolerances    	    MEDICATIONS                          PAST MEDICAL & SURGICAL HISTORY:  Nonischemic cardiomyopathy    HTN (hypertension)    S/P implantation of automatic cardioverter/defibrillator (AICD)        FAMILY HISTORY:  No pertinent family history in first degree relatives        SOCIAL HISTORY    Marital Status:   Occupation: retired  Lives with:     SUBSTANCE USE  Tobacco Usage:  (x) None ( ) never smoked   ( ) former smoker  ( ) current smoker; Packs per day:   Alcohol Usage: (X) none  ( ) occasional ( ) 2-3 times a week ( ) daily; Last drink:   Recreational drugs (X) None    CONSTITUTIONAL: No fevers, No chills, +weakness, No weight gain  EYES: No vision changes   ENT: No congestion, No ear pain, No sore throat.  NECK: No pain, No stiffness  RESPIRATORY: No shortness of breath, No cough, No wheezing, No hemoptysis  CARDIOVASCULAR: No chest pain. No palpitations, No REGALADO, No orthopnea, No paroxysmal nocturnal dyspnea, No pleuritic pain  GASTROINTESTINAL: No abdominal pain, No nausea, No vomiting, No hematemesis, No diarrhea No constipation. No melena  GENITOURINARY: No dysuria, No frequency, No incontinence, No hematuria  NEUROLOGICAL: No dizziness, No lightheadedness, No syncope, No LOC, No headache, No numbness or weakness  MUSCULOSKELETAL: No Edema, No joint pain, No joint swelling.  PSYCHIATRIC: No anxiety, No depression  DERMATOLOGY: + diaphoresis. No itching, No rashes, No pressure ulcers  HEME/LYMPH: No easy bruising, or bleeding gums    All other review of systems is negative unless indicated above.    VITAL SIGNS  T(C): 36.9 (21 @ 22:20), Max: 36.9 (21 @ 22:20)  HR: 81 (21:59) (57 - 174)  BP: 108/66 (21 @ 22:59) (97/64 - 155/85)  RR: 19 (21 22:52) (14 - 23)  SpO2: 100% (21 22:52) (100% - 100%)  Wt(kg): --    Appearance: NAD, no distress  HEENT: Moist Mucous Membranes.  Cardiovascular: Regular rate and rhythm, Normal S1 S2, No JVD, No murmurs  Respiratory: Lungs clear to auscultation. No rales, No rhonchi, No wheezing. No tenderness to palpation  Gastrointestinal:  Soft, Non-tender, + BS  Neurologic: Non-focal, A&Ox3  Skin: Warm and dry, No rashes, No ecchymosis, No cyanosis  Musculoskeletal: No clubbing, No cyanosis, No edema  Vascular: Peripheral pulses palpable 2+ bilaterally  Psychiatry: Mood & affect appropriate      	    		        I&O's Summary      LABORATORY VALUES	 	                          12.9   13.29 )-----------( 181      ( 25 May 2021 22:35 )             40.6         Phos  3.0             Prothrombin Time, Plasma: 13.6 sec ( @ 22:53)      CARDIAC MARKERS:          Serum Pro-Brain Natriuretic Peptide: 630 pg/mL ( @ 22:35)      03-05 @ 05:43  Cholesterol, Serum - 171  Direct LDL- --  HDL Cholesterol, Serum- 76  Triglycerides, Serum- 75      Thyroid Stimulating Hormone, Serum: 1.59 uIU/mL (05-25 @ 22:35)    	    ECG:  VT   ECG after the shock : Sinus bradycardia HR 52 with no ischemic changes	  RADIOLOGY:  OTHER: 	    PREVIOUS DIAGNOSTIC TESTING:    [ ] Echocardiogram:  < from: Transthoracic Echocardiogram (21 @ 14:30) >  < from: Cardiac Cath Lab (13 @ 15:12) >  CORONARY VESSELS: The coronary circulation is right dominant.  LM:   --  LM: Normal.  LAD:   --  LAD: Normal.  CX:   --  Circumflex: Normal.  RCA:   --  RCA: Normal.  COMPLICATIONS: There were no complications.  DIAGNOSTIC RECOMMENDATIONS:  1. Coronary angiography revealed no significant CAD.  2. Medical management and risk factor optimization.  INTERVENTIONAL RECOMMENDATIONS:  1. Coronary angiography revealed no significant CAD.  2. Medical management and risk factor optimization.  Prepared and signed by  Toya Karimi M.D.  Signed 2013 16:33:16  HEMODYNAMIC TABLES  Pressures:  Baseline  Pressures:  - HR: 78  Pressures:  - Rhythm:  Pressures:  -- Aortic Pressure (S/D/M): 155/78/104  Pressures:  -- Left Ventricle (s/edp): 143/19/--  Outputs:  Baseline  Outputs:  -- CALCULATIONS: Age in years: 62.79  Outputs:  -- CALCULATIONS: Body Surface Area: 1.99  Outputs:  -- CALCULATIONS: Height in cm: 183.00  Outputs:  -- CALCULATIONS: Sex: Male  Outputs:  -- CALCULATIONS: Weight in k.00  Outputs:  -- OUTPUTS: O2 consumption: 248.49  Outputs:  -- OUTPUTS: Vo2 Indexed: 125.00    < end of copied text >  Ejection Fraction (Teicholtz): 59 %    CONCLUSIONS:  1. Bileaflet mitral valve prolapse. Mild-moderate mitral  regurgitation.  2. Calcified trileaflet aortic valve with normal opening.  Mild aortic regurgitation.  3. Mildly dilated left atrium.  LA volume index = 38 cc/m2.  4. Normal left ventricular internal dimensions and wall  thicknesses.  5. Normal left ventricular systolic function. No segmental  wall motion abnormalities.  6. Mild diastolic dysfunction (Stage I).  7. Normal right ventricular size and function.  A device  wire is noted in the right heart.  ------------------------------------------------------------------------  Confirmed on  3/5/2021 - 16:12:22 by Donald De La Rosa M.D.,  formerly [facility], [place]  ------------------------------------------------------------------------

## 2021-05-25 NOTE — CHART NOTE - NSCHARTNOTEFT_GEN_A_CORE
Type of procedure: PVI  Licensed independent practitioner: Mert Maldonado MD  Assistant: None  Description of procedure: After informed consent was obtained, the patient was brought to the Electrophysiology laboratory in the fasting state, and was prepped and draped in the usual sterile fashion. The patient was electively intubated by members of the anesthesia department, who provided sedation throughout the case. In addition an esophageal temperature probe was placed in the esophagus to allow dynamic temperate monitoring during ablation. The patient was under uninterrupted apixaban therapy.  Sheaths were placed as described in the procedure report, and catheters were advanced into the heart under fluoroscopic guidance without complications. Baseline intra-cardiac echocardiography (ICE) demonstrated the following: The LV size and functions were normal. There was no pericardial effusion at baseline  IV Heparin bolus was given followed by continuous drip to maintain the -400s throughout the case.  The left atrium was entered under fluoroscopic and ICE guidance by a single transseptal approach using a medium curve [place] sheath. There were no complications.  A PentaRay Uziel F curve catheter and a 3.5 mm irrigated-tip [employer] ThermoCool D/F-curve ablation catheter were used for mapping and ablation. A 3D anatomy of the LA was performed using Carto 3 V7.  We first isolated the left pulmonary veins in an antral approach with demonstration of entrance block. No ronak lesions were required for isolation of the left PVs. We then similarly isolated the right pulmonary veins in an antral approach. Lesions were required in the septal ronak near the RIPV, as well as in the posterior ronak, near the RSPV, for isolation. Bilateral PVs showed evidence of entrance and exit block after pacing maneuvers. With pacing inside the PV, there was local PV capture without capture of the LA in the LSPV, LIPV and RSPV. Esophageal temperatures marci from a baseline of 35.8 degrees Celsius to a peak of 36.8 degrees Celsius.  The pulmonary veins remained isolated for >30 minutes, without evidence of acute reconnection. Burst pacing was performed from the proximal CS bipole down to 200ms without induction of any atrial arrhythmias.  Next a posterior wall isolation was performed.  Next a CTI ablation was performed with evidence of bidirectional block.  As such, heparin was stopped and the sheaths were pulled back to the right atrium. Repeat ICE imaging revealed no pericardial effusion.  All catheters were removed from the body and sheaths were left in place for removal once ACT declines to below 200 seconds.   A total of 70mg of protamine was given to reverse the Heparin effect.    The patient tolerated the procedure well and was successfully extubated and transferred to the [facility] for further monitoring. There were no complications.  During the procedure, a [employer] rep was present to manage a complex mapping system.    Findings of procedure: Successful isolation of the pulmonary veins in an antral circumferential approach. Successful posterior wall isolation and CTI ablation with bidirectional block. No acute reconnections.  Estimated blood loss: <5cc  Specimen removed: N/A  Preoperative Dx: Afib  Postoperative Dx: Afib  Complications: None  Anesthesia type: General

## 2021-05-25 NOTE — ED ADULT TRIAGE NOTE - CHIEF COMPLAINT QUOTE
c/o feeling unwell, pt is visibly pale, with cold extremities, diaphoretic, reports had  cath" procedure today and " did not recover" accompanied by land lord who states picked him up at 8:00 pm today  in triage. unalbge to obtain VS in triage.

## 2021-05-25 NOTE — ED PROVIDER NOTE - OBJECTIVE STATEMENT
70M with PMH including SVT, AICD, HTN, Afib s/p ablation today p/w generalized weakness. Was being driven home by a friend when he began to feel generally weak. Noted to be pale and diaphoretic on arrival to ER, with tachycardia to 170s and blood pressure in the 60s systolic. Denies any other symptoms including fever, chest pain, SOB, abd pain, N/V/D.

## 2021-05-25 NOTE — CHART NOTE - NSCHARTNOTEFT_GEN_A_CORE
The patient is s/p A. Fib. Ablation, noted to have  - asymptomatic prolonged QTC's on post procedure ECG. Dr. Maldonado was made aware, he also reviewed the ECG.   As per Dr. Maldonado, prolonged QTC's are due to Sotalol 120 mg PO BID, recommended to decrease Sotalol to 80 mg PO BID.  The patient was made aware, he also was instructed regarding compliance to medications. The patient is s/p A. Fib. Ablation, noted to have  - asymptomatic prolonged QTC's on post procedure ECG. Dr. Maldonado was made aware, he also reviewed the ECG.   As per Dr. Maldonado, prolonged QTC's are due to Sotalol 120 mg PO BID, recommended to decrease Sotalol to 80 mg PO BID.  The patient was made aware, he also was instructed regarding compliance to medications.  As per Dr. Maldonado, the patient should restart Eliquis 5 mg tonight, and stop Aspirin.

## 2021-05-25 NOTE — ED ADULT NURSE NOTE - INTERVENTIONS DEFINITIONS
[place] to call system/Call bell, personal items and telephone within reach/Instruct patient to call for assistance/Room bathroom lighting operational/Non-slip footwear when patient is off stretcher/Physically safe environment: no spills, clutter or unnecessary equipment/Provide visual cue, wrist band, yellow gown, etc./Monitor for mental status changes and reorient to person, place, and time

## 2021-05-25 NOTE — ED PROVIDER NOTE - NS ED ROS FT
ROS:  GENERAL: +gen weakness. No fever, no chills  EYES: no change in vision  HEENT: no trouble swallowing, no trouble speaking  CARDIAC: no chest pain  PULMONARY: no cough, no shortness of breath  GI: no abdominal pain, no nausea, no vomiting, no diarrhea, no constipation  : No dysuria, no frequency, no change in appearance, or odor of urine  SKIN: no rashes  NEURO: no headache, no focal weakness  MSK: No joint pain    Zaid Holman PGY3

## 2021-05-25 NOTE — ED PROVIDER NOTE - CLINICAL SUMMARY MEDICAL DECISION MAKING FREE TEXT BOX
70M with PMH including Afib ablation today p/w unstable V tach. Denies any symptoms aside from generalized weakness. No normotensive with nonischemic NSR on EKG s/p synchronized cardioversion (150j x1, 200j x 1). Will assess for causes of tachyarrhythmia including ACS, electrolyte abnormalities. Cards recs pending.

## 2021-05-25 NOTE — CHART NOTE - NSCHARTNOTEFT_GEN_A_CORE
ELECTROPHYSIOLOGY      Patient s/p atrial fibrillation ablation. Tolerated the procedure well. No complications.   Vital signs stable. Telemetry: NSR 80's.   Right groin without bleeding, ecchymosis, pain or hematoma. Dressing in place. Instructed to remove the dressing tomoroow.   Post procedure ablation teaching done. Written instructions and contact information provided.   Patient instructed to start Eliquis and Sotalol tonight.   He has a follow-up appointment with Dr. Maldonado on 6/24/21 at 10:30am  [address] [place] [address] [phone].

## 2021-05-25 NOTE — ED PROVIDER NOTE - PROGRESS NOTE DETAILS
Pt now normotensive with EKG sig for nonischemic NSR in the 70s. Cardiology aware of pt, coming to eval. Cardiology at bedside, requesting that patient be given 5 mg of Eliquis and be admitted to Dr. Mullen. They discussed with Dr. Maldonado. malika- discussed with cards and the patient was admitted to dr sánchez for further evaluation and treatment/management

## 2021-05-26 PROBLEM — I10 ESSENTIAL (PRIMARY) HYPERTENSION: Chronic | Status: ACTIVE | Noted: 2021-05-06

## 2021-05-26 LAB
ALBUMIN SERPL ELPH-MCNC: 3.6 G/DL — SIGNIFICANT CHANGE UP (ref 3.3–5)
ALP SERPL-CCNC: 34 U/L — LOW (ref 40–120)
ALT FLD-CCNC: 37 U/L — SIGNIFICANT CHANGE UP (ref 4–41)
ANION GAP SERPL CALC-SCNC: 15 MMOL/L — HIGH (ref 7–14)
ANION GAP SERPL CALC-SCNC: 19 MMOL/L — HIGH (ref 7–14)
AST SERPL-CCNC: 52 U/L — HIGH (ref 4–40)
BILIRUB SERPL-MCNC: 0.5 MG/DL — SIGNIFICANT CHANGE UP (ref 0.2–1.2)
BUN SERPL-MCNC: 22 MG/DL — SIGNIFICANT CHANGE UP (ref 7–23)
BUN SERPL-MCNC: 22 MG/DL — SIGNIFICANT CHANGE UP (ref 7–23)
CALCIUM SERPL-MCNC: 8.2 MG/DL — LOW (ref 8.4–10.5)
CALCIUM SERPL-MCNC: 9.1 MG/DL — SIGNIFICANT CHANGE UP (ref 8.4–10.5)
CHLORIDE SERPL-SCNC: 100 MMOL/L — SIGNIFICANT CHANGE UP (ref 98–107)
CHLORIDE SERPL-SCNC: 103 MMOL/L — SIGNIFICANT CHANGE UP (ref 98–107)
CO2 SERPL-SCNC: 18 MMOL/L — LOW (ref 22–31)
CO2 SERPL-SCNC: 20 MMOL/L — LOW (ref 22–31)
CREAT SERPL-MCNC: 1.26 MG/DL — SIGNIFICANT CHANGE UP (ref 0.5–1.3)
CREAT SERPL-MCNC: 1.29 MG/DL — SIGNIFICANT CHANGE UP (ref 0.5–1.3)
GLUCOSE SERPL-MCNC: 108 MG/DL — HIGH (ref 70–99)
GLUCOSE SERPL-MCNC: 151 MG/DL — HIGH (ref 70–99)
HCT VFR BLD CALC: 43.2 % — SIGNIFICANT CHANGE UP (ref 39–50)
HGB BLD-MCNC: 14.6 G/DL — SIGNIFICANT CHANGE UP (ref 13–17)
MAGNESIUM SERPL-MCNC: 2.4 MG/DL — SIGNIFICANT CHANGE UP (ref 1.6–2.6)
MCHC RBC-ENTMCNC: 31 PG — SIGNIFICANT CHANGE UP (ref 27–34)
MCHC RBC-ENTMCNC: 33.8 GM/DL — SIGNIFICANT CHANGE UP (ref 32–36)
MCV RBC AUTO: 91.7 FL — SIGNIFICANT CHANGE UP (ref 80–100)
NRBC # BLD: 0 /100 WBCS — SIGNIFICANT CHANGE UP
NRBC # FLD: 0 K/UL — SIGNIFICANT CHANGE UP
PHOSPHATE SERPL-MCNC: 4.1 MG/DL — SIGNIFICANT CHANGE UP (ref 2.5–4.5)
PLATELET # BLD AUTO: 212 K/UL — SIGNIFICANT CHANGE UP (ref 150–400)
POTASSIUM SERPL-MCNC: 3.7 MMOL/L — SIGNIFICANT CHANGE UP (ref 3.5–5.3)
POTASSIUM SERPL-MCNC: 4.4 MMOL/L — SIGNIFICANT CHANGE UP (ref 3.5–5.3)
POTASSIUM SERPL-SCNC: 3.7 MMOL/L — SIGNIFICANT CHANGE UP (ref 3.5–5.3)
POTASSIUM SERPL-SCNC: 4.4 MMOL/L — SIGNIFICANT CHANGE UP (ref 3.5–5.3)
PROT SERPL-MCNC: 5.4 G/DL — LOW (ref 6–8.3)
RBC # BLD: 4.71 M/UL — SIGNIFICANT CHANGE UP (ref 4.2–5.8)
RBC # FLD: 13.2 % — SIGNIFICANT CHANGE UP (ref 10.3–14.5)
SARS-COV-2 RNA SPEC QL NAA+PROBE: SIGNIFICANT CHANGE UP
SODIUM SERPL-SCNC: 137 MMOL/L — SIGNIFICANT CHANGE UP (ref 135–145)
SODIUM SERPL-SCNC: 138 MMOL/L — SIGNIFICANT CHANGE UP (ref 135–145)
WBC # BLD: 17.39 K/UL — HIGH (ref 3.8–10.5)
WBC # FLD AUTO: 17.39 K/UL — HIGH (ref 3.8–10.5)

## 2021-05-26 PROCEDURE — 99233 SBSQ HOSP IP/OBS HIGH 50: CPT

## 2021-05-26 RX ORDER — POTASSIUM CHLORIDE 20 MEQ
10 PACKET (EA) ORAL
Refills: 0 | Status: DISCONTINUED | OUTPATIENT
Start: 2021-05-26 | End: 2021-05-26

## 2021-05-26 RX ORDER — ASPIRIN/CALCIUM CARB/MAGNESIUM 324 MG
81 TABLET ORAL DAILY
Refills: 0 | Status: DISCONTINUED | OUTPATIENT
Start: 2021-05-26 | End: 2021-05-26

## 2021-05-26 RX ORDER — SOTALOL HCL 120 MG
120 TABLET ORAL
Refills: 0 | Status: DISCONTINUED | OUTPATIENT
Start: 2021-05-26 | End: 2021-05-27

## 2021-05-26 RX ORDER — LISINOPRIL 2.5 MG/1
10 TABLET ORAL DAILY
Refills: 0 | Status: DISCONTINUED | OUTPATIENT
Start: 2021-05-26 | End: 2021-05-28

## 2021-05-26 RX ORDER — APIXABAN 2.5 MG/1
5 TABLET, FILM COATED ORAL
Refills: 0 | Status: DISCONTINUED | OUTPATIENT
Start: 2021-05-26 | End: 2021-05-28

## 2021-05-26 RX ORDER — PANTOPRAZOLE SODIUM 20 MG/1
40 TABLET, DELAYED RELEASE ORAL
Refills: 0 | Status: DISCONTINUED | OUTPATIENT
Start: 2021-05-26 | End: 2021-05-28

## 2021-05-26 RX ORDER — ATORVASTATIN CALCIUM 80 MG/1
10 TABLET, FILM COATED ORAL AT BEDTIME
Refills: 0 | Status: DISCONTINUED | OUTPATIENT
Start: 2021-05-26 | End: 2021-05-28

## 2021-05-26 RX ORDER — POTASSIUM CHLORIDE 20 MEQ
40 PACKET (EA) ORAL ONCE
Refills: 0 | Status: COMPLETED | OUTPATIENT
Start: 2021-05-26 | End: 2021-05-26

## 2021-05-26 RX ORDER — ASPIRIN/CALCIUM CARB/MAGNESIUM 324 MG
81 TABLET ORAL DAILY
Refills: 0 | Status: DISCONTINUED | OUTPATIENT
Start: 2021-05-26 | End: 2021-05-28

## 2021-05-26 RX ORDER — MAGNESIUM SULFATE 500 MG/ML
2 VIAL (ML) INJECTION ONCE
Refills: 0 | Status: COMPLETED | OUTPATIENT
Start: 2021-05-26 | End: 2021-05-26

## 2021-05-26 RX ORDER — CHLORHEXIDINE GLUCONATE 213 G/1000ML
1 SOLUTION TOPICAL
Refills: 0 | Status: DISCONTINUED | OUTPATIENT
Start: 2021-05-26 | End: 2021-05-28

## 2021-05-26 RX ORDER — MEXILETINE HYDROCHLORIDE 150 MG/1
150 CAPSULE ORAL EVERY 12 HOURS
Refills: 0 | Status: DISCONTINUED | OUTPATIENT
Start: 2021-05-26 | End: 2021-05-26

## 2021-05-26 RX ORDER — CALCIUM GLUCONATE 100 MG/ML
2 VIAL (ML) INTRAVENOUS ONCE
Refills: 0 | Status: COMPLETED | OUTPATIENT
Start: 2021-05-26 | End: 2021-05-26

## 2021-05-26 RX ORDER — APIXABAN 2.5 MG/1
2.5 TABLET, FILM COATED ORAL
Refills: 0 | Status: DISCONTINUED | OUTPATIENT
Start: 2021-05-26 | End: 2021-05-26

## 2021-05-26 RX ORDER — MEXILETINE HYDROCHLORIDE 150 MG/1
150 CAPSULE ORAL ONCE
Refills: 0 | Status: COMPLETED | OUTPATIENT
Start: 2021-05-26 | End: 2021-05-26

## 2021-05-26 RX ADMIN — Medication 40 MILLIEQUIVALENT(S): at 01:48

## 2021-05-26 RX ADMIN — Medication 50 GRAM(S): at 00:26

## 2021-05-26 RX ADMIN — Medication 100 MILLIEQUIVALENT(S): at 00:37

## 2021-05-26 RX ADMIN — APIXABAN 5 MILLIGRAM(S): 2.5 TABLET, FILM COATED ORAL at 09:20

## 2021-05-26 RX ADMIN — PANTOPRAZOLE SODIUM 40 MILLIGRAM(S): 20 TABLET, DELAYED RELEASE ORAL at 05:36

## 2021-05-26 RX ADMIN — Medication 200 GRAM(S): at 01:48

## 2021-05-26 RX ADMIN — MEXILETINE HYDROCHLORIDE 150 MILLIGRAM(S): 150 CAPSULE ORAL at 01:48

## 2021-05-26 RX ADMIN — CHLORHEXIDINE GLUCONATE 1 APPLICATION(S): 213 SOLUTION TOPICAL at 09:25

## 2021-05-26 RX ADMIN — APIXABAN 5 MILLIGRAM(S): 2.5 TABLET, FILM COATED ORAL at 22:39

## 2021-05-26 RX ADMIN — Medication 120 MILLIGRAM(S): at 11:08

## 2021-05-26 NOTE — PROGRESS NOTE ADULT - ASSESSMENT
Assessment and Recommendation:   · Assessment	  70M pmhx of NICM s/p single lead [employer] AICD, HTN, s/p ablation for afib/AT (5/25/21) present with sudden on set of weakness and diaphoresis X 1hour after the discharge from [facility] today. Found to be in VT and shock X2 and subsequently converted to SR. CCU consult called for symptomatic frequent VT while in ED.       PLAN  Neuro  -no acute issues    Resp  -no acute issues    CV  #VT - s/p 120J X1, 200J x1, etomidate 10mg ivp, 1 liter NS bolus, s/p shock -->/56 (69), HR 62, RR 20. patient denies CP or any discomfort. while in ED, patient had few more episodes of symptomatic VT which proceeded to RRT. CCU consult requested.  -admit to CCU for closer observation.  -give sotalol 120mg po X1 now (given in ED), and continue 120mg PO BID as per Dr. Maldonado.  -mexiletine 150mg po BID initiated in CCU as per Dr. Maldonado.   -c/w Eliquis 5mg po BID  -consider EP to interrogate AICD in am  -keep K>4, mg >2     #NICM  -c/w ASA 81mg po daily  -c/w lipitor 10mg po every other day- last taken on 5/24/21    #HTN  -c/w lisinopril 10mg po daily    GI  #PPX s/p ablation  -Pantoprazole 40 mg po daily (for 1 month)      -no acute issues    Case discussed with Dr. Maldonado  Attending attestation to follow.     70M pmhx of NICM c/b cardiac arrest (2021) s/p single lead [employer] AICD, HTN, s/p ablation for afib/AT (5/25/21) present with sudden on set of weakness and diaphoresis X 1hour after the discharge from [facility] today. Found to be in VT and shock X2 and subsequently converted to SR. CCU consult called for symptomatic frequent VT while in ED.       PLAN  Neuro  -no acute issues    Resp  -no acute issues    CV  #VT - s/p 120J X1, 200J x1, etomidate 10mg ivp, 1 liter NS bolus, s/p shock -->/56 (69), HR 62, RR 20. patient denies CP or any discomfort. while in ED, patient had few more episodes of symptomatic VT which proceeded to RRT. CCU consult requested.  -admit to CCU for closer observation.  -s/p sotalol 120mg po X1(given in ED)  -continue 120mg PO BID as per Dr. Maldonado.  -mexiletine 150mg po BID as per Dr. Maldonado.   -c/w Eliquis 5mg po BID  -EP interrogated AICD - rapid afib vs atach vs vtach  -appreciate EP recs  -keep K>4, mg >2     #NICM  -c/w ASA 81mg po daily  -c/w lipitor 10mg po every other day- last taken on 5/24/21    #HTN  -c/w lisinopril 10mg po daily    GI  #PPX s/p ablation  -Pantoprazole 40 mg po daily (for 1 month)      -no acute issues    Heme  -no acute issues    ID  -leukocytosis - possibly i/s/o stress - trend    F: none needed  E: K>4, Mg >2  N: DASH  D: eliquis

## 2021-05-26 NOTE — H&P ADULT - HISTORY OF PRESENT ILLNESS
CC:  Patient is a 70y old  Male who presents with a chief complaint of weakness and diaphoresis X 1 hour    HPI:  70M pmhx of NICM s/p single lead [employer] AICD, HTN, present with sudden on set of weakness and diaphoresis X 1hour after the discharge from [facility] today. Earlier today, he received complex ablation for afib/AT which was successful (successful isolation of pulm vein in antral circumferential approach, posterial wall isolation and CTI ablation with bidirectional block). He was discharged and while in the car, he felt weak and diaphoresis, decided to come back to hospital. Patient denies CP, SOB, n/v/d, dysuria, fever or chills.     Of note, 3/4. Pt had AICD interrogated in the ED, demonstrated episodes of SVT  (likely a fib, a flutter) with inappropriate therapy    In Ed, he was found to be in VT, received etomidate 10mg IVP X1, 120j shock X1, then 200j shock X1 which converted his rhythm to NSR HR 60's. NS 1 liter x1, Sotalol 120mg po X1, Eliquis 5mg po X1. /56 (69), HR 62, RR 20.     CCU consult called for patient having symptomatic frequent Vtach on tele monitor which self resolved. Case discussed with Dr. Maldonado and Mexiletine 150mg po BID initiated and admitted to CCU for closer monitoring.

## 2021-05-26 NOTE — H&P ADULT - ASSESSMENT
Assessment and Recommendation:   · Assessment	  70M pmhx of NICM s/p single lead [employer] AICD, HTN, s/p ablation for afib/AT (5/25/21) present with sudden on set of weakness and diaphoresis X 1hour after the discharge from [facility] today. Found to be in VT and shock X2 and subsequently converted to SR. CCU consult called for symptomatic frequent VT while in ED.       PLAN  Neuro  -no acute issues    Resp  -no acute issues    CV  #VT - s/p 120J X1, 200J x1, etomidate 10mg ivp, 1 liter NS bolus, s/p shock -->/56 (70), HR 62, RR 20. patient denies CP or any discomfort. while in ED, patient had few more episodes of symptomatic VT which proceeded to RRT. CCU consult requested.  -admit to CCU for closer observation.  -give sotalol 120mg po X1 now (given in ED), and continue 120mg PO BID as per Dr. Maldonado.  -mexiletine 150mg po BID initiated in CCU as per Dr. Maldonado.   -c/w Eliquis 5mg po BID  -pantoprazole 40mg po daily  -keep K>4, mg >2     #NICM  -c/w ASA 81mg po daily  -c/w lipitor 10mg po every other day- last taken on 5/24/21    #HTN  -c/w lisinopril 10mg po daily    GI  #PPX s/p ablation  -Pantoprazole 40 mg po daily (for 1 month)      -no acute issues    Case discussed with Dr. Maldonado  Attending attestation to follow.     Assessment and Recommendation:   · Assessment	  70M pmhx of NICM s/p single lead [employer] AICD, HTN, s/p ablation for afib/AT (5/25/21) present with sudden on set of weakness and diaphoresis X 1hour after the discharge from [facility] today. Found to be in VT and shock X2 and subsequently converted to SR. CCU consult called for symptomatic frequent VT while in ED.       PLAN  Neuro  -no acute issues    Resp  -no acute issues    CV  #VT - s/p 120J X1, 200J x1, etomidate 10mg ivp, 1 liter NS bolus, s/p shock -->/56 (70), HR 62, RR 20. patient denies CP or any discomfort. while in ED, patient had few more episodes of symptomatic VT which proceeded to RRT. CCU consult requested.  -admit to CCU for closer observation.  -give sotalol 120mg po X1 now (given in ED), and continue 120mg PO BID as per Dr. Maldonado.  -mexiletine 150mg po BID initiated in CCU as per Dr. Maldonado.   -c/w Eliquis 5mg po BID  -consider EP to interrogate AICD in am  -keep K>4, mg >2     #NICM  -c/w ASA 81mg po daily  -c/w lipitor 10mg po every other day- last taken on 5/24/21    #HTN  -c/w lisinopril 10mg po daily    GI  #PPX s/p ablation  -Pantoprazole 40 mg po daily (for 1 month)      -no acute issues    Case discussed with Dr. Maldonado  Attending attestation to follow.

## 2021-05-26 NOTE — PROGRESS NOTE ADULT - SUBJECTIVE AND OBJECTIVE BOX
PATIENT:  LATOSHA SEGOVIA  [id number]    CHIEF COMPLAINT:  Patient is a 70y old  Male who presents with a chief complaint of weak and diaphoresis (26 May 2021 02:23)      INTERVAL HISTORY/OVERNIGHT EVENTS:  -episode of tachycardia - atrial vs ventricular tach     REVIEW OF SYSTEMS:    Constitutional:     [ ] negative [ ] fevers [ ] chills [ ] weight loss [ ] weight gain  HEENT:                  [ ] negative [ ] dry eyes [ ] eye irritation [ ] postnasal drip [ ] nasal congestion  CV:                         [ ] negative  [ ] chest pain [ ] orthopnea [ ] palpitations [ ] murmur  Resp:                     [ ] negative [ ] cough [ ] shortness of breath [ ] dyspnea [ ] wheezing [ ] sputum [ ] hemoptysis  GI:                          [ ] negative [ ] nausea [ ] vomiting [ ] diarrhea [ ] constipation [ ] abd pain [ ] dysphagia   :                        [ ] negative [ ] dysuria [ ] nocturia [ ] hematuria [ ] increased urinary frequency  Musculoskeletal: [ ] negative [ ] back pain [ ] myalgias [ ] arthralgias [ ] fracture  Skin:                       [ ] negative [ ] rash [ ] itch  Neurological:        [ ] negative [ ] headache [ ] dizziness [ ] syncope [ ] weakness [ ] numbness  Psychiatric:           [ ] negative [ ] anxiety [ ] depression  Endocrine:            [ ] negative [ ] diabetes [ ] thyroid problem  Heme/Lymph:      [ ] negative [ ] anemia [ ] bleeding problem  Allergic/Immune: [ ] negative [ ] itchy eyes [ ] nasal discharge [ ] hives [ ] angioedema    [ ] All other systems negative  [ ] Unable to assess ROS because ________.    MEDICATIONS:  MEDICATIONS  (STANDING):  apixaban 5 milliGRAM(s) Oral two times a day  aspirin  chewable 81 milliGRAM(s) Oral daily  atorvastatin 10 milliGRAM(s) Oral at bedtime  chlorhexidine 4% Liquid 1 Application(s) Topical <User Schedule>  lisinopril 10 milliGRAM(s) Oral daily  mexiletine 150 milliGRAM(s) Oral every 12 hours  pantoprazole    Tablet 40 milliGRAM(s) Oral before breakfast  sotalol 120 milliGRAM(s) Oral two times a day    MEDICATIONS  (PRN):      ALLERGIES:  Allergies    No Known Allergies    Intolerances        OBJECTIVE:  ICU Vital Signs Last 24 Hrs  T(C): 36.8 (26 May 2021 08:00), Max: 36.9 (25 May 2021 22:20)  T(F): 98.2 (26 May 2021 08:00), Max: 98.4 (25 May 2021 22:20)  HR: 60 (26 May 2021 10:00) (57 - 175)  BP: 97/53 (26 May 2021 10:00) (76/58 - 174/159)  BP(mean): 64 (26 May 2021 10:00) (62 - 162)  ABP: --  ABP(mean): --  RR: 20 (26 May 2021 10:00) (12 - 28)  SpO2: 96% (26 May 2021 10:00) (92% - 100%)      Adult Advanced Hemodynamics Last 24 Hrs  CVP(mm Hg): --  CVP(cm H2O): --  CO: --  CI: --  PA: --  PA(mean): --  PCWP: --  SVR: --  SVRI: --  PVR: --  PVRI: --  CAPILLARY BLOOD GLUCOSE        CAPILLARY BLOOD GLUCOSE        I&O's Summary    25 May 2021 07:01  -  26 May 2021 07:00  --------------------------------------------------------  IN: 580 mL / OUT: 300 mL / NET: 280 mL    26 May 2021 07:01  -  26 May 2021 11:12  --------------------------------------------------------  IN: 120 mL / OUT: 0 mL / NET: 120 mL      Daily Height in cm: 180.34 (25 May 2021 21:39)    Daily     PHYSICAL EXAMINATION:  General: WN/WD NAD  HEENT: PERRLA, EOMI, moist mucous membranes  Neurology: A&Ox3, nonfocal, JONES x 4  Respiratory: CTA B/L, normal respiratory effort, no wheezes, crackles, rales  CV: RRR, S1S2, no murmurs, rubs or gallops  Abdominal: Soft, NT, ND +BS, Last BM  Extremities: No edema, + peripheral pulses  Incisions:   Tubes:    LABS:                          14.6   17.39 )-----------( 212      ( 26 May 2021 07:10 )             43.2     05-26    138  |  103  |  22  ----------------------------<  108<H>  4.4   |  20<L>  |  1.29    Ca    9.1      26 May 2021 07:10  Phos  4.1     05-26  Mg     2.4     05-26    TPro  5.4<L>  /  Alb  3.6  /  TBili  0.5  /  DBili  x   /  AST  52<H>  /  ALT  37  /  AlkPhos  34<L>  05-26    LIVER FUNCTIONS - ( 26 May 2021 07:10 )  Alb: 3.6 g/dL / Pro: 5.4 g/dL / ALK PHOS: 34 U/L / ALT: 37 U/L / AST: 52 U/L / GGT: x           PT/INR - ( 25 May 2021 22:53 )   PT: 13.6 sec;   INR: 1.20 ratio         PTT - ( 25 May 2021 22:53 )  PTT:29.5 sec            TELEMETRY:     EKG:     IMAGING:           PATIENT:  LATOSHA SEGOVIA  [id number]    CHIEF COMPLAINT:  Patient is a 70y old  Male who presents with a chief complaint of weak and diaphoresis (26 May 2021 02:23)      INTERVAL HISTORY/OVERNIGHT EVENTS:  -episode of tachycardia - atrial vs ventricular tach which was aborted with cold packs    REVIEW OF SYSTEMS:    Constitutional:     [ ] negative [ ] fevers [ ] chills [ ] weight loss [ ] weight gain  HEENT:                  [ ] negative [ ] dry eyes [ ] eye irritation [ ] postnasal drip [ ] nasal congestion  CV:                         [ ] negative  [ ] chest pain [ ] orthopnea [ ] palpitations [ ] murmur  Resp:                     [ ] negative [ ] cough [ ] shortness of breath [ ] dyspnea [ ] wheezing [ ] sputum [ ] hemoptysis  GI:                          [ ] negative [ ] nausea [ ] vomiting [ ] diarrhea [ ] constipation [ ] abd pain [ ] dysphagia   :                        [ ] negative [ ] dysuria [ ] nocturia [ ] hematuria [ ] increased urinary frequency  Musculoskeletal: [ ] negative [ ] back pain [ ] myalgias [ ] arthralgias [ ] fracture  Skin:                       [ ] negative [ ] rash [ ] itch  Neurological:        [ ] negative [ ] headache [ ] dizziness [ ] syncope [ ] weakness [ ] numbness  Psychiatric:           [ ] negative [ ] anxiety [ ] depression  Endocrine:            [ ] negative [ ] diabetes [ ] thyroid problem  Heme/Lymph:      [ ] negative [ ] anemia [ ] bleeding problem  Allergic/Immune: [ ] negative [ ] itchy eyes [ ] nasal discharge [ ] hives [ ] angioedema    [ ] All other systems negative  [ ] Unable to assess ROS because ________.    MEDICATIONS:  MEDICATIONS  (STANDING):  apixaban 5 milliGRAM(s) Oral two times a day  aspirin  chewable 81 milliGRAM(s) Oral daily  atorvastatin 10 milliGRAM(s) Oral at bedtime  chlorhexidine 4% Liquid 1 Application(s) Topical <User Schedule>  lisinopril 10 milliGRAM(s) Oral daily  mexiletine 150 milliGRAM(s) Oral every 12 hours  pantoprazole    Tablet 40 milliGRAM(s) Oral before breakfast  sotalol 120 milliGRAM(s) Oral two times a day    MEDICATIONS  (PRN):      ALLERGIES:  Allergies    No Known Allergies    Intolerances        OBJECTIVE:  ICU Vital Signs Last 24 Hrs  T(C): 36.8 (26 May 2021 08:00), Max: 36.9 (25 May 2021 22:20)  T(F): 98.2 (26 May 2021 08:00), Max: 98.4 (25 May 2021 22:20)  HR: 60 (26 May 2021 10:00) (57 - 175)  BP: 97/53 (26 May 2021 10:00) (76/58 - 174/159)  BP(mean): 64 (26 May 2021 10:00) (62 - 162)  ABP: --  ABP(mean): --  RR: 20 (26 May 2021 10:00) (12 - 28)  SpO2: 96% (26 May 2021 10:00) (92% - 100%)      --------------------------------------------------------  IN: 120 mL / OUT: 0 mL / NET: 120 mL      Daily Height in cm: 180.34 (25 May 2021 21:39)    Daily     PHYSICAL EXAMINATION:  General: WN/WD NAD  HEENT: PERRLA, EOMI, moist mucous membranes  Neurology: A&Ox3, nonfocal, JONES x 4  Respiratory: CTA B/L, normal respiratory effort, no wheezes, crackles, rales, + L chest wall AICD  CV: RRR, S1S2, no murmurs, rubs or gallops  Abdominal: Soft, NT, ND +BS, Last BM  Extremities: No edema, + peripheral pulses  Incisions:   Tubes:    LABS:                          14.6   17.39 )-----------( 212      ( 26 May 2021 07:10 )             43.2     05-26    138  |  103  |  22  ----------------------------<  108<H>  4.4   |  20<L>  |  1.29    Ca    9.1      26 May 2021 07:10  Phos  4.1     05-26  Mg     2.4     05-26    TPro  5.4<L>  /  Alb  3.6  /  TBili  0.5  /  DBili  x   /  AST  52<H>  /  ALT  37  /  AlkPhos  34<L>  05-26    LIVER FUNCTIONS - ( 26 May 2021 07:10 )  Alb: 3.6 g/dL / Pro: 5.4 g/dL / ALK PHOS: 34 U/L / ALT: 37 U/L / AST: 52 U/L / GGT: x           PT/INR - ( 25 May 2021 22:53 )   PT: 13.6 sec;   INR: 1.20 ratio         PTT - ( 25 May 2021 22:53 )  PTT:29.5 sec            TELEMETRY: episode of tachycardia from 3:18-3:22 AM - rate of 170s, slightly irregular    EKG: NSR with ectopy    IMAGING:

## 2021-05-26 NOTE — ED ADULT NURSE REASSESSMENT NOTE - NS ED NURSE REASSESS COMMENT FT1
pt in and out of SVT and intermittent hypotension. Pt awake and alert throughout and SVT breaking spontaneously. CORI Hernandez at bedside. rapid response called. pt transferred to CCU for higher level of care. report given to PRISCILLA Patiño, pt transported to CCU 7 without further incident.

## 2021-05-26 NOTE — PROGRESS NOTE ADULT - ASSESSMENT
70M Hx of NICM s/p single lead [employer] AICD, HTN, s/p ablation for AF/AT (5/25/21) present with sudden on set of weakness and diaphoresis X 1hour after the discharge from [facility] today, found to be in both narrow complex tachycardia and WCT.    #arrhythmias  -multifocal PVCs, WCT most consistent with RVOT VT, and numerous episodes of suspected atrial arrhythmia on device interrogation  -suspect 2/2 decrease sotalol dosing / held for ablation on 5/25  -restart sotalol 120mg PO BID  -d/c mexiletine for now  -monitor for arrhythmic burden    Zaid Michael MD  Cardiology Fellow - PGY 5  Text or Call: [phone]  For all New Consults and Questions:  [email or web address]   Login: [email or web address]

## 2021-05-26 NOTE — PROGRESS NOTE ADULT - SUBJECTIVE AND OBJECTIVE BOX
Name of Patient : LATOSHA SEGOVIA  MRN: [id number]  DATE OF SERVICE: 05-26-21     Subjective: Patient seen and examined. No new events except as noted.   doing okay     REVIEW OF SYSTEMS:    CONSTITUTIONAL: No weakness, fevers or chills  EYES/ENT: No visual changes;  No vertigo or throat pain   NECK: No pain or stiffness  RESPIRATORY: No cough, wheezing, hemoptysis; No shortness of breath  CARDIOVASCULAR: No chest pain or palpitations  GASTROINTESTINAL: No abdominal or epigastric pain. No nausea, vomiting, or hematemesis; No diarrhea or constipation. No melena or hematochezia.  GENITOURINARY: No dysuria, frequency or hematuria  NEUROLOGICAL: No numbness or weakness  SKIN: No itching, burning, rashes, or lesions   All other review of systems is negative unless indicated above.    MEDICATIONS:  MEDICATIONS  (STANDING):  apixaban 5 milliGRAM(s) Oral two times a day  aspirin  chewable 81 milliGRAM(s) Oral daily  atorvastatin 10 milliGRAM(s) Oral at bedtime  chlorhexidine 4% Liquid 1 Application(s) Topical <User Schedule>  lisinopril 10 milliGRAM(s) Oral daily  pantoprazole    Tablet 40 milliGRAM(s) Oral before breakfast  sotalol 120 milliGRAM(s) Oral two times a day      PHYSICAL EXAM:  T(C): 37.2 (05-26-21 @ 12:00), Max: 37.2 (05-26-21 @ 12:00)  HR: 56 (05-26-21 @ 15:00) (52 - 175)  BP: 110/57 (05-26-21 @ 15:00) (76/58 - 174/159)  RR: 20 (05-26-21 @ 15:00) (12 - 28)  SpO2: 98% (05-26-21 @ 15:00) (92% - 100%)  Wt(kg): --  I&O's Summary    25 May 2021 07:01  -  26 May 2021 07:00  --------------------------------------------------------  IN: 580 mL / OUT: 300 mL / NET: 280 mL    26 May 2021 07:01  -  26 May 2021 15:33  --------------------------------------------------------  IN: 320 mL / OUT: 280 mL / NET: 40 mL      Height (cm): 180.3 (05-25 @ 21:39)  Weight (kg): 68.6 (05-26 @ 02:04)  BMI (kg/m2): 21.1 (05-26 @ 02:04)  BSA (m2): 1.87 (05-26 @ 02:04)    Appearance: Normal	  HEENT:  PERRLA   Lymphatic: No lymphadenopathy   Cardiovascular: Normal S1 S2, no JVD  Respiratory: normal effort , clear  Gastrointestinal:  Soft, Non-tender  Skin: No rashes,  warm to touch  Psychiatry:  Mood & affect appropriate  Musculuskeletal: No edema      All labs, Imaging and EKGs personally reviewed       05-25-21 @ 07:01  -  05-26-21 @ 07:00  --------------------------------------------------------  IN: 580 mL / OUT: 300 mL / NET: 280 mL    05-26-21 @ 07:01  -  05-26-21 @ 15:33  --------------------------------------------------------  IN: 320 mL / OUT: 280 mL / NET: 40 mL                          14.6   17.39 )-----------( 212      ( 26 May 2021 07:10 )             43.2               05-26    138  |  103  |  22  ----------------------------<  108<H>  4.4   |  20<L>  |  1.29    Ca    9.1      26 May 2021 07:10  Phos  4.1     05-26  Mg     2.4     05-26    TPro  5.4<L>  /  Alb  3.6  /  TBili  0.5  /  DBili  x   /  AST  52<H>  /  ALT  37  /  AlkPhos  34<L>  05-26    PT/INR - ( 25 May 2021 22:53 )   PT: 13.6 sec;   INR: 1.20 ratio         PTT - ( 25 May 2021 22:53 )  PTT:29.5 sec

## 2021-05-26 NOTE — ED ADULT NURSE REASSESSMENT NOTE - NS ED NURSE REASSESS COMMENT FT1
pt awake and alert went back into vtach with a pulse BP 80s/60s. pt self converted to NSR after ~2 minutes, as MD about to medicate for cardioversion. VSS. rpt EKG in progress, meds administered per EMAR. see flowsheet for VS. pt awake and alert  a pulse BP 80s/60s. pt self converted to NSR after ~2 minutes, as MD about to medicate for cardioversion. VSS. rpt EKG in progress, meds administered per EMAR. see flowsheet for VS.

## 2021-05-26 NOTE — PROGRESS NOTE ADULT - SUBJECTIVE AND OBJECTIVE BOX
Patient seen and examined at bedside.    Overnight Events:  no device shocks last night.  feels well.    Review Of Systems: No chest pain, shortness of breath, or palpitations            Current Meds:  apixaban 5 milliGRAM(s) Oral two times a day  aspirin  chewable 81 milliGRAM(s) Oral daily  atorvastatin 10 milliGRAM(s) Oral at bedtime  chlorhexidine 4% Liquid 1 Application(s) Topical <User Schedule>  lisinopril 10 milliGRAM(s) Oral daily  mexiletine 150 milliGRAM(s) Oral every 12 hours  pantoprazole    Tablet 40 milliGRAM(s) Oral before breakfast  sotalol 120 milliGRAM(s) Oral two times a day      Vitals:  T(F): 98.2 (05-26), Max: 98.4 (05-25)  HR: 60 (05-26) (57 - 175)  BP: 97/53 (05-26) (76/58 - 174/159)  RR: 20 (05-26)  SpO2: 96% (05-26)  I&O's Summary    25 May 2021 07:01  -  26 May 2021 07:00  --------------------------------------------------------  IN: 580 mL / OUT: 300 mL / NET: 280 mL    26 May 2021 07:01  -  26 May 2021 11:38  --------------------------------------------------------  IN: 120 mL / OUT: 0 mL / NET: 120 mL        Physical Exam:  Appearance: No acute distress; well appearing  Eyes: PERRL, EOMI, pink conjunctiva  HEENT: Normal oral mucosa  Cardiovascular: RRR, S1, S2, no murmurs, rubs, or gallops; no edema; no JVD  Respiratory: Clear to auscultation bilaterally  Gastrointestinal: soft, non-tender, non-distended with normal bowel sounds  Musculoskeletal: No clubbing; no joint deformity   Neurologic: Non-focal  Lymphatic: No lymphadenopathy  Psychiatry: AAOx3, mood & affect appropriate  Right femoral vein access site:  c/d/i, no ecchymosis bandage with dried blood  Skin: No rashes, ecchymoses, or cyanosis                          14.6   17.39 )-----------( 212      ( 26 May 2021 07:10 )             43.2     05-26    138  |  103  |  22  ----------------------------<  108<H>  4.4   |  20<L>  |  1.29    Ca    9.1      26 May 2021 07:10  Phos  4.1     05-26  Mg     2.4     05-26    TPro  5.4<L>  /  Alb  3.6  /  TBili  0.5  /  DBili  x   /  AST  52<H>  /  ALT  37  /  AlkPhos  34<L>  05-26    PT/INR - ( 25 May 2021 22:53 )   PT: 13.6 sec;   INR: 1.20 ratio         PTT - ( 25 May 2021 22:53 )  PTT:29.5 sec  CARDIAC MARKERS ( 25 May 2021 22:35 )  1149 ng/L / x     / x     / x     / x     / x          Serum Pro-Brain Natriuretic Peptide: 630 pg/mL (05-25 @ 22:35)    Interpretation of Telemetry:  PVCs of different morphology, 1x run of apparent VT

## 2021-05-26 NOTE — RAPID RESPONSE TEAM SUMMARY - NSSITUATIONBACKGROUNDRRT_GEN_ALL_CORE
70M pmhx of NICM s/p single lead [employer] AICD, HTN, s/p ablation for afib/AT (5/25/21) present with sudden on set of weakness and diaphoresis X 1hour after the discharge from [facility] today.      Found in VT vs. SVTs in ED; hypotensive. Initially responded to 1 round of cardioversion and sotalol prior to rapid. Rapid called for persistent SVTs c/b HD instability. Patient in and out of sustained SVTs on minimal exertion. Talking and moving around precipitated SVTs. Patient was being prepared for shock when he spontaneously broke sustained SVTs after 2 minutes by resting and with cold packs. Patient initially accepted to medicine but on further cardiology consultation, patient accepted to CCU.

## 2021-05-26 NOTE — PROGRESS NOTE ADULT - ASSESSMENT
Patient is a 71 Y/O Male pmhx of NICM s/p single lead [employer] AICD, HTN, s/p ablation for afib/AT (5/25/21) present with sudden on set of weakness and diaphoresis X 1hour after the discharge from [facility] today. Found to be in VT and shock X2 and subsequently converted to SR. CCU consult called for symptomatic frequent VT while in ED.     #VT - s/p 120J X1, 200J x1, etomidate 10mg ivp, 1 liter NS bolus, s/p shock -->/56 (69), HR 62, RR 20. patient denies CP or any discomfort. while in ED, patient had few more episodes of symptomatic VT which proceeded to RRT. CCU consult requested.  -admit to CCU for closer observation.  -give sotalol 120mg po X1 now (given in ED), and continue 120mg PO BID as per Dr. Maldonado.  -mexiletine 150mg po BID initiated in CCU as per Dr. Maldonado.   -c/w Eliquis 5mg po BID  -consider EP to interrogate AICD in am  -keep K>4, mg >2     #NICM  -c/w ASA 81mg po daily  -c/w lipitor 10mg po every other day- last taken on 5/24/21    #HTN  -c/w lisinopril 10mg po daily    #PPX s/p ablation  -Pantoprazole 40 mg po daily (for 1 month)      DVT and GI PPX

## 2021-05-26 NOTE — H&P ADULT - NSHPLABSRESULTS_GEN_ALL_CORE
LABS:                          12.9   13.29 )-----------( 181      ( 25 May 2021 22:35 )             40.6     05-26    137  |  100  |  22  ----------------------------<  151<H>  3.7   |  18<L>  |  1.26    Ca    8.2<L>      26 May 2021 00:21  Phos  3.0     05-25  Mg     2.0     05-26    TPro  2.4<L>  /  Alb  1.5<L>  /  TBili  0.3  /  DBili  x   /  AST  50<H>  /  ALT  20  /  AlkPhos  <10<L>  05-25    LIVER FUNCTIONS - ( 25 May 2021 22:35 )  Alb: 1.5 g/dL / Pro: 2.4 g/dL / ALK PHOS: <10 U/L / ALT: 20 U/L / AST: 50 U/L / GGT: x           PT/INR - ( 25 May 2021 22:53 )   PT: 13.6 sec;   INR: 1.20 ratio         PTT - ( 25 May 2021 22:53 )  PTT:29.5 sec    tt< from: Transthoracic Echocardiogram (03.05.21 @ 14:30) >    Ejection Fraction (Teicholtz): 59 %    CONCLUSIONS:  1. Bileaflet mitral valve prolapse. Mild-moderate mitral  regurgitation.  2. Calcified trileaflet aortic valve with normal opening.  Mild aortic regurgitation.  3. Mildly dilated left atrium.  LA volume index = 38 cc/m2.  4. Normal left ventricular internal dimensions and wall  thicknesses.  5. Normal left ventricular systolic function. No segmental  wall motion abnormalities.  6. Mild diastolic dysfunction (Stage I).  7. Normal right ventricular size and function.  A device  wire is noted in the right heart.  ------------------------------------------------------------------------  Confirmed on  3/5/2021 - 16:12:22 by Donald De La Rosa M.D.,  [facility], [place]  ------------------------------------------------------------------------    < end of copied text >

## 2021-05-26 NOTE — PATIENT PROFILE ADULT - VISION (WITH CORRECTIVE LENSES IF THE PATIENT USUALLY WEARS THEM):
----- Message from Angi Cortes MD sent at 9/18/2020  8:30 AM EDT -----  Patient's Holter was reviewed  She has elevated average heart rate with episodes of sinus tachycardia  No other significant tachy-maranda arrhythmias  Please call patient 
I left a message for return call regarding holter monitor 
Normal vision: sees adequately in most situations; can see medication labels, newsprint

## 2021-05-26 NOTE — H&P ADULT - NSHPPHYSICALEXAM_GEN_ALL_CORE
LOS: 1d    VITALS:   T(C): 36.9 (05-26-21 @ 02:00), Max: 36.9 (05-25-21 @ 22:20)  HR: 72 (05-26-21 @ 02:00) (57 - 174)  BP: 120/55 (05-26-21 @ 02:00) (76/58 - 174/159)  RR: 18 (05-26-21 @ 02:00) (14 - 23)  SpO2: 95% (05-26-21 @ 02:00) (93% - 100%)    GENERAL: NAD, lying in bed comfortably  HEAD:  Atraumatic, Normocephalic  EYES: EOMI, PERRLA, conjunctiva and sclera clear  ENT: Moist mucous membranes  NECK: Supple, No JVD  CHEST/LUNG: Clear to auscultation bilaterally; No rales, rhonchi, wheezing, or rubs. Unlabored respirations  HEART: Regular rate and rhythm; No murmurs, rubs, or gallops  ABDOMEN: BSx4; Soft, nontender, nondistended  EXTREMITIES:  2+ Peripheral Pulses, brisk capillary refill. No clubbing, cyanosis, or edema  NERVOUS SYSTEM:  A&Ox3, no focal deficits   SKIN: No rashes or lesions

## 2021-05-27 ENCOUNTER — TRANSCRIPTION ENCOUNTER (OUTPATIENT)
Age: 71
End: 2021-05-27

## 2021-05-27 LAB
ANION GAP SERPL CALC-SCNC: 10 MMOL/L — SIGNIFICANT CHANGE UP (ref 7–14)
ANION GAP SERPL CALC-SCNC: 11 MMOL/L — SIGNIFICANT CHANGE UP (ref 7–14)
BUN SERPL-MCNC: 16 MG/DL — SIGNIFICANT CHANGE UP (ref 7–23)
BUN SERPL-MCNC: 20 MG/DL — SIGNIFICANT CHANGE UP (ref 7–23)
CALCIUM SERPL-MCNC: 8.9 MG/DL — SIGNIFICANT CHANGE UP (ref 8.4–10.5)
CALCIUM SERPL-MCNC: 9.4 MG/DL — SIGNIFICANT CHANGE UP (ref 8.4–10.5)
CHLORIDE SERPL-SCNC: 104 MMOL/L — SIGNIFICANT CHANGE UP (ref 98–107)
CHLORIDE SERPL-SCNC: 99 MMOL/L — SIGNIFICANT CHANGE UP (ref 98–107)
CO2 SERPL-SCNC: 23 MMOL/L — SIGNIFICANT CHANGE UP (ref 22–31)
CO2 SERPL-SCNC: 26 MMOL/L — SIGNIFICANT CHANGE UP (ref 22–31)
COVID-19 SPIKE DOMAIN AB INTERP: NEGATIVE — SIGNIFICANT CHANGE UP
COVID-19 SPIKE DOMAIN ANTIBODY RESULT: 0.4 U/ML — SIGNIFICANT CHANGE UP
CREAT SERPL-MCNC: 1.03 MG/DL — SIGNIFICANT CHANGE UP (ref 0.5–1.3)
CREAT SERPL-MCNC: 1.18 MG/DL — SIGNIFICANT CHANGE UP (ref 0.5–1.3)
GLUCOSE SERPL-MCNC: 113 MG/DL — HIGH (ref 70–99)
GLUCOSE SERPL-MCNC: 140 MG/DL — HIGH (ref 70–99)
HCT VFR BLD CALC: 38.3 % — LOW (ref 39–50)
HGB BLD-MCNC: 12.7 G/DL — LOW (ref 13–17)
MAGNESIUM SERPL-MCNC: 1.7 MG/DL — SIGNIFICANT CHANGE UP (ref 1.6–2.6)
MAGNESIUM SERPL-MCNC: 2 MG/DL — SIGNIFICANT CHANGE UP (ref 1.6–2.6)
MCHC RBC-ENTMCNC: 30.6 PG — SIGNIFICANT CHANGE UP (ref 27–34)
MCHC RBC-ENTMCNC: 33.2 GM/DL — SIGNIFICANT CHANGE UP (ref 32–36)
MCV RBC AUTO: 92.3 FL — SIGNIFICANT CHANGE UP (ref 80–100)
NRBC # BLD: 0 /100 WBCS — SIGNIFICANT CHANGE UP
NRBC # FLD: 0 K/UL — SIGNIFICANT CHANGE UP
PHOSPHATE SERPL-MCNC: 2.9 MG/DL — SIGNIFICANT CHANGE UP (ref 2.5–4.5)
PHOSPHATE SERPL-MCNC: 2.9 MG/DL — SIGNIFICANT CHANGE UP (ref 2.5–4.5)
PLATELET # BLD AUTO: 139 K/UL — LOW (ref 150–400)
POTASSIUM SERPL-MCNC: 3.8 MMOL/L — SIGNIFICANT CHANGE UP (ref 3.5–5.3)
POTASSIUM SERPL-MCNC: 5 MMOL/L — SIGNIFICANT CHANGE UP (ref 3.5–5.3)
POTASSIUM SERPL-SCNC: 3.8 MMOL/L — SIGNIFICANT CHANGE UP (ref 3.5–5.3)
POTASSIUM SERPL-SCNC: 5 MMOL/L — SIGNIFICANT CHANGE UP (ref 3.5–5.3)
RBC # BLD: 4.15 M/UL — LOW (ref 4.2–5.8)
RBC # FLD: 13.3 % — SIGNIFICANT CHANGE UP (ref 10.3–14.5)
SARS-COV-2 IGG+IGM SERPL QL IA: 0.4 U/ML — SIGNIFICANT CHANGE UP
SARS-COV-2 IGG+IGM SERPL QL IA: NEGATIVE — SIGNIFICANT CHANGE UP
SODIUM SERPL-SCNC: 135 MMOL/L — SIGNIFICANT CHANGE UP (ref 135–145)
SODIUM SERPL-SCNC: 138 MMOL/L — SIGNIFICANT CHANGE UP (ref 135–145)
WBC # BLD: 10.84 K/UL — HIGH (ref 3.8–10.5)
WBC # FLD AUTO: 10.84 K/UL — HIGH (ref 3.8–10.5)

## 2021-05-27 PROCEDURE — 99239 HOSP IP/OBS DSCHRG MGMT >30: CPT

## 2021-05-27 PROCEDURE — 99232 SBSQ HOSP IP/OBS MODERATE 35: CPT

## 2021-05-27 RX ORDER — POTASSIUM CHLORIDE 20 MEQ
40 PACKET (EA) ORAL ONCE
Refills: 0 | Status: COMPLETED | OUTPATIENT
Start: 2021-05-27 | End: 2021-05-27

## 2021-05-27 RX ORDER — MAGNESIUM SULFATE 500 MG/ML
1 VIAL (ML) INJECTION ONCE
Refills: 0 | Status: COMPLETED | OUTPATIENT
Start: 2021-05-27 | End: 2021-05-27

## 2021-05-27 RX ORDER — SOTALOL HCL 120 MG
120 TABLET ORAL
Refills: 0 | Status: DISCONTINUED | OUTPATIENT
Start: 2021-05-27 | End: 2021-05-27

## 2021-05-27 RX ORDER — SOTALOL HCL 120 MG
1 TABLET ORAL
Qty: 0 | Refills: 0 | DISCHARGE
Start: 2021-05-27

## 2021-05-27 RX ORDER — SOTALOL HCL 120 MG
120 TABLET ORAL
Refills: 0 | Status: DISCONTINUED | OUTPATIENT
Start: 2021-05-27 | End: 2021-05-28

## 2021-05-27 RX ADMIN — CHLORHEXIDINE GLUCONATE 1 APPLICATION(S): 213 SOLUTION TOPICAL at 11:24

## 2021-05-27 RX ADMIN — Medication 120 MILLIGRAM(S): at 12:26

## 2021-05-27 RX ADMIN — APIXABAN 5 MILLIGRAM(S): 2.5 TABLET, FILM COATED ORAL at 22:12

## 2021-05-27 RX ADMIN — LISINOPRIL 10 MILLIGRAM(S): 2.5 TABLET ORAL at 06:04

## 2021-05-27 RX ADMIN — ATORVASTATIN CALCIUM 10 MILLIGRAM(S): 80 TABLET, FILM COATED ORAL at 00:02

## 2021-05-27 RX ADMIN — APIXABAN 5 MILLIGRAM(S): 2.5 TABLET, FILM COATED ORAL at 11:23

## 2021-05-27 RX ADMIN — Medication 120 MILLIGRAM(S): at 20:37

## 2021-05-27 RX ADMIN — PANTOPRAZOLE SODIUM 40 MILLIGRAM(S): 20 TABLET, DELAYED RELEASE ORAL at 06:04

## 2021-05-27 NOTE — DISCHARGE NOTE PROVIDER - NSDCMRMEDTOKEN_GEN_ALL_CORE_FT
aspirin 81 mg oral tablet, chewable: 1 tab(s) orally once a day  Eliquis 5 mg oral tablet: 1 tab(s) orally 2 times a day   Lipitor 10 mg oral tablet: 10 milligram(s) orally every other day  lisinopril 10 mg oral tablet: 1 tab(s) orally once a day  pantoprazole 40 mg oral delayed release tablet: 1 tab(s) orally once a day   Sorine 120 mg oral tablet: 1 tab(s) orally 2 times a day

## 2021-05-27 NOTE — PROGRESS NOTE ADULT - SUBJECTIVE AND OBJECTIVE BOX
PATIENT:  LATOSHA SEGOVIA  [id number]    CHIEF COMPLAINT:  Patient is a 70y old  Male who presents with a chief complaint of weak and diaphoresis (26 May 2021 12:33)      INTERVAL HISTORY/OVERNIGHT EVENTS:  -sotalol held o/n for bradycardia <55 - parameter now changed to <50  -NAEO  -telemetry reviewed - multifocal PVCs, no tachyarrhythmias  -denies CP, SOB, palpitations    REVIEW OF SYSTEMS:    Constitutional:     [ ] negative [ ] fevers [ ] chills [ ] weight loss [ ] weight gain  HEENT:                  [ ] negative [ ] dry eyes [ ] eye irritation [ ] postnasal drip [ ] nasal congestion  CV:                         [ ] negative  [ ] chest pain [ ] orthopnea [ ] palpitations [ ] murmur  Resp:                     [ ] negative [ ] cough [ ] shortness of breath [ ] dyspnea [ ] wheezing [ ] sputum [ ] hemoptysis  GI:                          [ ] negative [ ] nausea [ ] vomiting [ ] diarrhea [ ] constipation [ ] abd pain [ ] dysphagia   :                        [ ] negative [ ] dysuria [ ] nocturia [ ] hematuria [ ] increased urinary frequency  Musculoskeletal: [ ] negative [ ] back pain [ ] myalgias [ ] arthralgias [ ] fracture  Skin:                       [ ] negative [ ] rash [ ] itch  Neurological:        [ ] negative [ ] headache [ ] dizziness [ ] syncope [ ] weakness [ ] numbness  Psychiatric:           [ ] negative [ ] anxiety [ ] depression  Endocrine:            [ ] negative [ ] diabetes [ ] thyroid problem  Heme/Lymph:      [ ] negative [ ] anemia [ ] bleeding problem  Allergic/Immune: [ ] negative [ ] itchy eyes [ ] nasal discharge [ ] hives [ ] angioedema    [x ] All other systems negative  [ ] Unable to assess ROS because ________.    MEDICATIONS:  MEDICATIONS  (STANDING):  apixaban 5 milliGRAM(s) Oral two times a day  aspirin enteric coated 81 milliGRAM(s) Oral daily  atorvastatin 10 milliGRAM(s) Oral at bedtime  chlorhexidine 4% Liquid 1 Application(s) Topical <User Schedule>  lisinopril 10 milliGRAM(s) Oral daily  pantoprazole    Tablet 40 milliGRAM(s) Oral before breakfast  sotalol 120 milliGRAM(s) Oral two times a day    MEDICATIONS  (PRN):      ALLERGIES:  Allergies    No Known Allergies    Intolerances        OBJECTIVE:  ICU Vital Signs Last 24 Hrs  T(C): 37.1 (27 May 2021 07:31), Max: 37.2 (26 May 2021 12:00)  T(F): 98.8 (27 May 2021 07:31), Max: 98.9 (26 May 2021 12:00)  HR: 52 (27 May 2021 07:00) (49 - 62)  BP: 136/69 (27 May 2021 07:00) (89/47 - 136/78)  BP(mean): 86 (27 May 2021 07:00) (56 - 91)  ABP: --  ABP(mean): --  RR: 15 (27 May 2021 07:00) (14 - 22)  SpO2: 96% (27 May 2021 07:00) (94% - 100%)              I&O's Summary    26 May 2021 07:01  -  27 May 2021 07:00  --------------------------------------------------------  IN: 640 mL / OUT: 1480 mL / NET: -840 mL      Daily     Daily     PHYSICAL EXAMINATION:  General: WN/WD NAD  HEENT: PERRLA, EOMI, moist mucous membranes  Neurology: A&Ox3, nonfocal, JONES x 4  Respiratory: CTA B/L, normal respiratory effort, no wheezes, crackles, rales. + L chest wall AICD  CV: RRR, S1S2, no murmurs, rubs or gallops  Abdominal: Soft, NT, ND +BS  Extremities: No edema, + peripheral pulses  Incisions:   Tubes:    LABS:                          12.7   10.84 )-----------( 139      ( 27 May 2021 04:44 )             38.3     05-27    138  |  104  |  20  ----------------------------<  113<H>  5.0   |  23  |  1.18    Ca    8.9      27 May 2021 04:44  Phos  2.9     05-27  Mg     2.0     05-27    TPro  5.4<L>  /  Alb  3.6  /  TBili  0.5  /  DBili  x   /  AST  52<H>  /  ALT  37  /  AlkPhos  34<L>  05-26    LIVER FUNCTIONS - ( 26 May 2021 07:10 )  Alb: 3.6 g/dL / Pro: 5.4 g/dL / ALK PHOS: 34 U/L / ALT: 37 U/L / AST: 52 U/L / GGT: x           PT/INR - ( 25 May 2021 22:53 )   PT: 13.6 sec;   INR: 1.20 ratio         PTT - ( 25 May 2021 22:53 )  PTT:29.5 sec            TELEMETRY:     EKG:     IMAGING:

## 2021-05-27 NOTE — PROGRESS NOTE ADULT - ASSESSMENT
Patient is a 69 Y/O Male pmhx of NICM s/p single lead [employer] AICD, HTN, s/p ablation for afib/AT (5/25/21) present with sudden on set of weakness and diaphoresis X 1hour after the discharge from [facility] today. Found to be in VT and shock X2 and subsequently converted to SR.     #VT - s/p 120J X1, 200J x1, etomidate 10mg ivp, 1 liter NS bolus, s/p shock   -admit to CCU for closer observation.  -give sotalol 120mg po X1 now (given in ED), and continue 120mg PO BID, care as per EP   -mexiletine 150mg po BID initiated in CCU as per Dr. Maldonado.   -c/w Eliquis 5mg po BID  -consider EP to interrogate AICD in am  -keep K>4, mg >2     #NICM  -c/w ASA 81mg po daily  -c/w lipitor 10mg po every other day- last taken on 5/24/21    #HTN  -c/w lisinopril 10mg po daily    #PPX s/p ablation  -Pantoprazole 40 mg po daily (for 1 month)      DVT and GI PPX

## 2021-05-27 NOTE — CHART NOTE - NSCHARTNOTEFT_GEN_A_CORE
MAR Accept Note  Transfer to: Tele/Medicine   Accepting Attending Physician:  Dr. Mullen   Assigned Room:  [address] [place] [address]     Patient seen and examined.   Labs and data reviewed.   No findings precluding transfer of service.       HPI/CCU COURSE:   Please refer to CCU transfer note for full details. Briefly, this is a 70 year old man with history of NICM and ICD for cardiac arrest ~8 years ago, recent ablation for AT and Afib on 5/25,  found to be hypotensive in WCT and shocked x 2. He was given a dose of sotolol and was restarted on his stololol 120 bid and mexilitine 150 bid. He had an episode of WCT in CCU on night of admission which was aborted with ice packs when patient felt flushed (this is what he does at home to avoid AICD shocks). EP felt that his tachycarrhythmias were due to sotalol being held for procedure vs edema 2/2 ablation. After AICD interrogation EP stopped the mexilitine and continued the sotalol. He remained stable on sotalol without any tachycarrhythmias and with sinus bradycardia with occasional ectopy. He was determined to be stable for discharge per EP with close EP follow up.      FOR FOLLOW-UP:  [ ] monitor on telemetry  [ ] f/u EP recs - planned to d/c on sotalol 120mg PO BID  [ ] discharge planning - pt does not have home support.      Sabas Clinton  PGY3

## 2021-05-27 NOTE — PROGRESS NOTE ADULT - SUBJECTIVE AND OBJECTIVE BOX
Name of Patient : LATOSHA SEGOVIA  MRN: [id number]  DATE OF SERVICE: 05-27-21     Subjective: Patient seen and examined. No new events except as noted.     REVIEW OF SYSTEMS:    CONSTITUTIONAL: No weakness, fevers or chills  EYES/ENT: No visual changes;  No vertigo or throat pain   NECK: No pain or stiffness  RESPIRATORY: No cough, wheezing, hemoptysis; No shortness of breath  CARDIOVASCULAR: No chest pain or palpitations  GASTROINTESTINAL: No abdominal or epigastric pain.   GENITOURINARY: No dysuria, frequency or hematuria  NEUROLOGICAL: No numbness or weakness  SKIN: No itching, burning, rashes, or lesions   All other review of systems is negative unless indicated above.    MEDICATIONS:  MEDICATIONS  (STANDING):  apixaban 5 milliGRAM(s) Oral two times a day  aspirin enteric coated 81 milliGRAM(s) Oral daily  atorvastatin 10 milliGRAM(s) Oral at bedtime  chlorhexidine 4% Liquid 1 Application(s) Topical <User Schedule>  lisinopril 10 milliGRAM(s) Oral daily  magnesium sulfate  IVPB 1 Gram(s) IV Intermittent once  pantoprazole    Tablet 40 milliGRAM(s) Oral before breakfast  potassium chloride    Tablet ER 40 milliEquivalent(s) Oral once  sotalol 120 milliGRAM(s) Oral two times a day      PHYSICAL EXAM:  T(C): 37 (05-27-21 @ 21:01), Max: 37.1 (05-27-21 @ 07:31)  HR: 65 (05-27-21 @ 21:01) (49 - 77)  BP: 132/67 (05-27-21 @ 21:01) (120/61 - 151/95)  RR: 18 (05-27-21 @ 21:01) (12 - 22)  SpO2: 98% (05-27-21 @ 21:01) (94% - 100%)  Wt(kg): --  I&O's Summary    26 May 2021 07:01  -  27 May 2021 07:00  --------------------------------------------------------  IN: 640 mL / OUT: 1480 mL / NET: -840 mL    27 May 2021 07:01  -  27 May 2021 23:54  --------------------------------------------------------  IN: 480 mL / OUT: 1650 mL / NET: -1170 mL          Appearance: Normal	  HEENT:  PERRLA   Lymphatic: No lymphadenopathy   Cardiovascular: Normal S1 S2, no JVD  Respiratory: normal effort , clear  Gastrointestinal:  Soft, Non-tender  Skin: No rashes,  warm to touch  Psychiatry:  Mood & affect appropriate  Musculuskeletal: No edema      All labs, Imaging and EKGs personally reviewed       05-26-21 @ 07:01  -  05-27-21 @ 07:00  --------------------------------------------------------  IN: 640 mL / OUT: 1480 mL / NET: -840 mL    05-27-21 @ 07:01  -  05-27-21 @ 23:54  --------------------------------------------------------  IN: 480 mL / OUT: 1650 mL / NET: -1170 mL                            12.7   10.84 )-----------( 139      ( 27 May 2021 04:44 )             38.3               05-27    135  |  99  |  16  ----------------------------<  140<H>  3.8   |  26  |  1.03    Ca    9.4      27 May 2021 20:13  Phos  2.9     05-27  Mg     1.7     05-27    TPro  5.4<L>  /  Alb  3.6  /  TBili  0.5  /  DBili  x   /  AST  52<H>  /  ALT  37  /  AlkPhos  34<L>  05-26

## 2021-05-27 NOTE — PROGRESS NOTE ADULT - ASSESSMENT
70M pmhx of NICM c/b cardiac arrest (2021) s/p single lead [employer] AICD, HTN, s/p ablation for afib/AT (5/25/21) present with sudden on set of weakness and diaphoresis X 1hour after the discharge from [facility] today. Found to be in VT and shock X2 and subsequently converted to SR. CCU consult called for symptomatic frequent VT while in ED. Stable in CCU on sotalol      PLAN  Neuro  -no acute issues    Resp  -no acute issues    CV  #VT - s/p 120J X1, 200J x1, etomidate 10mg ivp, 1 liter NS bolus, s/p shock -->/56 (69), HR 62, RR 20. patient denies CP or any discomfort. while in ED, patient had few more episodes of symptomatic VT which proceeded to RRT. CCU consult requested.  -s/p sotalol 120mg po X1(given in ED)  -continue 120mg PO BID as per Dr. Maldonado.  -mexiletine d/dewey per EP  -c/w Eliquis 5mg po BID  -EP interrogated AICD - rapid atrial rhythms possible PA Vtach  -appreciate EP recs  -keep K>4, mg >2     #NICM  -c/w ASA 81mg po daily  -c/w lipitor 10mg po every other day- last taken on 5/26/21    #HTN  -c/w lisinopril 10mg po daily    GI  #PPX s/p ablation  -Pantoprazole 40 mg po daily (for 1 month)      -no acute issues    Heme  -no acute issues    ID  -leukocytosis - resolved    F: none needed  E: K>4, Mg >2  N: DASH  D: eliquis

## 2021-05-27 NOTE — CHART NOTE - NSCHARTNOTEFT_GEN_A_CORE
70 year old man with history of NICM and ICD for cardiac arrest ~8 years ago. He was getting shocks--probably At/Af; had ablation for AT and Afib on 5/25 and was on his way home 5/25 when felt weak/flushed. He went back to ED and was found to be hypotensive in WCT and shocked x 2. He was given a dose of sotolol and was restarted on his stololol 120 bid and mexilitine 150 bid. He had an episode of WCT in CCU on night of admission which was aborted with ice packs when patient felt flushed (this is what he does at home to avoid AICD shocks). EP felt that his tachycarrhythmias were due to sotalol being held for procedure vs edema 2/2 ablation. After AICD interrogation EP stopped the mexilitine and continued the sotalol. He remained stable on sotalol without any tachycarrhythmias and with sinus bradycardia with occasional ectopy. He was determined to be stable for discharge per EP with close EP follow up.    OBJECTIVE:  ICU Vital Signs Last 24 Hrs  T(C): 37.1 (27 May 2021 07:31), Max: 37.2 (26 May 2021 12:00)  T(F): 98.8 (27 May 2021 07:31), Max: 98.9 (26 May 2021 12:00)  HR: 52 (27 May 2021 07:00) (49 - 62)  BP: 136/69 (27 May 2021 07:00) (89/47 - 136/78)  BP(mean): 86 (27 May 2021 07:00) (56 - 91)  ABP: --  ABP(mean): --  RR: 15 (27 May 2021 07:00) (14 - 22)  SpO2: 96% (27 May 2021 07:00) (94% - 100%)          I&O's Summary    26 May 2021 07:01  -  27 May 2021 07:00  --------------------------------------------------------  IN: 640 mL / OUT: 1480 mL / NET: -840 mL        PHYSICAL EXAMINATION:  General: WN/WD NAD  HEENT: PERRLA, EOMI, moist mucous membranes  Neurology: A&Ox3, nonfocal, JONES x 4  Respiratory: CTA B/L, normal respiratory effort, no wheezes, crackles, rales. + L chest wall AICD  CV: RRR, S1S2, no murmurs, rubs or gallops  Abdominal: Soft, NT, ND +BS  Extremities: No edema, + peripheral pulses      LABS:                          12.7   10.84 )-----------( 139      ( 27 May 2021 04:44 )             38.3     05-27    138  |  104  |  20  ----------------------------<  113<H>  5.0   |  23  |  1.18    Ca    8.9      27 May 2021 04:44  Phos  2.9     05-27  Mg     2.0     05-27    TPro  5.4<L>  /  Alb  3.6  /  TBili  0.5  /  DBili  x   /  AST  52<H>  /  ALT  37  /  AlkPhos  34<L>  05-26    LIVER FUNCTIONS - ( 26 May 2021 07:10 )  Alb: 3.6 g/dL / Pro: 5.4 g/dL / ALK PHOS: 34 U/L / ALT: 37 U/L / AST: 52 U/L / GGT: x           PT/INR - ( 25 May 2021 22:53 )   PT: 13.6 sec;   INR: 1.20 ratio         PTT - ( 25 May 2021 22:53 )  PTT:29.5 sec      Assessment and Plan:   70M pmhx of NICM c/b cardiac arrest (2021) s/p single lead [employer] AICD, HTN, s/p ablation for afib/AT (5/25/21) present with sudden on set of weakness and diaphoresis X 1hour after the discharge from [facility] today. Found to be in VT and shock X2 and subsequently converted to SR. CCU consult called for symptomatic frequent VT while in ED. Stable in CCU on sotalol      PLAN  Neuro  -no acute issues    Resp  -no acute issues    CV  #VT - s/p 120J X1, 200J x1, etomidate 10mg ivp, 1 liter NS bolus, s/p shock -->/56 (69), HR 62, RR 20. patient denies CP or any discomfort. while in ED, patient had few more episodes of symptomatic VT which proceeded to RRT. CCU consult requested.  -s/p sotalol 120mg po X1(given in ED)  -continue 120mg PO BID as per Dr. Maldonado.  -mexiletine d/dewey per EP  -c/w Eliquis 5mg po BID  -EP interrogated AICD - rapid atrial rhythms possible PA Vtach  -appreciate EP recs  -keep K>4, mg >2     #NICM  -c/w ASA 81mg po daily  -c/w lipitor 10mg po every other day- last taken on 5/26/21    #HTN  -c/w lisinopril 10mg po daily    GI  #PPX s/p ablation  -Pantoprazole 40 mg po daily (for 1 month)      -no acute issues    Heme  -no acute issues    ID  -leukocytosis - resolved    F: none needed  E: K>4, Mg >2  N: DASH  D: eliquis      To dos:  [ ] monitor on telemetry  [ ] f/u EP recs - planned to d/c on sotalol 120mg PO BID  [ ] discharge planning - pt does not have home support 70 year old man with history of NICM and ICD for cardiac arrest ~8 years ago. He was getting shocks--probably At/Af; had ablation for AT and Afib on 5/25 and was on his way home 5/25 when felt weak/flushed. He went back to ED and was found to be hypotensive in WCT and shocked x 2. He was given a dose of sotolol and was restarted on his stololol 120 bid and mexilitine 150 bid. He had an episode of WCT in CCU on night of admission which was aborted with ice packs when patient felt flushed (this is what he does at home to avoid AICD shocks). EP felt that his tachycarrhythmias were due to sotalol being held for procedure vs edema 2/2 ablation. After AICD interrogation EP stopped the mexilitine and continued the sotalol. He remained stable on sotalol without any tachycarrhythmias and with sinus bradycardia with occasional ectopy. He was determined to be stable for discharge per EP with close EP follow up.    Signed out to Jocelin Best    OBJECTIVE:  ICU Vital Signs Last 24 Hrs  T(C): 37.1 (27 May 2021 07:31), Max: 37.2 (26 May 2021 12:00)  T(F): 98.8 (27 May 2021 07:31), Max: 98.9 (26 May 2021 12:00)  HR: 52 (27 May 2021 07:00) (49 - 62)  BP: 136/69 (27 May 2021 07:00) (89/47 - 136/78)  BP(mean): 86 (27 May 2021 07:00) (56 - 91)  ABP: --  ABP(mean): --  RR: 15 (27 May 2021 07:00) (14 - 22)  SpO2: 96% (27 May 2021 07:00) (94% - 100%)          I&O's Summary    26 May 2021 07:01  -  27 May 2021 07:00  --------------------------------------------------------  IN: 640 mL / OUT: 1480 mL / NET: -840 mL        PHYSICAL EXAMINATION:  General: WN/WD NAD  HEENT: PERRLA, EOMI, moist mucous membranes  Neurology: A&Ox3, nonfocal, JONES x 4  Respiratory: CTA B/L, normal respiratory effort, no wheezes, crackles, rales. + L chest wall AICD  CV: RRR, S1S2, no murmurs, rubs or gallops  Abdominal: Soft, NT, ND +BS  Extremities: No edema, + peripheral pulses      LABS:                          12.7   10.84 )-----------( 139      ( 27 May 2021 04:44 )             38.3     05-27    138  |  104  |  20  ----------------------------<  113<H>  5.0   |  23  |  1.18    Ca    8.9      27 May 2021 04:44  Phos  2.9     05-27  Mg     2.0     05-27    TPro  5.4<L>  /  Alb  3.6  /  TBili  0.5  /  DBili  x   /  AST  52<H>  /  ALT  37  /  AlkPhos  34<L>  05-26    LIVER FUNCTIONS - ( 26 May 2021 07:10 )  Alb: 3.6 g/dL / Pro: 5.4 g/dL / ALK PHOS: 34 U/L / ALT: 37 U/L / AST: 52 U/L / GGT: x           PT/INR - ( 25 May 2021 22:53 )   PT: 13.6 sec;   INR: 1.20 ratio         PTT - ( 25 May 2021 22:53 )  PTT:29.5 sec      Assessment and Plan:   70M pmhx of NICM c/b cardiac arrest (2021) s/p single lead [employer] AICD, HTN, s/p ablation for afib/AT (5/25/21) present with sudden on set of weakness and diaphoresis X 1hour after the discharge from [facility] today. Found to be in VT and shock X2 and subsequently converted to SR. CCU consult called for symptomatic frequent VT while in ED. Stable in CCU on sotalol      PLAN  Neuro  -no acute issues    Resp  -no acute issues    CV  #VT - s/p 120J X1, 200J x1, etomidate 10mg ivp, 1 liter NS bolus, s/p shock -->/56 (69), HR 62, RR 20. patient denies CP or any discomfort. while in ED, patient had few more episodes of symptomatic VT which proceeded to RRT. CCU consult requested.  -s/p sotalol 120mg po X1(given in ED)  -continue 120mg PO BID as per Dr. Maldonado.  -mexiletine d/dewey per EP  -c/w Eliquis 5mg po BID  -EP interrogated AICD - rapid atrial rhythms possible PA Vtach  -appreciate EP recs  -keep K>4, mg >2     #NICM  -c/w ASA 81mg po daily  -c/w lipitor 10mg po every other day- last taken on 5/26/21    #HTN  -c/w lisinopril 10mg po daily    GI  #PPX s/p ablation  -Pantoprazole 40 mg po daily (for 1 month)      -no acute issues    Heme  -no acute issues    ID  -leukocytosis - resolved    F: none needed  E: K>4, Mg >2  N: DASH  D: eliquis      To dos:  [ ] monitor on telemetry  [ ] f/u EP recs - planned to d/c on sotalol 120mg PO BID  [ ] discharge planning - pt does not have home support

## 2021-05-27 NOTE — PROGRESS NOTE ADULT - ASSESSMENT
70M Hx of NICM s/p single lead [employer] AICD, HTN, s/p ablation for AF/AT (5/25/21) present with sudden on set of weakness and diaphoresis X 1hour after the discharge from [facility] today, found to be in both narrow complex tachycardia and WCT.    #arrhythmias  -multifocal PVCs, WCT most consistent with RVOT VT, and numerous episodes of suspected atrial arrhythmia on device interrogation  -suspect 2/2 decrease sotalol dosing / held for ablation on 5/25  -no events on telemetry overnight  -c/w sotalol 120mg PO BID; please contact EP if there is a concern to give a dose  -d/c mexiletine for now  -monitor for arrhythmic burden    Zaid Michael MD  Cardiology Fellow - PGY 5  Text or Call: [phone]  For all New Consults and Questions:  [email or web address]   Login: [employer]

## 2021-05-27 NOTE — DISCHARGE NOTE PROVIDER - HOSPITAL COURSE
70 year old man with history of NICM and ICD for cardiac arrest ~8 years ago. He was getting shocks--probably At/Af; had ablation for AT and Afib on 5/25 and was on his way home 5/25 when felt weak/flushed. He went back to ED and was found to be hypotensive in WCT and shocked x 2. He was given a dose of sotolol and was restarted on his stololol 120 bid and mexilitine 150 bid. He had an episode of WCT in CCU on night of admission which was aborted with ice packs when patient felt flushed (this is what he does at home to avoid AICD shocks). EP felt that his tachycarrhythmias were due to sotalol being held for procedure vs edema 2/2 ablation. After AICD interrogation EP stopped the mexilitine and continued the sotalol. He remained stable on sotalol without any tachycarrhythmias and with sinus bradycardia with occasional ectopy. He was determined to be stable for discharge per EP with close EP follow up. 70 year old man with history of NICM and ICD for cardiac arrest ~8 years ago. He was getting shocks--probably At/Af; had ablation for AT and Afib on 5/25 and was on his way home 5/25 when felt weak/flushed. He went back to ED and was found to be hypotensive in WCT and shocked x 2. He was given a dose of sotolol and was restarted on his stololol 120 bid and mexilitine 150 bid. He had an episode of WCT in CCU on night of admission which was aborted with ice packs when patient felt flushed (this is what he does at home to avoid AICD shocks). EP felt that his tachycarrhythmias were due to sotalol being held for procedure vs edema 2/2 ablation. After AICD interrogation EP stopped the mexilitine and continued the sotalol. He remains stable on sotalol with sinus bradycardia with occasional ectopy. He was determined to be stable for discharge per EP with close EP follow up.     70 year old man with history of NICM and ICD for cardiac arrest ~8 years ago. He was getting shocks--probably At/Af; had ablation for AT and Afib on 5/25 and was on his way home 5/25 when felt weak/flushed. He went back to ED and was found to be hypotensive in WCT and shocked x 2. He was given a dose of sotolol and was restarted on his stololol 120 bid and mexilitine 150 bid. He had an episode of WCT in CCU on night of admission which was aborted with ice packs when patient felt flushed (this is what he does at home to avoid AICD shocks). EP felt that his tachycarrhythmias were due to sotalol being held for procedure vs edema 2/2 ablation. After AICD interrogation EP stopped the mexilitine and continued the sotalol. He remains stable on sotalol with sinus bradycardia with occasional ectopy. He was determined to be stable for discharge per EP with close EP follow up.    Care discussed with patient and he is ready to be discharged.   Spoke with MD Zaid Michael Cardiology fellow on 5/28 and is cleared for discharge and to follow up with out patient EP     70 year old man with history of NICM and ICD for cardiac arrest ~8 years ago. He was getting shocks--probably At/Af; had ablation for AT and Afib on 5/25 and was on his way home 5/25 when felt weak/flushed. He went back to ED and was found to be hypotensive in WCT and shocked x 2. He was given a dose of sotolol and was restarted on his stololol 120 bid and mexilitine 150 bid. He had an episode of WCT in CCU on night of admission which was aborted with ice packs when patient felt flushed (this is what he does at home to avoid AICD shocks). EP felt that his tachycarrhythmias were due to sotalol being held for procedure vs edema 2/2 ablation. After AICD interrogation EP stopped the mexilitine and continued the sotalol. He remains stable on sotalol with sinus bradycardia with occasional ectopy. He was determined to be stable for discharge per EP with close EP follow up.    Care discussed with patient and he is ready to be discharged.   Spoke with MD Zaid Michael Cardiology fellow and Dr. Mullen on 5/28 and is cleared for discharge and to follow up with out patient EP

## 2021-05-27 NOTE — PROGRESS NOTE ADULT - SUBJECTIVE AND OBJECTIVE BOX
Patient seen and examined at bedside.    Overnight Events:  no events overnight. feels.    Review Of Systems: No chest pain, shortness of breath, or palpitations            Current Meds:  apixaban 5 milliGRAM(s) Oral two times a day  aspirin enteric coated 81 milliGRAM(s) Oral daily  atorvastatin 10 milliGRAM(s) Oral at bedtime  chlorhexidine 4% Liquid 1 Application(s) Topical <User Schedule>  lisinopril 10 milliGRAM(s) Oral daily  pantoprazole    Tablet 40 milliGRAM(s) Oral before breakfast  sotalol 120 milliGRAM(s) Oral two times a day      Vitals:  T(F): 98 (05-27), Max: 98.8 (05-27)  HR: 59 (05-27) (49 - 71)  BP: 142/73 (05-27) (103/60 - 147/82)  RR: 20 (05-27)  SpO2: 97% (05-27)  I&O's Summary    26 May 2021 07:01  -  27 May 2021 07:00  --------------------------------------------------------  IN: 640 mL / OUT: 1480 mL / NET: -840 mL    27 May 2021 07:01  -  27 May 2021 16:36  --------------------------------------------------------  IN: 480 mL / OUT: 1650 mL / NET: -1170 mL        Physical Exam:  Appearance: No acute distress; well appearing  Eyes: PERRL, EOMI, pink conjunctiva  HEENT: Normal oral mucosa  Cardiovascular: RRR, S1, S2, no murmurs, rubs, or gallops; no edema; no JVD  Respiratory: Clear to auscultation bilaterally  Gastrointestinal: soft, non-tender, non-distended with normal bowel sounds  Musculoskeletal: No clubbing; no joint deformity   Neurologic: Non-focal  Lymphatic: No lymphadenopathy  Psychiatry: AAOx3, mood & affect appropriate  Skin: No rashes, ecchymoses, or cyanosis                          12.7   10.84 )-----------( 139      ( 27 May 2021 04:44 )             38.3     05-27    138  |  104  |  20  ----------------------------<  113<H>  5.0   |  23  |  1.18    Ca    8.9      27 May 2021 04:44  Phos  2.9     05-27  Mg     2.0     05-27    TPro  5.4<L>  /  Alb  3.6  /  TBili  0.5  /  DBili  x   /  AST  52<H>  /  ALT  37  /  AlkPhos  34<L>  05-26    PT/INR - ( 25 May 2021 22:53 )   PT: 13.6 sec;   INR: 1.20 ratio         PTT - ( 25 May 2021 22:53 )  PTT:29.5 sec  CARDIAC MARKERS ( 25 May 2021 22:35 )  1149 ng/L / x     / x     / x     / x     / x          Serum Pro-Brain Natriuretic Peptide: 630 pg/mL (05-25 @ 22:35)    Interpretation of Telemetry: rare PVCs, NSR, sinus bradycardia

## 2021-05-27 NOTE — PROVIDER CONTACT NOTE (OTHER) - SITUATION
patient had 12 beats VT at 1850. patient asymptomatic. patient then had 24 beats VT at 1949, also asymptomatic.

## 2021-05-27 NOTE — DISCHARGE NOTE PROVIDER - CARE PROVIDER_API CALL
Mert Maldonaod)  Cardiac Electrophysiology; Cardiology; Internal Medicine  [phone] [place] [address]  Phone: [phone]  Fax: [phone]  Follow Up Time:

## 2021-05-27 NOTE — DISCHARGE NOTE PROVIDER - NSDCCPCAREPLAN_GEN_ALL_CORE_FT
PRINCIPAL DISCHARGE DIAGNOSIS  Diagnosis: Ventricular tachycardia  Assessment and Plan of Treatment:        PRINCIPAL DISCHARGE DIAGNOSIS  Diagnosis: Ventricular tachycardia  Assessment and Plan of Treatment: You were having fast heart rhythm that caused you to feel weak and faint. You were restarted on your stololol which has helped control your rhythm. Continue to take Sotalol as ordered. Your AICD was interrogated while in the hospital, please continue to follow up out patient.       PRINCIPAL DISCHARGE DIAGNOSIS  Diagnosis: Ventricular tachycardia  Assessment and Plan of Treatment: You were having fast heart rhythm that caused you to feel weak and faint. You were restarted on your stololol which has helped control your rhythm. Continue to take Sotalol as ordered. Your AICD was interrogated while in the hospital, please follow up with device clinic on June 24th at 1030am

## 2021-05-28 ENCOUNTER — TRANSCRIPTION ENCOUNTER (OUTPATIENT)
Age: 71
End: 2021-05-28

## 2021-05-28 VITALS
RESPIRATION RATE: 18 BRPM | SYSTOLIC BLOOD PRESSURE: 116 MMHG | TEMPERATURE: 98 F | OXYGEN SATURATION: 95 % | DIASTOLIC BLOOD PRESSURE: 67 MMHG | HEART RATE: 57 BPM

## 2021-05-28 LAB
ANION GAP SERPL CALC-SCNC: 11 MMOL/L — SIGNIFICANT CHANGE UP (ref 7–14)
BUN SERPL-MCNC: 14 MG/DL — SIGNIFICANT CHANGE UP (ref 7–23)
CALCIUM SERPL-MCNC: 9 MG/DL — SIGNIFICANT CHANGE UP (ref 8.4–10.5)
CHLORIDE SERPL-SCNC: 101 MMOL/L — SIGNIFICANT CHANGE UP (ref 98–107)
CO2 SERPL-SCNC: 24 MMOL/L — SIGNIFICANT CHANGE UP (ref 22–31)
CREAT SERPL-MCNC: 1.01 MG/DL — SIGNIFICANT CHANGE UP (ref 0.5–1.3)
GLUCOSE SERPL-MCNC: 116 MG/DL — HIGH (ref 70–99)
HCT VFR BLD CALC: 39.4 % — SIGNIFICANT CHANGE UP (ref 39–50)
HGB BLD-MCNC: 13.2 G/DL — SIGNIFICANT CHANGE UP (ref 13–17)
MAGNESIUM SERPL-MCNC: 2 MG/DL — SIGNIFICANT CHANGE UP (ref 1.6–2.6)
MCHC RBC-ENTMCNC: 30.9 PG — SIGNIFICANT CHANGE UP (ref 27–34)
MCHC RBC-ENTMCNC: 33.5 GM/DL — SIGNIFICANT CHANGE UP (ref 32–36)
MCV RBC AUTO: 92.3 FL — SIGNIFICANT CHANGE UP (ref 80–100)
NRBC # BLD: 0 /100 WBCS — SIGNIFICANT CHANGE UP
NRBC # FLD: 0 K/UL — SIGNIFICANT CHANGE UP
PHOSPHATE SERPL-MCNC: 2.9 MG/DL — SIGNIFICANT CHANGE UP (ref 2.5–4.5)
PLATELET # BLD AUTO: 131 K/UL — LOW (ref 150–400)
POTASSIUM SERPL-MCNC: 4.2 MMOL/L — SIGNIFICANT CHANGE UP (ref 3.5–5.3)
POTASSIUM SERPL-SCNC: 4.2 MMOL/L — SIGNIFICANT CHANGE UP (ref 3.5–5.3)
RBC # BLD: 4.27 M/UL — SIGNIFICANT CHANGE UP (ref 4.2–5.8)
RBC # FLD: 13 % — SIGNIFICANT CHANGE UP (ref 10.3–14.5)
SODIUM SERPL-SCNC: 136 MMOL/L — SIGNIFICANT CHANGE UP (ref 135–145)
WBC # BLD: 10.43 K/UL — SIGNIFICANT CHANGE UP (ref 3.8–10.5)
WBC # FLD AUTO: 10.43 K/UL — SIGNIFICANT CHANGE UP (ref 3.8–10.5)

## 2021-05-28 PROCEDURE — 99232 SBSQ HOSP IP/OBS MODERATE 35: CPT

## 2021-05-28 RX ADMIN — Medication 40 MILLIEQUIVALENT(S): at 00:41

## 2021-05-28 RX ADMIN — Medication 120 MILLIGRAM(S): at 09:30

## 2021-05-28 RX ADMIN — LISINOPRIL 10 MILLIGRAM(S): 2.5 TABLET ORAL at 06:09

## 2021-05-28 RX ADMIN — PANTOPRAZOLE SODIUM 40 MILLIGRAM(S): 20 TABLET, DELAYED RELEASE ORAL at 06:09

## 2021-05-28 RX ADMIN — APIXABAN 5 MILLIGRAM(S): 2.5 TABLET, FILM COATED ORAL at 11:14

## 2021-05-28 RX ADMIN — Medication 100 GRAM(S): at 00:41

## 2021-05-28 RX ADMIN — CHLORHEXIDINE GLUCONATE 1 APPLICATION(S): 213 SOLUTION TOPICAL at 06:12

## 2021-05-28 NOTE — PROGRESS NOTE ADULT - SUBJECTIVE AND OBJECTIVE BOX
Name of Patient : LATOSHA SEGOVIA  MRN: [id number]  DATE OF SERVICE: 05-28-21     Subjective: Patient seen and examined. No new events except as noted.   doing okay   D/C planing     REVIEW OF SYSTEMS:    CONSTITUTIONAL: No weakness, fevers or chills  EYES/ENT: No visual changes;  No vertigo or throat pain   NECK: No pain or stiffness  RESPIRATORY: No cough, wheezing, hemoptysis; No shortness of breath  CARDIOVASCULAR: No chest pain or palpitations  GASTROINTESTINAL: No abdominal or epigastric pain. No nausea, vomiting, or hematemesis; No diarrhea or constipation. No melena or hematochezia.  GENITOURINARY: No dysuria, frequency or hematuria  NEUROLOGICAL: No numbness or weakness  SKIN: No itching, burning, rashes, or lesions   All other review of systems is negative unless indicated above.    MEDICATIONS:  MEDICATIONS  (STANDING):  apixaban 5 milliGRAM(s) Oral two times a day  aspirin enteric coated 81 milliGRAM(s) Oral daily  atorvastatin 10 milliGRAM(s) Oral at bedtime  chlorhexidine 4% Liquid 1 Application(s) Topical <User Schedule>  lisinopril 10 milliGRAM(s) Oral daily  pantoprazole    Tablet 40 milliGRAM(s) Oral before breakfast  sotalol 120 milliGRAM(s) Oral two times a day      PHYSICAL EXAM:  T(C): 36.7 (05-28-21 @ 14:56), Max: 37.2 (05-28-21 @ 06:00)  HR: 57 (05-28-21 @ 14:56) (57 - 77)  BP: 116/67 (05-28-21 @ 14:56) (104/65 - 151/95)  RR: 18 (05-28-21 @ 14:56) (16 - 19)  SpO2: 95% (05-28-21 @ 14:56) (95% - 100%)  Wt(kg): --  I&O's Summary    27 May 2021 07:01  -  28 May 2021 07:00  --------------------------------------------------------  IN: 480 mL / OUT: 2800 mL / NET: -2320 mL          Appearance: Normal	  HEENT:  PERRLA   Lymphatic: No lymphadenopathy   Cardiovascular: Normal S1 S2, no JVD  Respiratory: normal effort , clear  Gastrointestinal:  Soft, Non-tender  Skin: No rashes,  warm to touch  Psychiatry:  Mood & affect appropriate  Musculuskeletal: No edema      All labs, Imaging and EKGs personally reviewed     05-27-21 @ 07:01  -  05-28-21 @ 07:00  --------------------------------------------------------  IN: 480 mL / OUT: 2800 mL / NET: -2320 mL                              13.2   10.43 )-----------( 131      ( 28 May 2021 06:48 )             39.4               05-28    136  |  101  |  14  ----------------------------<  116<H>  4.2   |  24  |  1.01    Ca    9.0      28 May 2021 06:48  Phos  2.9     05-28  Mg     2.0     05-28

## 2021-05-28 NOTE — PROGRESS NOTE ADULT - ATTENDING COMMENTS
70 year old man with history of NICM and ICD. He was getting shocks--probably At/Af; had ablation for AT and Afib and was on his way home yesterday when felt palpitations. Went to ED-->hypotensive in WCT and shocked x 2  #Neuro- No active issues  #Pulm- No active issues  #CV- Known NICM with ICD in place  sp ATach and AF ablation yesterday  Now with persistent WCT  He can, of note, abort his arrhythmia, with ice packs  Stopped Mexilitene yesterday  on Sotalol 120 mg BID (held overnight due to bradycardia)  Continue Lisinopril 10 mg BID  On Eliquis  #ID- Increased WBC but afebrile, will monitor  #Renal- Continue to monitor  #DVT ppx- on Eliquis
70M Hx of NICM s/p single lead [employer] AICD, HTN, s/p ablation for AF/AT (5/25/21) p/w both narrow complex tachycardia and WCT. S/P PVI, CTI, posterior wall isolation. Now s/p ICD shock for VT. Cont sotalol. Will fu in office.
70M Hx of NICM s/p single lead [employer] AICD, HTN, s/p ablation for AF/AT (5/25/21) present with sudden on set of weakness and diaphoresis X 1hour after the discharge from [facility] today, found to have RVOT VT. Will resume sotalol, d/c noah. Will cont eliquis. Will fu.
70M Hx of NICM s/p single lead [employer] AICD, HTN, s/p ablation for AF/AT (5/25/21) present with sudden on set of weakness and diaphoresis X 1hour after the discharge from [facility] today, found to have RVOT VT. Will resume sotalol, d/c noah. Will cont eliquis. Will fu.
70 year old man with history of NICM and ICD. He was getting shocks--probably At/Af; had ablation for AT and Afib and was on his way home yesterday when felt palpitations. Went to ED-->hypotensive in WCT and shocked x 2  #Neuro- No active issues  #Pulm- No active issues  #CV- Known NICM with ICD in place  sp ATach and AF ablation yesterday  Now with persistent WCT  He can, of note, abort his arrhythmia, with ice packs  Started on Mexilitene 150 mg BID  Started on Sotalol 120 mg BID  Continue Lisinopril 10 mg BID  On Eliquis  #ID- Increased WBC but afebrile, will monitor  #Renal- Continue to monitor  #DVT ppx- on Eliquis

## 2021-05-28 NOTE — PROGRESS NOTE ADULT - REASON FOR ADMISSION
weak and diaphoresis

## 2021-05-28 NOTE — PROGRESS NOTE ADULT - SUBJECTIVE AND OBJECTIVE BOX
Patient seen and examined at bedside.    Overnight Events:   ~10s NSVT, asx, HDS.  feels well.    Review Of Systems: No chest pain, shortness of breath, or palpitations            Current Meds:  apixaban 5 milliGRAM(s) Oral two times a day  aspirin enteric coated 81 milliGRAM(s) Oral daily  atorvastatin 10 milliGRAM(s) Oral at bedtime  chlorhexidine 4% Liquid 1 Application(s) Topical <User Schedule>  lisinopril 10 milliGRAM(s) Oral daily  pantoprazole    Tablet 40 milliGRAM(s) Oral before breakfast  sotalol 120 milliGRAM(s) Oral two times a day      Vitals:  T(F): 98.9 (05-28), Max: 98.9 (05-28)  HR: 64 (05-28) (54 - 77)  BP: 104/65 (05-28) (104/65 - 151/95)  RR: 17 (05-28)  SpO2: 97% (05-28)  I&O's Summary    27 May 2021 07:01  -  28 May 2021 07:00  --------------------------------------------------------  IN: 480 mL / OUT: 2800 mL / NET: -2320 mL        Physical Exam:  Appearance: No acute distress; well appearing  Eyes: PERRL, EOMI, pink conjunctiva  HEENT: Normal oral mucosa  Cardiovascular: RRR, S1, S2, no murmurs, rubs, or gallops; no edema; no JVD  Respiratory: Clear to auscultation bilaterally  Gastrointestinal: soft, non-tender, non-distended with normal bowel sounds  Musculoskeletal: No clubbing; no joint deformity   Neurologic: Non-focal  Lymphatic: No lymphadenopathy  Psychiatry: AAOx3, mood & affect appropriate  Skin: No rashes, ecchymoses, or cyanosis                          13.2   10.43 )-----------( 131      ( 28 May 2021 06:48 )             39.4     05-28    136  |  101  |  14  ----------------------------<  116<H>  4.2   |  24  |  1.01    Ca    9.0      28 May 2021 06:48  Phos  2.9     05-28  Mg     2.0     05-28        CARDIAC MARKERS ( 25 May 2021 22:35 )  1149 ng/L / x     / x     / x     / x     / x          Serum Pro-Brain Natriuretic Peptide: 630 pg/mL (05-25 @ 22:35)          New ECG(s): Personally reviewed    Interpretation of Telemetry: 2x runs NSVT, longest 10seconds, 2x PVCs

## 2021-05-28 NOTE — PROGRESS NOTE ADULT - ASSESSMENT
Patient is a 71 Y/O Male pmhx of NICM s/p single lead [employer] AICD, HTN, s/p ablation for afib/AT (5/25/21) present with sudden on set of weakness and diaphoresis X 1hour after the discharge from [facility] today. Found to be in VT and shock X2 and subsequently converted to SR.     #VT - s/p 120J X1, 200J x1, etomidate 10mg ivp, 1 liter NS bolus, s/p shock   -admit to CCU for closer observation.  -give sotalol 120mg po X1 now (given in ED), and continue 120mg PO BID, care as per EP   -mexiletine 150mg po BID initiated in CCU as per Dr. Maldonado.   -c/w Eliquis 5mg po BID  -keep K>4, mg >2     #NICM  -c/w ASA 81mg po daily  -c/w lipitor 10mg po every other day- last taken on 5/24/21    #HTN  -c/w lisinopril 10mg po daily    #PPX s/p ablation  -Pantoprazole 40 mg po daily       DVT and GI PPX       D/C planing

## 2021-05-28 NOTE — DISCHARGE NOTE NURSING/CASE MANAGEMENT/SOCIAL WORK - PATIENT PORTAL LINK FT
You can access the FollowMyHealth Patient Portal offered by [facility] by registering at the following website: [email or web address]. By joining [facility] [employer]’s FollowMyHealth portal, you will also be able to view your health information using other applications (apps) compatible with our system.

## 2021-05-28 NOTE — PROGRESS NOTE ADULT - PROVIDER SPECIALTY LIST ADULT
Electrophysiology
Internal Medicine
Internal Medicine
CCU
Internal Medicine
CCU
Electrophysiology
Electrophysiology

## 2021-05-28 NOTE — PROGRESS NOTE ADULT - ASSESSMENT
70M Hx of NICM s/p single lead [employer] AICD, HTN, s/p ablation for AF/AT (5/25/21) p/w both narrow complex tachycardia and WCT.    #arrhythmias  -multifocal PVCs, WCT most consistent with RVOT VT, and numerous episodes of suspected atrial arrhythmia on device interrogation  -suspect 2/2 decrease sotalol dosing / held for ablation on 5/25  -2x NSVT overnight, no additional events  -QTc stable 460s  -c/w sotalol 120mg PO BID    Zaid Michael MD  Cardiology Fellow - PGY 5  Text or Call: [phone]  For all New Consults and Questions:  [email or web address]   Login: [employer]

## 2021-06-21 ENCOUNTER — APPOINTMENT (OUTPATIENT)
Dept: ELECTROPHYSIOLOGY | Facility: CLINIC | Age: 71
End: 2021-06-21
Payer: COMMERCIAL

## 2021-06-21 VITALS
OXYGEN SATURATION: 99 % | HEIGHT: 71 IN | BODY MASS INDEX: 21 KG/M2 | SYSTOLIC BLOOD PRESSURE: 153 MMHG | WEIGHT: 150 LBS | HEART RATE: 62 BPM | DIASTOLIC BLOOD PRESSURE: 91 MMHG | TEMPERATURE: 96.8 F

## 2021-06-21 PROCEDURE — 99215 OFFICE O/P EST HI 40 MIN: CPT

## 2021-06-21 PROCEDURE — 99072 ADDL SUPL MATRL&STAF TM PHE: CPT

## 2021-06-21 PROCEDURE — 93000 ELECTROCARDIOGRAM COMPLETE: CPT

## 2021-06-21 NOTE — CARDIOLOGY SUMMARY
[de-identified] : 3/2021, Bileaflet mitral valve prolapse. Mild-moderate mitral regurgitation. Calcified trileaflet aortic valve with normal opening.Mild aortic regurgitation. Mildly dilated left atrium. LA volume index = 38 cc/m2. Normal left ventricular internal dimensions and wall thicknesses. Normal left ventricular systolic function. No segmental wall motion abnormalities. Mild diastolic dysfunction (Stage I). Normal right ventricular size and function. A device wire is noted in the right heart. LVEF 59%.

## 2021-06-21 NOTE — HISTORY OF PRESENT ILLNESS
[FreeTextEntry1] : Henrik aBuer is a 69y/o man with Hx of HTN, HLD, NICM with CAC and noted LVEF 30% s/p ICD placement (2013) and now with recurrent SVT/AT s/p ICD shocks, on Sotalol, and now s/p AT ablation on 5/25/2021 who presents today for routine f/u. On initial visit, was recently hospitalized in March 2021 for recurrent ICD shocks for SVT/AT, suspected to be atrial fib/flutter. Was initiated on Sotalol and Eliquis. Now s/p AT ablation, feeling well. Post ablation c/b VT with shock. Remains on Sotalol without recurrence. Denies chest pain, palpitations, SOB, syncope or near syncope. Right groin without pain, swelling, or bruising.\par

## 2021-06-21 NOTE — DISCUSSION/SUMMARY
[FreeTextEntry1] : Impression:\par \par 1. AT/aflutter: s/p ablation on 5/25/2021. EKG performed today to assess for recurrent tachyarrhythmias and conduction disease and reveals NSR with PVCs, QTc WNL. Quick check of ICD without evidence of recurrent AT or VT. Resume Sotalol 120mg BID as prescribed. Remains on Eliquis, eager to discontinue. If remains free of afib/AT after 3 mo, consider discontinuing at that time. \par \par 2. HTN: resume oral antihypertensives as prescribed. Encouraged heart healthy diet, sodium restriction, and weight loss. Continue regular f/u with Cardiologist for further HTN management.\par \par 3. HLD: resume statin therapy as prescribed and regular f/u with Cardiologist for routine lipid monitoring and management.\par \par 4. NICM: s/p ICD placement. Device checked today and revealed normal ICD functioning. No further ICD shocks for AT or VT. Resume routine device checks and remote monitoring as scheduled. Resume OMT as prescribed, encouraged heart healthy diet, daily weight, and regular f/u with Cardiology/Heart failure team as scheduled.\par \par Continue f/u with Cardiologist and RTO for f/u in 3 months.

## 2021-06-23 ENCOUNTER — NON-APPOINTMENT (OUTPATIENT)
Age: 71
End: 2021-06-23

## 2021-07-15 ENCOUNTER — APPOINTMENT (OUTPATIENT)
Dept: ELECTROPHYSIOLOGY | Facility: CLINIC | Age: 71
End: 2021-07-15
Payer: COMMERCIAL

## 2021-07-15 ENCOUNTER — NON-APPOINTMENT (OUTPATIENT)
Age: 71
End: 2021-07-15

## 2021-07-15 PROCEDURE — 93296 REM INTERROG EVL PM/IDS: CPT

## 2021-07-15 PROCEDURE — 93295 DEV INTERROG REMOTE 1/2/MLT: CPT

## 2021-10-14 ENCOUNTER — NON-APPOINTMENT (OUTPATIENT)
Age: 71
End: 2021-10-14

## 2021-10-14 ENCOUNTER — APPOINTMENT (OUTPATIENT)
Dept: ELECTROPHYSIOLOGY | Facility: CLINIC | Age: 71
End: 2021-10-14
Payer: COMMERCIAL

## 2021-10-14 PROCEDURE — 93296 REM INTERROG EVL PM/IDS: CPT

## 2021-10-14 PROCEDURE — 93295 DEV INTERROG REMOTE 1/2/MLT: CPT

## 2021-10-21 ENCOUNTER — APPOINTMENT (OUTPATIENT)
Dept: ELECTROPHYSIOLOGY | Facility: CLINIC | Age: 71
End: 2021-10-21
Payer: COMMERCIAL

## 2021-10-21 VITALS
WEIGHT: 150 LBS | DIASTOLIC BLOOD PRESSURE: 92 MMHG | HEART RATE: 55 BPM | BODY MASS INDEX: 21 KG/M2 | HEIGHT: 71 IN | SYSTOLIC BLOOD PRESSURE: 170 MMHG | OXYGEN SATURATION: 98 %

## 2021-10-21 PROCEDURE — 99215 OFFICE O/P EST HI 40 MIN: CPT

## 2021-10-21 PROCEDURE — 93000 ELECTROCARDIOGRAM COMPLETE: CPT

## 2021-10-21 NOTE — DISCUSSION/SUMMARY
[FreeTextEntry1] : Impression:\par \par 1. AT/aflutter: s/p ablation on 5/25/2021. EKG performed today to assess for recurrent tachyarrhythmias and conduction disease and reveals NSR with PVCs, QTc WNL. Quick check of ICD without evidence of recurrent AT or VT. Resume Sotalol 120mg BID as prescribed. Remains on Eliquis, eager to discontinue. Recommend continuing Eliquis until 6 mo post ablation and then may discontinue at that time. Verbalizes understanding of thromboembolic risk without use of AC if afib is to recur. \par \par 2. HTN: resume oral antihypertensives as prescribed. Encouraged heart healthy diet, sodium restriction, and weight loss. Continue regular f/u with Cardiologist for further HTN management.\par \par 3. HLD: resume statin therapy as prescribed and regular f/u with Cardiologist for routine lipid monitoring and management.\par \par 4. NICM: s/p ICD placement. Device checked today and revealed normal ICD functioning. No further ICD shocks for AT or VT. Resume routine device checks and remote monitoring as scheduled. Resume OMT as prescribed, encouraged heart healthy diet, daily weight, and regular f/u with Cardiology/Heart failure team as scheduled.\par \par Continue f/u with Cardiologist and RTO for f/u in 3 months.

## 2021-10-21 NOTE — CARDIOLOGY SUMMARY
[de-identified] : 3/2021, Bileaflet mitral valve prolapse. Mild-moderate mitral regurgitation. Calcified trileaflet aortic valve with normal opening.Mild aortic regurgitation. Mildly dilated left atrium. LA volume index = 38 cc/m2. Normal left ventricular internal dimensions and wall thicknesses. Normal left ventricular systolic function. No segmental wall motion abnormalities. Mild diastolic dysfunction (Stage I). Normal right ventricular size and function. A device wire is noted in the right heart. LVEF 59%.

## 2021-10-21 NOTE — HISTORY OF PRESENT ILLNESS
[FreeTextEntry1] : Henrik Bauer is a 71y/o man with Hx of HTN, HLD, NICM with CAC and noted LVEF 30% s/p ICD placement (2013) and now with recurrent SVT/AT s/p ICD shocks, on Sotalol, and now s/p AT ablation on 5/25/2021 who presents today for routine f/u. On initial visit, was recently hospitalized in March 2021 for recurrent ICD shocks for SVT/AT, suspected to be atrial fib/flutter. Was initiated on Sotalol and Eliquis. Now s/p AT ablation, feeling well. Post ablation c/b VT with shock. Remains on Sotalol without recurrence. Denies chest pain, palpitations, SOB, syncope or near syncope. \par

## 2021-10-24 ENCOUNTER — NON-APPOINTMENT (OUTPATIENT)
Age: 71
End: 2021-10-24

## 2022-01-27 ENCOUNTER — NON-APPOINTMENT (OUTPATIENT)
Age: 72
End: 2022-01-27

## 2022-01-27 ENCOUNTER — APPOINTMENT (OUTPATIENT)
Dept: ELECTROPHYSIOLOGY | Facility: CLINIC | Age: 72
End: 2022-01-27
Payer: COMMERCIAL

## 2022-01-27 PROCEDURE — 93295 DEV INTERROG REMOTE 1/2/MLT: CPT

## 2022-01-27 PROCEDURE — 93296 REM INTERROG EVL PM/IDS: CPT

## 2022-04-07 ENCOUNTER — APPOINTMENT (OUTPATIENT)
Dept: ELECTROPHYSIOLOGY | Facility: CLINIC | Age: 72
End: 2022-04-07
Payer: COMMERCIAL

## 2022-04-07 ENCOUNTER — NON-APPOINTMENT (OUTPATIENT)
Age: 72
End: 2022-04-07

## 2022-04-07 VITALS
TEMPERATURE: 96.8 F | BODY MASS INDEX: 21 KG/M2 | SYSTOLIC BLOOD PRESSURE: 160 MMHG | DIASTOLIC BLOOD PRESSURE: 94 MMHG | RESPIRATION RATE: 16 BRPM | OXYGEN SATURATION: 99 % | HEART RATE: 59 BPM | WEIGHT: 150 LBS | HEIGHT: 71 IN

## 2022-04-07 DIAGNOSIS — I49.3 VENTRICULAR PREMATURE DEPOLARIZATION: ICD-10-CM

## 2022-04-07 DIAGNOSIS — I47.1 SUPRAVENTRICULAR TACHYCARDIA: ICD-10-CM

## 2022-04-07 PROCEDURE — 93000 ELECTROCARDIOGRAM COMPLETE: CPT

## 2022-04-07 PROCEDURE — 99215 OFFICE O/P EST HI 40 MIN: CPT

## 2022-04-07 NOTE — HISTORY OF PRESENT ILLNESS
[FreeTextEntry1] : Henrik Bauer is a 70y/o man with Hx of HTN, HLD, NICM with CAC and noted LVEF 30% s/p ICD placement (2013) and now with recurrent SVT/AT s/p ICD shocks, on Sotalol, and now s/p AT ablation on 5/25/2021 who presents today for routine f/u. On initial visit, was recently hospitalized in March 2021 for recurrent ICD shocks for SVT/AT, suspected to be atrial fib/flutter. Was initiated on Sotalol and Eliquis. Post ablation c/b VT with shock. Remains on Sotalol without recurrence. Has only brief <5 sec of NSVT. Does feel occasional PVCs with palpitations. Infrequent but does make him concerned. Denies chest pain, SOB, syncope or near syncope. \par

## 2022-04-07 NOTE — DISCUSSION/SUMMARY
[FreeTextEntry1] : Impression:\par \par 1. AT/aflutter: s/p ablation on 5/25/2021. EKG performed today to assess for recurrent tachyarrhythmias and conduction disease and reveals NSR with PVC, QTc WNL. Quick check of ICD without evidence of recurrent AT or VT. Resume Sotalol 120mg BID as prescribed. Does feel occasional PVCs but prior EKGs reviewed and reveals multi focal PVCs. Reassurance provided. \par \par 2. HTN: resume oral antihypertensives as prescribed. Encouraged heart healthy diet, sodium restriction, and weight loss. Continue regular f/u with Cardiologist for further HTN management.\par \par 3. HLD: resume statin therapy as prescribed and regular f/u with Cardiologist for routine lipid monitoring and management.\par \par 4. NICM: s/p ICD placement. Last remote monitoring of device reviewed and revealed normal ICD functioning. No further ICD shocks for AT or VT. Last ECHO with LVEF improved to 59%. Resume routine device checks and remote monitoring as scheduled. Resume OMT as prescribed, encouraged heart healthy diet, daily weight, and regular f/u with Cardiology/Heart failure team as scheduled.\par \par Continue f/u with Cardiologist and RTO for f/u in 6 months- 1 year.

## 2022-04-07 NOTE — CARDIOLOGY SUMMARY
[de-identified] : 3/2021: Bileaflet mitral valve prolapse. Mild-moderate mitral regurgitation. Calcified trileaflet aortic valve with normal opening.Mild aortic regurgitation. Mildly dilated left atrium. LA volume index = 38 cc/m2. Normal left ventricular internal dimensions and wall thicknesses. Normal left ventricular systolic function. No segmental wall motion abnormalities. Mild diastolic dysfunction (Stage I). Normal right ventricular size and function. A device wire is noted in the right heart. LVEF 59%.

## 2022-04-28 ENCOUNTER — APPOINTMENT (OUTPATIENT)
Dept: ELECTROPHYSIOLOGY | Facility: CLINIC | Age: 72
End: 2022-04-28
Payer: COMMERCIAL

## 2022-04-28 ENCOUNTER — NON-APPOINTMENT (OUTPATIENT)
Age: 72
End: 2022-04-28

## 2022-04-28 PROCEDURE — 93296 REM INTERROG EVL PM/IDS: CPT

## 2022-04-28 PROCEDURE — 93295 DEV INTERROG REMOTE 1/2/MLT: CPT

## 2022-07-01 NOTE — DISCHARGE NOTE PROVIDER - NSRESEARCHGRANT_HIDDEN_GEN_A_CORE
Received call from Bernice Roberts at [facility] with Red Flag Complaint. Subjective: Caller states \"I'll start at the top. I've been waking up every morning with a small headache. It's not bad. When I wake up I have a headache. That passes after I get up and walk around. About two weeks ago I woke up with a really sore toe and it was swollen above that toe on the top of my foot. I think that has kind of passed. Now when I wake up in the morning my fingers are stiff and my thumb is swollen on my left hand. \"     Current Symptoms: swelling/pain in fingers-left hand, stiffness and soreness in fingers of right hand, swelling on the top of left foot and second toe-RESOLVED, headaches upon waking-resolves quickly, NO difficulty breathing, NO chest pain, fatigue-needing naps frequently, itching on left shoulder on back and neck-NO rash    NO hx of Arthritis or Gout    NO hx of current symptoms    Hx of Pacemaker placed on 5/16/22    Patient taking Eloquist and BP medication- patient states side effects of these medications can be current symptoms    Onset: 2 weeks ago; waxing and waning    Associated Symptoms: reduced activity    Pain Severity: 5/10; soreness; intermittent when bending fingers    Temperature: Denies    What has been tried: Tylenol-helps with pain    LMP: NA Pregnant: NA    Recommended disposition: See PCP within 24 Hours. Patient agreeable. Care advice provided, patient verbalizes understanding; denies any other questions or concerns; instructed to call back for any new or worsening symptoms. Patient/Caller agrees with recommended disposition; writer provided warm transfer to [facility] at [facility] for appointment scheduling. Attention Provider: Thank you for allowing me to participate in the care of your patient. The patient was connected to triage in response to information provided to the ECC/PSC.   Please do not respond through this encounter as the response is not directed to a Yes

## 2022-07-28 ENCOUNTER — NON-APPOINTMENT (OUTPATIENT)
Age: 72
End: 2022-07-28

## 2022-07-28 ENCOUNTER — APPOINTMENT (OUTPATIENT)
Dept: ELECTROPHYSIOLOGY | Facility: CLINIC | Age: 72
End: 2022-07-28

## 2022-07-28 PROCEDURE — 93296 REM INTERROG EVL PM/IDS: CPT

## 2022-07-28 PROCEDURE — 93295 DEV INTERROG REMOTE 1/2/MLT: CPT

## 2022-09-06 NOTE — DISCHARGE NOTE PROVIDER - PROVIDER TOKENS
September 6, 2022       Miguel Angel Sanderson MD   [facility] [id number]  Via In Basket      Patient: Ibis Cabral   YOB: 1945   Date of Visit: 9/6/2022       Dear Miguel Angel Sanderson MD:    I wanted to try and provide you with an update on Dalia Cabral. Below are my notes for my visit with her on September 6, 2022.    If you have questions, please do not hesitate to call me.       Sincerely,        Sena Capellan MD        CC: No Recipients  Sena Capellan MD  9/6/2022 10:32 PM  Signed  [facility]  ENT CONSULT NOTE    REQUESTING PROVIDER:  Miguel Angel Sanderson MD      CHIEF COMPLAINT:  Throat Problem (Per Dr. Sanderson, cough, paradoxical vocal cord disorder)      SUBJECTIVE:  Ibis Cabral is a 76 year old female seen in consultation today at the request of Miguel Angel Sanderson MD for chronic cough and shortness of breath.  Patient reports she is a prior smoker, onset of severe cough 8 months ago.  Started in January.  She reports that the cough can be exhausting.  Once she gets started the cough can last quite a while.  Seems to be worsening.  In the last month or 2 has not been able to sleep lying down as lying down induces the cough.  At times the cough is productive.  Bringing up clear thick liquids.  Filled a paper cup she keeps by her bedside at night.  When she coughs a lot her throat kind of closes, stridor and gasping or air.       Has also noted that the cough can be triggered by excessive talking, as her throat starts to get dry she can start to feel a tickle in her throat that can trigger a cough.  Short spurts of talking and conversation did not seem to trigger the cough.  Cough is also triggered by working outside in her garden.  Does not particularly find that odors are a trigger.    She notes that she has heard wheezing when she is breathing.  Wheezing is worse with physical activity.  Wheezing is present when she breathes out.  She and her  have also noted for the past  4 months that she can hear gurgling sounds in her throat with her breathing.  Her shortness of breath is certainly worse with physical activity.  Shortness of breath does improve somewhat with use of her albuterol inhaler     She denies any dysphagia or odynophagia no otalgia    Has tried flonase, astelin, cough drops, nyquil, tea with honey, tessalon perles, albuterol inhaler helps for a short while.    No known covid infection.  Previous soker stopped 11 years ago.     HISTORIES:  ALLERGIES:  No Known Allergies  Current Outpatient Medications   Medication Sig Dispense Refill   • azelastine (ASTELIN) 0.1 % nasal spray Spray 1 spray in each nostril in the morning and 1 spray in the evening. Use in each nostril as directed 30 mL 11   • fluticasone (Flonase Sensimist) 27.5 MCG/SPRAY nasal spray Spray 1 spray in each nostril in the morning and 1 spray in the evening. 11.8 mL 11   • losartan (COZAAR) 25 MG tablet Take 1 tablet by mouth daily. 30 tablet 3   • albuterol (PROAIR RESPICLICK) 108 (90 Base) MCG/ACT inhaler Inhale 2 puffs into the lungs every 4 hours as needed for Shortness of Breath or Wheezing. 1 each 12   • levothyroxine 100 MCG tablet TAKE 1 TABLET BY MOUTH  DAILY 30 tablet 11   • Anoro Ellipta 62.5-25 MCG/INH inhaler USE 1 INHALATION BY MOUTH  DAILY 120 each 5   • simvastatin (ZOCOR) 40 MG tablet Take 1 tablet by mouth nightly. 90 tablet 3   • Polyethylene Glycol 3350 (MIRALAX PO) Take by mouth daily as needed.     • acetaminophen (TYLENOL) 500 MG tablet Take 500 mg by mouth every 6 hours as needed for Pain.     • Cholecalciferol (VITAMIN D) 2000 UNITS Cap Take 2,000 Units by mouth daily.        No current facility-administered medications for this visit.     Past Medical History:   Diagnosis Date   • Abnormal EKG    • COPD (chronic obstructive pulmonary disease) (CMS/HCC)    • Gastroesophageal reflux disease      Past Surgical History:   Procedure Laterality Date   • Anes arthroscopy knee,surg Right    •  Colonoscopy     • Colonoscopy      no polyps   • Dexa bone density axial skeleton  2009   • Echocardiogram  2017   • Echocardiogram  2018    limited   • Echocardiogram  2019   • Echocardiogram  2022   • Hysteroscopy w/ polypectomy  2019   • Incisional breast biopsy   was the most recent time    2 times on right side   • Laparoscopy, cholecystectomy     • Lipoma resection      4 or 5 lipomas have been removed recently   • Removal gallbladder     • Stress test  2017   • Tubal ligation     • Us aorta screening  2017     Social History     Tobacco Use   • Smoking status: Former Smoker     Packs/day: 0.50     Years: 46.00     Pack years: 23.00     Types: Cigarettes     Quit date: 2011     Years since quittin.6   • Smokeless tobacco: Never Used   Vaping Use   • Vaping Use: never used   Substance Use Topics   • Alcohol use: No   • Drug use: No     Family History   Problem Relation Age of Onset   • Heart disease Mother         Congestive heart failure  at 71yo   • High blood pressure Mother    • Stroke Father          in 70s   • * Brother         Head injury caused head injury   • Aneurysm Brother         Abdominal aneurysm  in 70s   • Heart disease Brother 44        Myocardial infarction (ETOH and smoker)   • COPD Sister    • Congestive Heart Failure Sister    • Cancer Sister         Pancreatic cancer   • * Sister         Benign breast biopsy   • Diabetes Sister    • Heart disease Sister         heart valve replacement   • * Sister         Benign breast biopsy   • Heart disease Sister    • Other Brother 76        suddenly  ?CVD   • Stroke Daughter 49       REVIEW OF SYSTEMS:  12 point review of system negative except:  As in HPI    PHYSICAL EXAM:  Vital Signs:   weight is 79.4 kg (175 lb 0.7 oz). Her temporal temperature is 97.6 °F (36.4 °C). Her blood pressure is 138/78.     2022    Vitals:    22 0824   BP: 138/78   Temp:  97.6 °F (36.4 °C)     Audible crackling, wheezing after physical exertion.    On exam, patient is alert, oriented x3, well-developed, well-nourished, in no apparent distress.  Patient is attentive and responds to questions appropriately.  Voice is not hoarse.    Overall appearance of the head and neck is normal.  Facial symmetry and movement are within normal limits bilaterally, and skin is warm and dry.    Extraocular movement is preserved.    Ears:  Normal pinnae, ear canals, and tympanic membranes bilaterally.  Normal motion of the TM (tympanic membrane) on pneumatic otoscopy.  No TMJ (temporomandibular joint) tenderness.  No mastoid process tenderness.  Speech reception within normal limits.    Nose:  Normal septum, bridge, and nasal mucosa.  No sinus tenderness on percussion.    Oral cavity:  Normal lips, gums, floor of mouth, buccal mucosa, tongue, and hard palate.    Oropharynx:  Normal soft palate, posterior pharyngeal wall, tonsillar fossae and tonsils.    Neck:  No abnormal masses, thyroid masses or enlargement, lymphadenopathy, crepitus or tenderness.  Submandibular and parotid glands normal to palpation.  Trachea midline.  Loud expiratory wheezes heard over the neck after mild exertion.    Chest: marked wheezing both inspiratory and expiratory throughout lung fields.  Wheezes audible after mild exertion.        Cardiovascular:  No peripheral edema, normal peripheral perfusion, no pallor.    Neurologic:  Cranial nerve function grossly within normal limits.      LABORATORY DATA:  none    IMAGING STUDIES:  CT lung screening reviewed.    PROCEDURE:  Flexible Fiberoptic Laryngoscopy:  The patient/guardian gave verbal consent after being explained the indications and details of the procedure.  Face-to-face timeout was performed.  The nasal cavity was topically anesthetized with 4% lidocaine, vasoconstricted with Afrin, and the flexible scope was inserted into both nasal cavities.    Right Nasal cavity -  Turbinates normal.  No masses, polyps or purulence noted.    Left Nasal cavity - Turbinates normal.  No masses, polyps or purulence noted.    Nasopharynx - Normal, no masses, eustachian tube orifices clear.    Oropharynx -   Base of tongue, vallecula, soft palate, posterior pharyngeal wall normal.    Hypopharynx - Posterior pharyngeal wall, postcricoid region and pyriform sinuses normal.    Larynx -  Supraglottis - Epiglottis, aryepiglottic folds, false cords normal.    Glottis - True vocal cords with normal morphology and motion, posterior commissure and arytenoids normal,  Flexible laryngoscopy performed again after some exercise induced shortness of breath and wheezing.  Patient was asked to walk approximately 50 yd in the office, and returned with excessive shortness of breath, wheezing, and increased work of breathing.  Flexible laryngoscopy done while the patient was having these expiratory and inspiratory wheezes was done and no paradoxical vocal cord movements were observed.    Subglottis - Immediately visible portion of subglottis seen from above vocal cords was normal.      ASSESSMENT:  1. Chronic cough    2. Wheezing on auscultation    3. SOB (shortness of breath) on exertion        Past 8 months patient has had wheezing, excessive cough, shortness of breath.  Is worsened with any physical activity, and sometimes triggered by long conversation.  Has also been triggered by position changes such as attempting to lie flat.  Patient does not have cough triggered by odors, does not cough after eating or drinking.    Flexible laryngoscopy did not demonstrate any paradoxical vocal fold movements.  A brief period of exercise was done to trigger an episode of shortness of breath and flexible laryngoscopy at that time still did not show any paradoxical movements.  Was noted that even a small amount of exertion, walking 50 yd caused patient a great deal of shortness of breath, audible wheezing both inspiratory and  expiratory.  I would like to get a CT scan of neck to rule out any upper tracheal lesions but no obstruction was seen in the subglottis.  Suspect patient's issues are pulmonary, COPD, and bronchomalacia.  Noted that patient has very poor exercise tolerance, and took quite a while to recover after her brief period of exertion.  Patient to follow-up with pulmonology.    PLAN:  Orders Placed This Encounter   • CT NECK SOFT TISSUE W CONTRAST           The patient and  indicated understanding of the diagnosis and agreed with the plan of care.       PROVIDER:[TOKEN:[[id number]]] PROVIDER:[TOKEN:[[id number]]],PROVIDER:[TOKEN:[[id number]]]

## 2022-09-29 ENCOUNTER — NON-APPOINTMENT (OUTPATIENT)
Age: 72
End: 2022-09-29

## 2022-10-06 ENCOUNTER — NON-APPOINTMENT (OUTPATIENT)
Age: 72
End: 2022-10-06

## 2022-10-06 ENCOUNTER — APPOINTMENT (OUTPATIENT)
Dept: ELECTROPHYSIOLOGY | Facility: CLINIC | Age: 72
End: 2022-10-06

## 2022-10-06 ENCOUNTER — RX RENEWAL (OUTPATIENT)
Age: 72
End: 2022-10-06

## 2022-10-06 VITALS
HEIGHT: 71 IN | DIASTOLIC BLOOD PRESSURE: 106 MMHG | TEMPERATURE: 97 F | HEART RATE: 61 BPM | WEIGHT: 149 LBS | OXYGEN SATURATION: 100 % | BODY MASS INDEX: 20.86 KG/M2 | SYSTOLIC BLOOD PRESSURE: 164 MMHG

## 2022-10-06 PROCEDURE — 93000 ELECTROCARDIOGRAM COMPLETE: CPT

## 2022-10-06 PROCEDURE — 99215 OFFICE O/P EST HI 40 MIN: CPT | Mod: 25

## 2022-10-06 RX ORDER — SOTALOL HYDROCHLORIDE 120 MG/1
120 TABLET ORAL
Qty: 180 | Refills: 3 | Status: ACTIVE | COMMUNITY
Start: 1900-01-01 | End: 1900-01-01

## 2022-10-06 NOTE — DISCUSSION/SUMMARY
[EKG obtained to assist in diagnosis and management of assessed problem(s)] : EKG obtained to assist in diagnosis and management of assessed problem(s) [FreeTextEntry1] : Impression:\par \par 1. VT: s/p ICD shock for VT x2. Hx of VT and ICD shock while hospitalized in the past and also with recurrent NSVT and PVCs. Given recurrent VT despite Sotalol, recommend undergoing VT ablation. Risks, benefits, and alternatives to procedure discussed at length. Risks including that of bleeding, infection, stroke, and cardiac tamponade discussed and he verbalizes understanding of all. Will hold all medications the morning of the ablation. May take all regularly scheduled medications the night before. Hold Sotalol x3 days prior to procedure. Will also add metoprolol in interim to help with VT suppression, metoprolol 25mg BID. Will hold prior to ablation as well. \par \par 2. AT/aflutter: s/p ablation on 5/25/2021. EKG performed today to assess for recurrent tachyarrhythmias and conduction disease and reveals NSR with PVCs, QTc WNL. Resume Sotalol 120mg BID as prescribed. Resume Eliquis for thromboembolic prophylaxis. \par \par 3. HTN: resume oral antihypertensives as prescribed. Encouraged heart healthy diet, sodium restriction, and weight loss. Continue regular f/u with Cardiologist for further HTN management.\par \par 4. HLD: resume statin therapy as prescribed and regular f/u with Cardiologist for routine lipid monitoring and management.\par \par 5. NICM: s/p ICD placement. Last remote monitoring of device reviewed and revealed normal ICD functioning. No further ICD shocks for AT or VT. Last ECHO with LVEF improved to 59%. Resume routine device checks and remote monitoring as scheduled. Resume OMT as prescribed, encouraged heart healthy diet, daily weight, and regular f/u with Cardiology/Heart failure team as scheduled.\par \par Plan for VT ablation and RTO for f/u post procedure.

## 2022-10-06 NOTE — HISTORY OF PRESENT ILLNESS
[FreeTextEntry1] : Henrik Bauer is a 71y/o man with Hx of HTN, HLD, NICM with CAC and noted LVEF 30% s/p ICD placement (2013) and now with recurrent SVT/AT s/p ICD shocks, on Sotalol, and now s/p AT ablation on 5/25/2021 who presents today for routine f/u after recent shock of VT. Recalls the night of shock, he had taken his Sotalol as scheduled. Got up to take his statin as scheduled and go to the bathroom before getting shocked. Hasn't been feeling well. Feels extra fatigue and very upset over ICD shocks. Denies chest pain, palpitations, SOB, syncope or near syncope.\par \par On initial visit, was recently hospitalized in March 2021 for recurrent ICD shocks for SVT/AT, suspected to be atrial fib/flutter. Was initiated on Sotalol and Eliquis. Post ablation c/b VT with shock. \par

## 2022-10-06 NOTE — CARDIOLOGY SUMMARY
[de-identified] : 3/2021: Bileaflet mitral valve prolapse. Mild-moderate mitral regurgitation. Calcified trileaflet aortic valve with normal opening.Mild aortic regurgitation. Mildly dilated left atrium. LA volume index = 38 cc/m2. Normal left ventricular internal dimensions and wall thicknesses. Normal left ventricular systolic function. No segmental wall motion abnormalities. Mild diastolic dysfunction (Stage I). Normal right ventricular size and function. A device wire is noted in the right heart. LVEF 59%. \par

## 2022-10-20 ENCOUNTER — OUTPATIENT (OUTPATIENT)
Dept: OUTPATIENT SERVICES | Facility: HOSPITAL | Age: 72
LOS: 1 days | End: 2022-10-20

## 2022-10-20 VITALS
RESPIRATION RATE: 16 BRPM | OXYGEN SATURATION: 98 % | WEIGHT: 145.95 LBS | HEART RATE: 62 BPM | TEMPERATURE: 98 F | DIASTOLIC BLOOD PRESSURE: 86 MMHG | SYSTOLIC BLOOD PRESSURE: 147 MMHG | HEIGHT: 71 IN

## 2022-10-20 DIAGNOSIS — Z98.890 OTHER SPECIFIED POSTPROCEDURAL STATES: Chronic | ICD-10-CM

## 2022-10-20 DIAGNOSIS — I47.20 VENTRICULAR TACHYCARDIA, UNSPECIFIED: ICD-10-CM

## 2022-10-20 DIAGNOSIS — Z95.810 PRESENCE OF AUTOMATIC (IMPLANTABLE) CARDIAC DEFIBRILLATOR: Chronic | ICD-10-CM

## 2022-10-20 LAB
ALBUMIN SERPL ELPH-MCNC: 5.2 G/DL — HIGH (ref 3.3–5)
ALP SERPL-CCNC: 52 U/L — SIGNIFICANT CHANGE UP (ref 40–120)
ALT FLD-CCNC: 15 U/L — SIGNIFICANT CHANGE UP (ref 4–41)
ANION GAP SERPL CALC-SCNC: 14 MMOL/L — SIGNIFICANT CHANGE UP (ref 7–14)
AST SERPL-CCNC: 22 U/L — SIGNIFICANT CHANGE UP (ref 4–40)
BILIRUB SERPL-MCNC: 1.2 MG/DL — SIGNIFICANT CHANGE UP (ref 0.2–1.2)
BLD GP AB SCN SERPL QL: NEGATIVE — SIGNIFICANT CHANGE UP
BUN SERPL-MCNC: 14 MG/DL — SIGNIFICANT CHANGE UP (ref 7–23)
CALCIUM SERPL-MCNC: 10.3 MG/DL — SIGNIFICANT CHANGE UP (ref 8.4–10.5)
CHLORIDE SERPL-SCNC: 94 MMOL/L — LOW (ref 98–107)
CO2 SERPL-SCNC: 26 MMOL/L — SIGNIFICANT CHANGE UP (ref 22–31)
CREAT SERPL-MCNC: 1.06 MG/DL — SIGNIFICANT CHANGE UP (ref 0.5–1.3)
EGFR: 75 ML/MIN/1.73M2 — SIGNIFICANT CHANGE UP
GLUCOSE SERPL-MCNC: 106 MG/DL — HIGH (ref 70–99)
HCT VFR BLD CALC: 51.4 % — HIGH (ref 39–50)
HGB BLD-MCNC: 17.2 G/DL — HIGH (ref 13–17)
MCHC RBC-ENTMCNC: 30 PG — SIGNIFICANT CHANGE UP (ref 27–34)
MCHC RBC-ENTMCNC: 33.5 GM/DL — SIGNIFICANT CHANGE UP (ref 32–36)
MCV RBC AUTO: 89.7 FL — SIGNIFICANT CHANGE UP (ref 80–100)
NRBC # BLD: 0 /100 WBCS — SIGNIFICANT CHANGE UP (ref 0–0)
NRBC # FLD: 0 K/UL — SIGNIFICANT CHANGE UP (ref 0–0)
PLATELET # BLD AUTO: 212 K/UL — SIGNIFICANT CHANGE UP (ref 150–400)
POTASSIUM SERPL-MCNC: 4.3 MMOL/L — SIGNIFICANT CHANGE UP (ref 3.5–5.3)
POTASSIUM SERPL-SCNC: 4.3 MMOL/L — SIGNIFICANT CHANGE UP (ref 3.5–5.3)
PROT SERPL-MCNC: 7.8 G/DL — SIGNIFICANT CHANGE UP (ref 6–8.3)
RBC # BLD: 5.73 M/UL — SIGNIFICANT CHANGE UP (ref 4.2–5.8)
RBC # FLD: 13.3 % — SIGNIFICANT CHANGE UP (ref 10.3–14.5)
RH IG SCN BLD-IMP: POSITIVE — SIGNIFICANT CHANGE UP
SODIUM SERPL-SCNC: 134 MMOL/L — LOW (ref 135–145)
WBC # BLD: 11.65 K/UL — HIGH (ref 3.8–10.5)
WBC # FLD AUTO: 11.65 K/UL — HIGH (ref 3.8–10.5)

## 2022-10-20 RX ORDER — ATORVASTATIN CALCIUM 80 MG/1
10 TABLET, FILM COATED ORAL
Qty: 0 | Refills: 0 | DISCHARGE

## 2022-10-20 RX ORDER — ASPIRIN/CALCIUM CARB/MAGNESIUM 324 MG
1 TABLET ORAL
Qty: 0 | Refills: 0 | DISCHARGE

## 2022-10-20 RX ORDER — LISINOPRIL 2.5 MG/1
1 TABLET ORAL
Qty: 0 | Refills: 0 | DISCHARGE

## 2022-10-20 NOTE — H&P PST ADULT - CARDIOVASCULAR COMMENTS
Pt hx ICD placed 2013 with inconsistent f/u - had several episodes of firing late February /early March with recurrent SVT/AT

## 2022-10-20 NOTE — H&P PST ADULT - NSICDXPASTMEDICALHX_GEN_ALL_CORE_FT
PAST MEDICAL HISTORY:  HTN (hypertension)     Nonischemic cardiomyopathy      PAST MEDICAL HISTORY:  HLD (hyperlipidemia)     HTN (hypertension)     Nonischemic cardiomyopathy

## 2022-10-20 NOTE — H&P PST ADULT - NSICDXPASTSURGICALHX_GEN_ALL_CORE_FT
PAST SURGICAL HISTORY:  S/P implantation of automatic cardioverter/defibrillator (AICD)      PAST SURGICAL HISTORY:  S/P ablation of atrial fibrillation     S/P ablation of atrial flutter     S/P implantation of automatic cardioverter/defibrillator (AICD)

## 2022-10-20 NOTE — H&P PST ADULT - NEGATIVE GENERAL GENITOURINARY SYMPTOMS
no hematuria/no dysuria no hematuria/no flank pain L/no flank pain R/no bladder infections/no dysuria

## 2022-10-20 NOTE — H&P PST ADULT - MALLAMPATI CLASS
Oriented - self; Oriented - place; Oriented - time
with phonation/Class III - visualization of the soft palate and the base of the uvula

## 2022-10-20 NOTE — H&P PST ADULT - HISTORY OF PRESENT ILLNESS
72 yr old male scheduled for Complex ablation with dr Maldonado tentatively 5/25/21 - pt hx NICM s/p single lead [employer] AICD, HTN hospitalized 3/21 after several firings of AICD and episode of tachycardia  3/4. Pt had AICD interrogated in the ED, demonstrated episodes of SVT  (likely a fib, a flutter) with inappropriate therapy. Pt is poor historian   Pt denies COVID   Pt hx Moderna vaccine  Pt to have COVID preop test    72 yr old male HTN, NICM (non ischemic cardiomyopathy), AFIB., S/P ICD placement (2013)  HLD presents for pre op evaluation with pre op diagnosis: ventricular tachycardia in preparation for VT ablation with [employer]

## 2022-10-20 NOTE — H&P PST ADULT - PROBLEM SELECTOR PLAN 1
Schedule for VT Ablation with [employer] tentatively on 11/01/22. Pre op instructions given and explained. Pt instructed to follow surgeon's instructions as directed. Pt verbalized understanding.  Covid-19 PCR ordered.

## 2022-10-20 NOTE — H&P PST ADULT - RESPIRATORY
clear to auscultation bilaterally/no wheezes/no rales/no rhonchi/no respiratory distress/no subcutaneous emphysema/airway patent/breath sounds equal/good air movement/respirations non-labored/no intercostal retractions

## 2022-10-24 NOTE — PATIENT PROFILE ADULT - NSTRANSFERBELONGINGSDISPO_GEN_A_NUR
Patient in stable condition. Pain well controlled with scheduled meds. Fundus firm, lochia light. Abdominal dressing intact. SCD's on and working. Parker draining clear yellow urine to gravity at bedside. VSS. Tolerating regular diet. Bonding well with baby. Denies needs at this time. Will transfer to [facility] for care.    not applicable

## 2022-10-29 LAB — SARS-COV-2 RNA SPEC QL NAA+PROBE: SIGNIFICANT CHANGE UP

## 2022-11-01 ENCOUNTER — INPATIENT (INPATIENT)
Facility: HOSPITAL | Age: 72
LOS: 1 days | Discharge: HOME | End: 2022-11-03
Attending: STUDENT IN AN ORGANIZED HEALTH CARE EDUCATION/TRAINING PROGRAM | Admitting: STUDENT IN AN ORGANIZED HEALTH CARE EDUCATION/TRAINING PROGRAM

## 2022-11-01 VITALS
HEART RATE: 82 BPM | RESPIRATION RATE: 18 BRPM | DIASTOLIC BLOOD PRESSURE: 56 MMHG | OXYGEN SATURATION: 98 % | TEMPERATURE: 98 F | SYSTOLIC BLOOD PRESSURE: 119 MMHG

## 2022-11-01 DIAGNOSIS — Z95.810 PRESENCE OF AUTOMATIC (IMPLANTABLE) CARDIAC DEFIBRILLATOR: Chronic | ICD-10-CM

## 2022-11-01 DIAGNOSIS — Z98.890 OTHER SPECIFIED POSTPROCEDURAL STATES: Chronic | ICD-10-CM

## 2022-11-01 DIAGNOSIS — I47.20 VENTRICULAR TACHYCARDIA, UNSPECIFIED: ICD-10-CM

## 2022-11-01 PROCEDURE — 93654 COMPRE EP EVAL TX VT: CPT

## 2022-11-01 PROCEDURE — 93010 ELECTROCARDIOGRAM REPORT: CPT

## 2022-11-01 PROCEDURE — 93010 ELECTROCARDIOGRAM REPORT: CPT | Mod: 77

## 2022-11-01 PROCEDURE — 93662 INTRACARDIAC ECG (ICE): CPT | Mod: 26

## 2022-11-01 PROCEDURE — 93623 PRGRMD STIMJ&PACG IV RX NFS: CPT | Mod: 26

## 2022-11-01 PROCEDURE — 93462 L HRT CATH TRNSPTL PUNCTURE: CPT

## 2022-11-01 RX ORDER — ASCORBIC ACID 60 MG
500 TABLET,CHEWABLE ORAL DAILY
Refills: 0 | Status: DISCONTINUED | OUTPATIENT
Start: 2022-11-01 | End: 2022-11-03

## 2022-11-01 RX ORDER — ASPIRIN/CALCIUM CARB/MAGNESIUM 324 MG
1 TABLET ORAL
Qty: 0 | Refills: 0 | DISCHARGE

## 2022-11-01 RX ORDER — ATORVASTATIN CALCIUM 80 MG/1
10 TABLET, FILM COATED ORAL AT BEDTIME
Refills: 0 | Status: DISCONTINUED | OUTPATIENT
Start: 2022-11-01 | End: 2022-11-03

## 2022-11-01 RX ORDER — LISINOPRIL 2.5 MG/1
1 TABLET ORAL
Qty: 0 | Refills: 0 | DISCHARGE

## 2022-11-01 RX ORDER — LISINOPRIL 2.5 MG/1
10 TABLET ORAL DAILY
Refills: 0 | Status: DISCONTINUED | OUTPATIENT
Start: 2022-11-01 | End: 2022-11-03

## 2022-11-01 RX ORDER — APIXABAN 2.5 MG/1
5 TABLET, FILM COATED ORAL EVERY 12 HOURS
Refills: 0 | Status: DISCONTINUED | OUTPATIENT
Start: 2022-11-02 | End: 2022-11-03

## 2022-11-01 RX ORDER — ATORVASTATIN CALCIUM 80 MG/1
1 TABLET, FILM COATED ORAL
Qty: 0 | Refills: 0 | DISCHARGE

## 2022-11-01 RX ORDER — SOTALOL HCL 120 MG
1 TABLET ORAL
Qty: 0 | Refills: 0 | DISCHARGE

## 2022-11-01 RX ORDER — APIXABAN 2.5 MG/1
1 TABLET, FILM COATED ORAL
Qty: 60 | Refills: 0
Start: 2022-11-01 | End: 2022-11-30

## 2022-11-01 RX ORDER — INFLUENZA VIRUS VACCINE 15; 15; 15; 15 UG/.5ML; UG/.5ML; UG/.5ML; UG/.5ML
0.7 SUSPENSION INTRAMUSCULAR ONCE
Refills: 0 | Status: DISCONTINUED | OUTPATIENT
Start: 2022-11-01 | End: 2022-11-03

## 2022-11-01 RX ORDER — ASCORBIC ACID 60 MG
1 TABLET,CHEWABLE ORAL
Qty: 0 | Refills: 0 | DISCHARGE

## 2022-11-01 RX ORDER — ASPIRIN/CALCIUM CARB/MAGNESIUM 324 MG
81 TABLET ORAL DAILY
Refills: 0 | Status: DISCONTINUED | OUTPATIENT
Start: 2022-11-01 | End: 2022-11-03

## 2022-11-01 RX ORDER — SOTALOL HCL 120 MG
120 TABLET ORAL EVERY 12 HOURS
Refills: 0 | Status: DISCONTINUED | OUTPATIENT
Start: 2022-11-01 | End: 2022-11-03

## 2022-11-01 RX ORDER — METOPROLOL TARTRATE 50 MG
25 TABLET ORAL
Refills: 0 | Status: DISCONTINUED | OUTPATIENT
Start: 2022-11-01 | End: 2022-11-03

## 2022-11-01 RX ORDER — METOPROLOL TARTRATE 50 MG
1 TABLET ORAL
Qty: 0 | Refills: 0 | DISCHARGE

## 2022-11-01 RX ORDER — SODIUM CHLORIDE 9 MG/ML
3 INJECTION INTRAMUSCULAR; INTRAVENOUS; SUBCUTANEOUS EVERY 8 HOURS
Refills: 0 | Status: DISCONTINUED | OUTPATIENT
Start: 2022-11-01 | End: 2022-11-03

## 2022-11-01 RX ADMIN — SODIUM CHLORIDE 3 MILLILITER(S): 9 INJECTION INTRAMUSCULAR; INTRAVENOUS; SUBCUTANEOUS at 15:12

## 2022-11-01 RX ADMIN — SODIUM CHLORIDE 3 MILLILITER(S): 9 INJECTION INTRAMUSCULAR; INTRAVENOUS; SUBCUTANEOUS at 21:36

## 2022-11-01 RX ADMIN — Medication 25 MILLIGRAM(S): at 18:17

## 2022-11-01 RX ADMIN — Medication 120 MILLIGRAM(S): at 18:49

## 2022-11-01 RX ADMIN — ATORVASTATIN CALCIUM 10 MILLIGRAM(S): 80 TABLET, FILM COATED ORAL at 21:07

## 2022-11-01 NOTE — PROVIDER CONTACT NOTE (MEDICATION) - ASSESSMENT
Pt is s/p VT ablation. As per Dr. Maldonado, would like to restart Eliquis. Eliquis sent to [facility] to check for coverage. Pt is s/p VT ablation. As per Dr. Maldonado, would like to restart Eliquis, which can be started tomorrow morning, 11/2/2022. Eliquis sent to [employer] to check for coverage.

## 2022-11-01 NOTE — PROVIDER CONTACT NOTE (MEDICATION) - RECOMMENDATIONS
As per Tristian from [facility], Eliquis is covered with a $55 copay. As per protocol, will leave as test script for medicine ACP to fill on discharge.

## 2022-11-01 NOTE — PATIENT PROFILE ADULT - FALL HARM RISK - HARM RISK INTERVENTIONS

## 2022-11-02 ENCOUNTER — TRANSCRIPTION ENCOUNTER (OUTPATIENT)
Age: 72
End: 2022-11-02

## 2022-11-02 LAB
ALBUMIN SERPL ELPH-MCNC: 3.9 G/DL — SIGNIFICANT CHANGE UP (ref 3.3–5)
ALP SERPL-CCNC: 32 U/L — LOW (ref 40–120)
ALT FLD-CCNC: 15 U/L — SIGNIFICANT CHANGE UP (ref 4–41)
ANION GAP SERPL CALC-SCNC: 14 MMOL/L — SIGNIFICANT CHANGE UP (ref 7–14)
APTT BLD: 31.1 SEC — SIGNIFICANT CHANGE UP (ref 27–36.3)
AST SERPL-CCNC: 34 U/L — SIGNIFICANT CHANGE UP (ref 4–40)
BASOPHILS # BLD AUTO: 0.05 K/UL — SIGNIFICANT CHANGE UP (ref 0–0.2)
BASOPHILS NFR BLD AUTO: 0.3 % — SIGNIFICANT CHANGE UP (ref 0–2)
BILIRUB SERPL-MCNC: 1.2 MG/DL — SIGNIFICANT CHANGE UP (ref 0.2–1.2)
BUN SERPL-MCNC: 22 MG/DL — SIGNIFICANT CHANGE UP (ref 7–23)
CALCIUM SERPL-MCNC: 8.8 MG/DL — SIGNIFICANT CHANGE UP (ref 8.4–10.5)
CHLORIDE SERPL-SCNC: 97 MMOL/L — LOW (ref 98–107)
CO2 SERPL-SCNC: 23 MMOL/L — SIGNIFICANT CHANGE UP (ref 22–31)
CREAT SERPL-MCNC: 1.12 MG/DL — SIGNIFICANT CHANGE UP (ref 0.5–1.3)
EGFR: 70 ML/MIN/1.73M2 — SIGNIFICANT CHANGE UP
EOSINOPHIL # BLD AUTO: 0.01 K/UL — SIGNIFICANT CHANGE UP (ref 0–0.5)
EOSINOPHIL NFR BLD AUTO: 0.1 % — SIGNIFICANT CHANGE UP (ref 0–6)
GLUCOSE SERPL-MCNC: 91 MG/DL — SIGNIFICANT CHANGE UP (ref 70–99)
HCT VFR BLD CALC: 40.2 % — SIGNIFICANT CHANGE UP (ref 39–50)
HGB BLD-MCNC: 13.4 G/DL — SIGNIFICANT CHANGE UP (ref 13–17)
IANC: 11.22 K/UL — HIGH (ref 1.8–7.4)
IMM GRANULOCYTES NFR BLD AUTO: 0.4 % — SIGNIFICANT CHANGE UP (ref 0–0.9)
INR BLD: 1.24 RATIO — HIGH (ref 0.88–1.16)
LYMPHOCYTES # BLD AUTO: 16.3 % — SIGNIFICANT CHANGE UP (ref 13–44)
LYMPHOCYTES # BLD AUTO: 2.47 K/UL — SIGNIFICANT CHANGE UP (ref 1–3.3)
MAGNESIUM SERPL-MCNC: 2 MG/DL — SIGNIFICANT CHANGE UP (ref 1.6–2.6)
MCHC RBC-ENTMCNC: 30.2 PG — SIGNIFICANT CHANGE UP (ref 27–34)
MCHC RBC-ENTMCNC: 33.3 GM/DL — SIGNIFICANT CHANGE UP (ref 32–36)
MCV RBC AUTO: 90.7 FL — SIGNIFICANT CHANGE UP (ref 80–100)
MONOCYTES # BLD AUTO: 1.39 K/UL — HIGH (ref 0–0.9)
MONOCYTES NFR BLD AUTO: 9.1 % — SIGNIFICANT CHANGE UP (ref 2–14)
NEUTROPHILS # BLD AUTO: 11.22 K/UL — HIGH (ref 1.8–7.4)
NEUTROPHILS NFR BLD AUTO: 73.8 % — SIGNIFICANT CHANGE UP (ref 43–77)
NRBC # BLD: 0 /100 WBCS — SIGNIFICANT CHANGE UP (ref 0–0)
NRBC # FLD: 0 K/UL — SIGNIFICANT CHANGE UP (ref 0–0)
PHOSPHATE SERPL-MCNC: 3.9 MG/DL — SIGNIFICANT CHANGE UP (ref 2.5–4.5)
PLATELET # BLD AUTO: 160 K/UL — SIGNIFICANT CHANGE UP (ref 150–400)
POTASSIUM SERPL-MCNC: 3.5 MMOL/L — SIGNIFICANT CHANGE UP (ref 3.5–5.3)
POTASSIUM SERPL-SCNC: 3.5 MMOL/L — SIGNIFICANT CHANGE UP (ref 3.5–5.3)
PROT SERPL-MCNC: 6.1 G/DL — SIGNIFICANT CHANGE UP (ref 6–8.3)
PROTHROM AB SERPL-ACNC: 14.4 SEC — HIGH (ref 10.5–13.4)
RBC # BLD: 4.43 M/UL — SIGNIFICANT CHANGE UP (ref 4.2–5.8)
RBC # FLD: 13.5 % — SIGNIFICANT CHANGE UP (ref 10.3–14.5)
SODIUM SERPL-SCNC: 134 MMOL/L — LOW (ref 135–145)
WBC # BLD: 15.2 K/UL — HIGH (ref 3.8–10.5)
WBC # FLD AUTO: 15.2 K/UL — HIGH (ref 3.8–10.5)

## 2022-11-02 PROCEDURE — 93010 ELECTROCARDIOGRAM REPORT: CPT

## 2022-11-02 PROCEDURE — 99232 SBSQ HOSP IP/OBS MODERATE 35: CPT

## 2022-11-02 RX ORDER — POTASSIUM CHLORIDE 20 MEQ
40 PACKET (EA) ORAL ONCE
Refills: 0 | Status: COMPLETED | OUTPATIENT
Start: 2022-11-02 | End: 2022-11-02

## 2022-11-02 RX ADMIN — ATORVASTATIN CALCIUM 10 MILLIGRAM(S): 80 TABLET, FILM COATED ORAL at 21:06

## 2022-11-02 RX ADMIN — SODIUM CHLORIDE 3 MILLILITER(S): 9 INJECTION INTRAMUSCULAR; INTRAVENOUS; SUBCUTANEOUS at 13:08

## 2022-11-02 RX ADMIN — APIXABAN 5 MILLIGRAM(S): 2.5 TABLET, FILM COATED ORAL at 20:01

## 2022-11-02 RX ADMIN — Medication 25 MILLIGRAM(S): at 06:24

## 2022-11-02 RX ADMIN — Medication 500 MILLIGRAM(S): at 09:41

## 2022-11-02 RX ADMIN — Medication 120 MILLIGRAM(S): at 19:28

## 2022-11-02 RX ADMIN — SODIUM CHLORIDE 3 MILLILITER(S): 9 INJECTION INTRAMUSCULAR; INTRAVENOUS; SUBCUTANEOUS at 05:43

## 2022-11-02 RX ADMIN — Medication 81 MILLIGRAM(S): at 09:41

## 2022-11-02 RX ADMIN — Medication 25 MILLIGRAM(S): at 17:33

## 2022-11-02 RX ADMIN — Medication 120 MILLIGRAM(S): at 06:40

## 2022-11-02 RX ADMIN — Medication 40 MILLIEQUIVALENT(S): at 09:18

## 2022-11-02 NOTE — DISCHARGE NOTE PROVIDER - NSRESEARCHGRANT_PROPHYLAXISRECOMFT_GEN_A_CORE
87 yo F with PMHx of COPD on 2 L NC, PVD, CKD3, essential HTN, DLD, hx of CVA, ex-smoker, recently discharged from hospital Jan 13 after being admitted for pneumonia, presented for SOB of one day duration
IMPROVE-DD Application Not Available

## 2022-11-02 NOTE — DISCHARGE NOTE PROVIDER - NSDCFUSCHEDAPPT_GEN_ALL_CORE_FT
[facility]  [facility]  ELECTROPH [phone]  Scheduled Appointment: 12/08/2022

## 2022-11-02 NOTE — DISCHARGE NOTE PROVIDER - PROVIDER TOKENS
PROVIDER:[TOKEN:[[id number]]] PROVIDER:[TOKEN:[[id number]]],PROVIDER:[TOKEN:[[id number]]]

## 2022-11-02 NOTE — CHART NOTE - NSCHARTNOTEFT_GEN_A_CORE
Pt is s/p cardiac ablation via right femoral access.  Patient denies pain, numbness, tingling, CP, SOB.     T(C): 36.8 (11-01-22 @ 20:58), Max: 36.8 (11-01-22 @ 18:10)  HR: 65 (11-01-22 @ 20:58) (65 - 82)  BP: 125/67 (11-01-22 @ 20:58) (116/67 - 125/67)  RR: 18 (11-01-22 @ 20:58) (18 - 18)  SpO2: 100% (11-01-22 @ 20:58) (98% - 100%)  VSS.     Dressing soaked in blood intact with no hematoma or swelling.   DP pulse is palpable.   no femoral bruit      -Dressing removed, no active bleeding or discharge at incision sites  -Area cleaned with chlorhexidine swab, redressed with 2x2 gauze and Tegaderm covering in order to track bleeding.   -Will continue to monitor.     Ricky Patel PA-C  Department of Internal Medicine  In-House beeper number [phone]
The patient presents today for elective VT ablation.   see hard copy of H&P form [employer] and PST in patient's chart.   The patient denies chest pain,  palpitations, dizziness, presyncope, syncope,  headache, visual disturbances, CVA, PE, DVT, abdominal pain, N/V/D/C, hematochezia, melena, dysuria, hematuria, fever, chills. He admits to SOB with exertion, not a new symptom and not getting worse.   The patient denies any new complaints since the last time he was seen by Dr. Maldonado.   Medications reviewed.   As per patient, he stopped taking Eliquis 1 year ago. Dr. Maldonado is aware, as per [facility].

## 2022-11-02 NOTE — DISCHARGE NOTE PROVIDER - CARE PROVIDERS DIRECT ADDRESSES
[email or web address]

## 2022-11-02 NOTE — DISCHARGE NOTE PROVIDER - CARE PROVIDER_API CALL
Mert Maldonado)  Cardiac Electrophysiology; Cardiology; Internal Medicine  [phone] [place] [address]  Phone: [phone]  Fax: [phone]  Follow Up Time:    Mert Maldonado)  Cardiac Electrophysiology; Cardiology; Internal Medicine  [address] [place] [address]  Phone: [phone]  Fax: [phone]  Follow Up Time:     SANAZ ARGUELLO  Internal Medicine  [address] [address]  Phone: ()-  Fax: ()-  Follow Up Time:

## 2022-11-02 NOTE — DISCHARGE NOTE PROVIDER - NSDCCPCAREPLAN_GEN_ALL_CORE_FT
PRINCIPAL DISCHARGE DIAGNOSIS  Diagnosis: S/P ablation of atrial fibrillation  Assessment and Plan of Treatment: Discharge instructions reviewed with patient and written instructions given   Can shower with only water at the site; no soap, lotion, cream is to be applied to site ; patient will not put any dressing on the site once the initial dressing is removed  Site will not be submerged under water x 1 week including activites in the pool, bath or jacuzzi   Activity restrictions went over with patient including 1 week of refraining from strenuous activity including jogging, running, or driving   Any indication of infection including fever, chills, redness, swelling or discharge at the site - patient will call office [phone]  For serious symptoms like chest pain, SOB, lightheadedness, dizziness or syncope - patient was directed to the closest ER  All medications and diet reviewed   Follow date and time given 12/8 at 12:15PM with Dr Maldonado       PRINCIPAL DISCHARGE DIAGNOSIS  Diagnosis: S/P ablation of atrial fibrillation  Assessment and Plan of Treatment: Discharge instructions reviewed with patient and written instructions given   Can shower with only water at the site; no soap, lotion, cream is to be applied to site ; patient will not put any dressing on the site once the initial dressing is removed  Site will not be submerged under water x 1 week including activites in the pool, bath or jacuzzi   Activity restrictions went over with patient including 1 week of refraining from strenuous activity including jogging, running, or driving   Any indication of infection including fever, chills, redness, swelling or discharge at the site - patient will call office [phone]  For serious symptoms like chest pain, SOB, lightheadedness, dizziness or syncope - patient was directed to the closest ER  All medications and diet reviewed   Follow date and time given 12/8 at 12:15PM with Dr Maldonado      SECONDARY DISCHARGE DIAGNOSES  Diagnosis: HTN (hypertension)  Assessment and Plan of Treatment: Continue blood pressure medication regimen as directed. Monitor for any visual changes, headaches or dizziness.  Monitor blood pressure regularly.  Follow up with your primary care provider for further management for high blood pressure.    Diagnosis: High cholesterol  Assessment and Plan of Treatment: Continue prescribed medications to control your cholesterol levels and a DASH (Low fat/salt) diet. Follow up with your primary care provider upon discharge for further management and monitoring of cholesterol levels.

## 2022-11-02 NOTE — DISCHARGE NOTE PROVIDER - HOSPITAL COURSE
70M Hx of NICM s/p single lead [employer] AICD, HTN, s/p ablation for AF/AT (5/25/21) s/p VT ablation on 11/1    #arrhythmias  -multifocal PVCs, WCT most consistent with RVOT VT, and numerous episodes of suspected atrial arrhythmia on device interrogation  -Lopressor 25mg PO BID   -c/w sotalol 120mg PO BID  -Resume eliquis 5mg PO BID   -Groin site dressing removed; no hematoma or bleeding noted; no tenderness   -Patient s/p ablation   Discharge instructions reviewed with patient and written instructions given   Can shower with only water at the site; no soap, lotion, cream is to be applied to site ; patient will not put any dressing on the site once the initial dressing is removed  Site will not be submerged under water x 1 week including activites in the pool, bath or jacuzzi   Activity restrictions went over with patient including 1 week of refraining from strenuous activity including jogging, running, or driving   Any indication of infection including fever, chills, redness, swelling or discharge at the site - patient will call office [phone]  For serious symptoms like chest pain, SOB, lightheadedness, dizziness or syncope - patient was directed to the closest ER  All medications and diet reviewed   Follow date and time given 12/8 at 12:15PM with Dr Maldonado     Patient seen and evaluated. Reviewed discharge medications with patient and attending. All new medications requiring new prescriptions were sent to the pharmacy of patient's choice. Reviewed need for prescription for previous home medications and new prescriptions sent if requested. Medically cleared/stable for discharge as per Dr. Maldonado on 11/3/22 with appropriate follow up. Patient understands and agrees with plan of care.

## 2022-11-02 NOTE — PROVIDER CONTACT NOTE (OTHER) - BACKGROUND
patient s/p cath dressing c/d/i +2 radial and pedal pulses, denies any pain numbess or tingling no hematoma/ active bleeding present. small dried drainage noted

## 2022-11-02 NOTE — DISCHARGE NOTE PROVIDER - NSDCMRMEDTOKEN_GEN_ALL_CORE_FT
aspirin 81 mg oral tablet: 1 tab(s) orally once a day  atorvastatin 10 mg oral tablet: 1 tab(s) orally once a day  Clear Eyes: 1 drop(s) to each affected eye once a day, As Needed  Eliquis 5 mg oral tablet: 1 tab(s) orally 2 times a day   lisinopril 10 mg oral tablet: 1 tab(s) orally once a day  magnesium: 1 tab(s) orally once a day  metoprolol tartrate 25 mg oral tablet: 1 tab(s) orally 2 times a day  sotalol 120 mg oral tablet: 1 tab(s) orally 2 times a day  Vitamin C: 1 tab(s) orally once a day   aspirin 81 mg oral tablet: 1 tab(s) orally once a day  atorvastatin 10 mg oral tablet: 1 tab(s) orally once a day  lisinopril 10 mg oral tablet: 1 tab(s) orally once a day  magnesium: 1 tab(s) orally once a day  metoprolol tartrate 25 mg oral tablet: 1 tab(s) orally 2 times a day  sotalol 120 mg oral tablet: 1 tab(s) orally 2 times a day  Vitamin C: 1 tab(s) orally once a day   aspirin 81 mg oral tablet: 1 tab(s) orally once a day  atorvastatin 10 mg oral tablet: 1 tab(s) orally once a day  Eliquis 5 mg oral tablet: 1 tab(s) orally 2 times a day   lisinopril 10 mg oral tablet: 1 tab(s) orally once a day  magnesium: 1 tab(s) orally once a day  metoprolol tartrate 25 mg oral tablet: 1 tab(s) orally 2 times a day  sotalol 120 mg oral tablet: 1 tab(s) orally 2 times a day  Vitamin C: 1 tab(s) orally once a day

## 2022-11-03 ENCOUNTER — TRANSCRIPTION ENCOUNTER (OUTPATIENT)
Age: 72
End: 2022-11-03

## 2022-11-03 VITALS — SYSTOLIC BLOOD PRESSURE: 135 MMHG | HEART RATE: 65 BPM | DIASTOLIC BLOOD PRESSURE: 76 MMHG

## 2022-11-03 PROCEDURE — 93010 ELECTROCARDIOGRAM REPORT: CPT

## 2022-11-03 RX ORDER — NAPHAZOLINE HCL 0.1 %
1 DROPS OPHTHALMIC (EYE)
Qty: 0 | Refills: 0 | DISCHARGE

## 2022-11-03 RX ORDER — APIXABAN 2.5 MG/1
1 TABLET, FILM COATED ORAL
Qty: 60 | Refills: 0
Start: 2022-11-03 | End: 2022-12-02

## 2022-11-03 RX ADMIN — APIXABAN 5 MILLIGRAM(S): 2.5 TABLET, FILM COATED ORAL at 18:33

## 2022-11-03 RX ADMIN — SODIUM CHLORIDE 3 MILLILITER(S): 9 INJECTION INTRAMUSCULAR; INTRAVENOUS; SUBCUTANEOUS at 00:36

## 2022-11-03 RX ADMIN — APIXABAN 5 MILLIGRAM(S): 2.5 TABLET, FILM COATED ORAL at 06:06

## 2022-11-03 RX ADMIN — SODIUM CHLORIDE 3 MILLILITER(S): 9 INJECTION INTRAMUSCULAR; INTRAVENOUS; SUBCUTANEOUS at 12:00

## 2022-11-03 RX ADMIN — Medication 120 MILLIGRAM(S): at 06:49

## 2022-11-03 RX ADMIN — LISINOPRIL 10 MILLIGRAM(S): 2.5 TABLET ORAL at 06:05

## 2022-11-03 RX ADMIN — Medication 25 MILLIGRAM(S): at 16:44

## 2022-11-03 RX ADMIN — Medication 120 MILLIGRAM(S): at 18:33

## 2022-11-03 RX ADMIN — Medication 25 MILLIGRAM(S): at 06:06

## 2022-11-03 RX ADMIN — SODIUM CHLORIDE 3 MILLILITER(S): 9 INJECTION INTRAMUSCULAR; INTRAVENOUS; SUBCUTANEOUS at 06:12

## 2022-11-03 NOTE — PHARMACOTHERAPY INTERVENTION NOTE - COMMENTS
Discharge medications reviewed with the patient. Current medication schedule was discussed in detail including: medication name, indication, dose, administration times, treatment duration, side effects, drug interactions, and special instructions. Patient questions and concerns were answered and addressed. Patient demonstrated understanding.    Patient previously on Eliquis - reinforced indication, directions and side effects.    Kelley Bosch, PharmD, [facility]  Clinical Pharmacy Specialist   [phone]

## 2022-11-03 NOTE — DISCHARGE NOTE NURSING/CASE MANAGEMENT/SOCIAL WORK - NSDCPEFALRISK_GEN_ALL_CORE
For information on Fall & Injury Prevention, visit: [email or web address] OR  [email or web address] OR  https://www.cdc.gov/steadi/patient.html

## 2022-11-03 NOTE — PROGRESS NOTE ADULT - SUBJECTIVE AND OBJECTIVE BOX
Interval History:  No acute events overnight  Telemetry: NSR with PVCs     MEDICATIONS  (STANDING):  apixaban 5 milliGRAM(s) Oral every 12 hours  ascorbic acid 500 milliGRAM(s) Oral daily  aspirin enteric coated 81 milliGRAM(s) Oral daily  atorvastatin 10 milliGRAM(s) Oral at bedtime  influenza  Vaccine (HIGH DOSE) 0.7 milliLiter(s) IntraMuscular once  lisinopril 10 milliGRAM(s) Oral daily  metoprolol tartrate 25 milliGRAM(s) Oral two times a day  sodium chloride 0.9% lock flush 3 milliLiter(s) IV Push every 8 hours  sotalol. 120 milliGRAM(s) Oral every 12 hours    MEDICATIONS  (PRN):  silver sulfADIAZINE 1% Cream 1 Application(s) Topical two times a day PRN chest burning sensation    Vital Signs Last 24 Hrs  T(C): 36.7 (11-02-22 @ 05:10), Max: 36.8 (11-01-22 @ 18:10)  T(F): 98 (11-02-22 @ 05:10), Max: 98.2 (11-01-22 @ 18:10)  HR: 77 (11-02-22 @ 05:10) (65 - 82)  BP: 112/59 (11-02-22 @ 05:10) (112/59 - 125/67)  BP(mean): --  RR: 18 (11-02-22 @ 05:10) (18 - 18)  SpO2: 100% (11-02-22 @ 05:10) (98% - 100%)    Appearance: Normal	  HEENT:   Normal oral mucosa, PERRL, EOMI	  Lymphatic: No lymphadenopathy  Cardiovascular: Normal S1 S2, No JVD, No murmurs, No edema  Respiratory: Lungs clear to auscultation	  Psychiatry: A & O x 3, Mood & affect appropriate  Gastrointestinal:  Soft, Non-tender, + BS	  Skin: No rashes, No ecchymoses, No cyanosis	  Neurologic: Non-focal  Extremities: Normal range of motion, No clubbing, cyanosis or edema  Vascular: Peripheral pulses palpable 2+ bilaterally    LABS:	 	    CBC Full  -  ( 02 Nov 2022 06:50 )  WBC Count : 15.20 K/uL  Hemoglobin : 13.4 g/dL  Hematocrit : 40.2 %  Platelet Count - Automated : 160 K/uL  Mean Cell Volume : 90.7 fL  Mean Cell Hemoglobin : 30.2 pg  Mean Cell Hemoglobin Concentration : 33.3 gm/dL  Auto Neutrophil # : 11.22 K/uL  Auto Lymphocyte # : 2.47 K/uL  Auto Monocyte # : 1.39 K/uL  Auto Eosinophil # : 0.01 K/uL  Auto Basophil # : 0.05 K/uL  Auto Neutrophil % : 73.8 %  Auto Lymphocyte % : 16.3 %  Auto Monocyte % : 9.1 %  Auto Eosinophil % : 0.1 %  Auto Basophil % : 0.3 %    11-02    134<L>  |  97<L>  |  22  ----------------------------<  91  3.5   |  23  |  1.12    Ca    8.8      02 Nov 2022 06:50  Phos  3.9     11-02  Mg     2.00     11-02    TPro  6.1  /  Alb  3.9  /  TBili  1.2  /  DBili  x   /  AST  34  /  ALT  15  /  AlkPhos  32<L>  11-02    PT/INR - ( 02 Nov 2022 06:50 )   PT: 14.4 sec;   INR: 1.24 ratio         PTT - ( 02 Nov 2022 06:50 )  PTT:31.1 sec  LIVER FUNCTIONS - ( 02 Nov 2022 06:50 )  Alb: 3.9 g/dL / Pro: 6.1 g/dL / ALK PHOS: 32 U/L / ALT: 15 U/L / AST: 34 U/L / GGT: x                   
Patient states he is feeling better this morning but still aware of frequent PVC's. Feels the irregularity. No chest pain, shortness of breath, palpitations or lightheadedness.   Minimal right groin tenderness. No bleeding or hematoma.     Vital Signs Last 24 Hrs  T(C): 36.9 (03 Nov 2022 06:11), Max: 37 (02 Nov 2022 23:36)  T(F): 98.4 (03 Nov 2022 06:11), Max: 98.6 (02 Nov 2022 23:36)  HR: 69 (03 Nov 2022 06:11) (60 - 69)  BP: 149/87 (03 Nov 2022 06:11) (138/68 - 149/87)  BP(mean): --  RR: 18 (03 Nov 2022 06:11) (16 - 18)  SpO2: 100% (03 Nov 2022 06:11) (100% - 100%)    Parameters below as of 03 Nov 2022 06:11  Patient On (Oxygen Delivery Method): room air          EKG: Sinus rhythm with frequent PVC's QRSd 120ms   Telemetry: Normal sinus rhythm with frequent multifocal PVC's, bigeminy and triplets.     MEDICATIONS  (STANDING):  apixaban 5 milliGRAM(s) Oral every 12 hours  ascorbic acid 500 milliGRAM(s) Oral daily  aspirin enteric coated 81 milliGRAM(s) Oral daily  atorvastatin 10 milliGRAM(s) Oral at bedtime  influenza  Vaccine (HIGH DOSE) 0.7 milliLiter(s) IntraMuscular once  lisinopril 10 milliGRAM(s) Oral daily  metoprolol tartrate 25 milliGRAM(s) Oral two times a day  sodium chloride 0.9% lock flush 3 milliLiter(s) IV Push every 8 hours  sotalol. 120 milliGRAM(s) Oral every 12 hours    MEDICATIONS  (PRN):  silver sulfADIAZINE 1% Cream 1 Application(s) Topical two times a day PRN chest burning sensation          Physical exam:   Gen- well developed well nourished NAD.   Resp- clear to auscultation. No wheezing, rales or rhonchi  CV- S1 and S2 irregular. No murmurs, gallops or rubs  ABD- soft nontender, nondistended + bowel sounds   EXT-no edema no calf tenderness. pedal pulses strong and equal. Right groin access site with small amount ecchymosis medially. No hematoma or bleeding.   Neuro- grossly nonfocal                            13.4   15.20 )-----------( 160      ( 02 Nov 2022 06:50 )             40.2     PT/INR - ( 02 Nov 2022 06:50 )   PT: 14.4 sec;   INR: 1.24 ratio         PTT - ( 02 Nov 2022 06:50 )  PTT:31.1 sec  11-02    134<L>  |  97<L>  |  22  ----------------------------<  91  3.5   |  23  |  1.12    Ca    8.8      02 Nov 2022 06:50  Phos  3.9     11-02  Mg     2.00     11-02    TPro  6.1  /  Alb  3.9  /  TBili  1.2  /  DBili  x   /  AST  34  /  ALT  15  /  AlkPhos  32<L>  11-02        < from: Transthoracic Echocardiogram (03.05.21 @ 14:30) >  Patient name: LATOSHA SEGOVIA  YOB: 1950   Age: 70 (M)   MR#: [id number]  Study Date: 3/5/2021  Location: O/PSonographer: Manpreet Hernandez [facility]  Study quality: Technically Fair  Referring Physician: Not Available Doctor, MD  Blood Pressure: 174/87 mmHg  Height: 183 cm  Weight: 77 kg  BSA: 2 m2  ------------------------------------------------------------------------  PROCEDURE: Transthoracic echocardiogram with 2-D, M-Mode  and complete spectral and color flow Doppler.  INDICATION: Palpitations (R00.2)  ------------------------------------------------------------------------  DIMENSIONS:  Dimensions:     Normal Values:  LA:     4.5 cm    2.0 - 4.0 cm  Ao:     3.8 cm    2.0 - 3.8 cm  SEPTUM: 0.9 cm    0.6 - 1.2 cm  PWT:    0.9cm    0.6 - 1.1 cm  LVIDd:  5.7 cm    3.0 - 5.6 cm  LVIDs:  3.9 cm    1.8 - 4.0 cm  Derived Variables:  LVMI: 99 g/m2  RWT: 0.31  Fractional short: 32 %  Ejection Fraction (Teicholtz): 59 %  ------------------------------------------------------------------------  OBSERVATIONS:  Mitral Valve: Bileaflet mitral valve prolapse.  Mild-moderate mitral regurgitation.  Aortic Root: Normal aortic root.  Aortic Valve: Calcified trileaflet aortic valve with normal  opening. Mild aortic regurgitation.  LeftAtrium: Mildly dilated left atrium.  LA volume index =  38 cc/m2.  Left Ventricle: Normal left ventricular systolic function.  No segmental wall motion abnormalities. Normal left  ventricular internal dimensions and wall thicknesses. Mild  diastolic dysfunction (Stage I).  Right Heart: Normal right atrium. Normal right ventricular  size and function.  A device wire is noted in the right  heart. Normal tricuspid valve. Mild tricuspid  regurgitation. Normal pulmonic valve. Minimal pulmonic  regurgitation.  Pericardium/PleuraNormal pericardium with no pericardial  effusion.  ------------------------------------------------------------------------  CONCLUSIONS:  1. Bileaflet mitral valve prolapse. Mild-moderate mitral  regurgitation.  2. Calcified trileaflet aortic valve with normal opening.  Mild aortic regurgitation.  3. Mildly dilated left atrium.  LA volume index = 38 cc/m2.  4. Normal left ventricular internal dimensions and wall  thicknesses.  5. Normal left ventricular systolic function. No segmental  wall motion abnormalities.  6. Mild diastolic dysfunction (Stage I).  7. Normal right ventricular size and function.  A device  wire is noted in the right heart.  ------------------------------------------------------------------------  Confirmed on  3/5/2021 - 16:12:22 by Donald De La Rosa M.D.,  [facility], [place]  ------------------------------------------------------------------------      >

## 2022-11-03 NOTE — DISCHARGE NOTE NURSING/CASE MANAGEMENT/SOCIAL WORK - PATIENT PORTAL LINK FT
You can access the FollowMyHealth Patient Portal offered by [facility] by registering at the following website: [email or web address]. By joining [facility]’s FollowMyHealth portal, you will also be able to view your health information using other applications (apps) compatible with our system.

## 2022-11-03 NOTE — PROGRESS NOTE ADULT - ASSESSMENT
70M Hx of NICM s/p single lead [employer] AICD, HTN, s/p ablation for AF/AT (5/25/21) s/p VT ablation on 11/1    #arrhythmias  -multifocal PVCs, WCT most consistent with RVOT VT, and numerous episodes of suspected atrial arrhythmia on device interrogation  -Lopressor 25mg PO BID   -c/w sotalol 120mg PO BID  -Resume eliquis 5mg PO BID   -Groin site dressing removed; no hematoma or bleeding noted; no tenderness   -Patient s/p ablation   Discharge instructions reviewed with patient and written instructions given   Can shower with only water at the site; no soap, lotion, cream is to be applied to site ; patient will not put any dressing on the site once the initial dressing is removed  Site will not be submerged under water x 1 week including activites in the pool, bath or jacuzzi   Activity restrictions went over with patient including 1 week of refraining from strenuous activity including jogging, running, or driving   Any indication of infection including fever, chills, redness, swelling or discharge at the site - patient will call office [phone]  For serious symptoms like chest pain, SOB, lightheadedness, dizziness or syncope - patient was directed to the closest ER  All medications and diet reviewed   Follow date and time given 12/8 at 12:15PM with Dr Maldonado   
70M Hx of HTN, HLD, NICM, CAC s/p single lead [employer] AICD 2013  s/p ablation for AF/AT (5/25/21) s/p VT ablation on 11/1/22.   Continues to have multifocal PVC's on telemetry/EKG. Preserved LVEF. On Sotalol 120mg bid, metoprolol 25mg bid and Eliquis 5mg bid     -Groin site dressing removed; small amount ecchymosis. No hematoma or bleeding noted; no tenderness   Discharge instructions reviewed with patient and written instructions given   Can shower with only water at the site; no soap, lotion, cream is to be applied to site ; patient will not put any dressing on the site once the initial dressing is removed  Site will not be submerged under water x 1 week including activites in the pool, bath or jacuzzi   Activity restrictions went over with patient including 1 week of refraining from strenuous activity including jogging, running, or driving   Any indication of infection including fever, chills, redness, swelling or discharge at the site - patient will call office [phone]  For serious symptoms like chest pain, SOB, lightheadedness, dizziness or syncope - patient was directed to the closest ER  All medications and diet reviewed   Follow date and time given 12/8 at 12:15PM with Dr Maldonado   Likely discharge today.

## 2022-12-08 ENCOUNTER — NON-APPOINTMENT (OUTPATIENT)
Age: 72
End: 2022-12-08

## 2022-12-08 ENCOUNTER — APPOINTMENT (OUTPATIENT)
Dept: ELECTROPHYSIOLOGY | Facility: CLINIC | Age: 72
End: 2022-12-08

## 2022-12-08 VITALS
WEIGHT: 150 LBS | HEIGHT: 71 IN | OXYGEN SATURATION: 100 % | DIASTOLIC BLOOD PRESSURE: 79 MMHG | BODY MASS INDEX: 21 KG/M2 | SYSTOLIC BLOOD PRESSURE: 111 MMHG | HEART RATE: 57 BPM

## 2022-12-08 DIAGNOSIS — Z86.79 OTHER SPECIFIED POSTPROCEDURAL STATES: ICD-10-CM

## 2022-12-08 DIAGNOSIS — Z98.890 OTHER SPECIFIED POSTPROCEDURAL STATES: ICD-10-CM

## 2022-12-08 PROBLEM — E78.5 HYPERLIPIDEMIA, UNSPECIFIED: Chronic | Status: ACTIVE | Noted: 2022-10-20

## 2022-12-08 PROCEDURE — 93282 PRGRMG EVAL IMPLANTABLE DFB: CPT

## 2022-12-08 PROCEDURE — 99215 OFFICE O/P EST HI 40 MIN: CPT | Mod: 25

## 2022-12-08 PROCEDURE — 93000 ELECTROCARDIOGRAM COMPLETE: CPT | Mod: 59

## 2022-12-08 RX ORDER — METOPROLOL TARTRATE 25 MG/1
25 TABLET, FILM COATED ORAL
Qty: 180 | Refills: 0 | Status: DISCONTINUED | COMMUNITY
Start: 2022-10-06 | End: 2022-12-08

## 2022-12-09 NOTE — CARDIOLOGY SUMMARY
[de-identified] : 3/2021: Bileaflet mitral valve prolapse. Mild-moderate mitral regurgitation. Calcified trileaflet aortic valve with normal opening.Mild aortic regurgitation. Mildly dilated left atrium. LA volume index = 38 cc/m2. Normal left ventricular internal dimensions and wall thicknesses. Normal left ventricular systolic function. No segmental wall motion abnormalities. Mild diastolic dysfunction (Stage I). Normal right ventricular size and function. A device wire is noted in the right heart. LVEF 59%.

## 2022-12-09 NOTE — HISTORY OF PRESENT ILLNESS
[FreeTextEntry1] : Henrik Bauer is a 73y/o man with Hx of HTN, HLD, NICM with CAC and noted LVEF 30% s/p ICD placement (2013) and now with recurrent SVT/AT s/p ICD shocks, on Sotalol, and now s/p AT ablation on 5/25/2021 and VT now s/p periaortic VT ablation on 11/1/2022 who presents today for routine f/u post ablation. Post procedure, had extensive bruising and held Eliquis, now improving. Has been having some feeling lightheaded. Denies chest pain, palpitations, SOB, syncope or near syncope. Device checked today in office and reveals no VT since ablation. Will stop metoprolol at this time.

## 2022-12-09 NOTE — DISCUSSION/SUMMARY
[EKG obtained to assist in diagnosis and management of assessed problem(s)] : EKG obtained to assist in diagnosis and management of assessed problem(s) [FreeTextEntry1] : Impression:\par \par 1. VT: s/p ICD shock for VT x2. Hx of VT and ICD shock while hospitalized in the past and also with recurrent NSVT and PVCs. Now s/p periaortic VT ablation on 11/1/2022. No further VT or ICD shocks since ablation. Resume Sotalol as prescribed. Will discontinue metoprolol given adverse effects with use. \par \par 2. AT/aflutter: s/p ablation on 5/25/2021. EKG performed today to assess for recurrent tachyarrhythmias and conduction disease and reveals NSR with PVCs, QTc WNL. Resume Sotalol 120mg BID as prescribed. Resume Eliquis for thromboembolic prophylaxis. \par \par 3. HTN: resume oral antihypertensives as prescribed. Encouraged heart healthy diet, sodium restriction, and weight loss. Continue regular f/u with Cardiologist for further HTN management.\par \par 4. HLD: resume statin therapy as prescribed and regular f/u with Cardiologist for routine lipid monitoring and management.\par \par 5. NICM: s/p ICD placement. Last remote monitoring of device reviewed and revealed normal ICD functioning. No further ICD shocks for AT or VT. Last ECHO with LVEF improved to 59%. Resume routine device checks and remote monitoring as scheduled. Resume OMT as prescribed, encouraged heart healthy diet, daily weight, and regular f/u with Cardiology/Heart failure team as scheduled.\par \par Continue f/u with Cardiologist and RTO for f/u in 6 months.

## 2023-03-09 ENCOUNTER — APPOINTMENT (OUTPATIENT)
Dept: ELECTROPHYSIOLOGY | Facility: CLINIC | Age: 73
End: 2023-03-09
Payer: COMMERCIAL

## 2023-03-09 ENCOUNTER — NON-APPOINTMENT (OUTPATIENT)
Age: 73
End: 2023-03-09

## 2023-03-09 PROCEDURE — 93296 REM INTERROG EVL PM/IDS: CPT

## 2023-03-09 PROCEDURE — 93295 DEV INTERROG REMOTE 1/2/MLT: CPT

## 2023-04-10 ENCOUNTER — APPOINTMENT (OUTPATIENT)
Dept: ELECTROPHYSIOLOGY | Facility: CLINIC | Age: 73
End: 2023-04-10
Payer: COMMERCIAL

## 2023-04-10 ENCOUNTER — NON-APPOINTMENT (OUTPATIENT)
Age: 73
End: 2023-04-10

## 2023-04-10 VITALS
SYSTOLIC BLOOD PRESSURE: 170 MMHG | TEMPERATURE: 97 F | HEIGHT: 71 IN | BODY MASS INDEX: 19.74 KG/M2 | DIASTOLIC BLOOD PRESSURE: 100 MMHG | WEIGHT: 141 LBS | OXYGEN SATURATION: 97 % | HEART RATE: 68 BPM

## 2023-04-10 DIAGNOSIS — Z98.890 OTHER SPECIFIED POSTPROCEDURAL STATES: ICD-10-CM

## 2023-04-10 DIAGNOSIS — Z86.79 OTHER SPECIFIED POSTPROCEDURAL STATES: ICD-10-CM

## 2023-04-10 DIAGNOSIS — I48.92 UNSPECIFIED ATRIAL FLUTTER: ICD-10-CM

## 2023-04-10 PROCEDURE — 93000 ELECTROCARDIOGRAM COMPLETE: CPT

## 2023-04-10 PROCEDURE — 99215 OFFICE O/P EST HI 40 MIN: CPT | Mod: 25

## 2023-04-10 RX ORDER — APIXABAN 5 MG/1
5 TABLET, FILM COATED ORAL
Qty: 180 | Refills: 3 | Status: DISCONTINUED | COMMUNITY
Start: 2021-03-11 | End: 2023-04-10

## 2023-04-10 NOTE — CARDIOLOGY SUMMARY
[de-identified] : 3/2021: Bileaflet mitral valve prolapse. Mild-moderate mitral regurgitation. Calcified trileaflet aortic valve with normal opening.Mild aortic regurgitation. Mildly dilated left atrium. LA volume index = 38 cc/m2. Normal left ventricular internal dimensions and wall thicknesses. Normal left ventricular systolic function. No segmental wall motion abnormalities. Mild diastolic dysfunction (Stage I). Normal right ventricular size and function. A device wire is noted in the right heart. LVEF 59%.

## 2023-04-10 NOTE — DISCUSSION/SUMMARY
[EKG obtained to assist in diagnosis and management of assessed problem(s)] : EKG obtained to assist in diagnosis and management of assessed problem(s) [FreeTextEntry1] : Impression:\par \par 1. VT: s/p ICD shock for VT x2. Hx of VT and ICD shock while hospitalized in the past and also with recurrent NSVT and PVCs. Now s/p periaortic VT ablation on 11/1/2022. No further VT or ICD shocks since ablation. Resume Sotalol as prescribed.\par \par 2. AT/aflutter: s/p ablation on 5/25/2021. EKG performed today to assess for recurrent tachyarrhythmias and conduction disease and reveals NSR with PVCs, QTc WNL. Resume Sotalol 120mg BID as prescribed. No recurrent AT or afib noted. Concern for some bleeding, may discontinue Eliqius at this time. Verbalizes understanding of thromboembolic risk without use. \par \par 3. HTN: resume oral antihypertensives as prescribed. Encouraged heart healthy diet, sodium restriction, and weight loss. Continue regular f/u with Cardiologist for further HTN management.\par \par 4. HLD: resume statin therapy as prescribed and regular f/u with Cardiologist for routine lipid monitoring and management.\par \par 5. NICM: s/p ICD placement. Last remote monitoring of device reviewed and revealed normal ICD functioning. No further ICD shocks for AT or VT. ECHO in 2021 with LVEF improved to 59%. Now recent ECHO 1/2023 with LVEF 32%. Reviewed recent blood work which revealed elevated proBNP to the 2000s. Likely starting Entresto in near future. Resume routine device checks and remote monitoring as scheduled. Resume OMT as prescribed, encouraged heart healthy diet, daily weight, and regular f/u with Cardiology/Heart failure team as scheduled.\par \par Continue f/u with Cardiologist and RTO for f/u in 6 months.

## 2023-04-10 NOTE — HISTORY OF PRESENT ILLNESS
[FreeTextEntry1] : Henrik Bauer is a 73y/o man with Hx of HTN, HLD, NICM with CAC and noted LVEF 30% s/p ICD placement (2013) and now with recurrent SVT/AT s/p ICD shocks, on Sotalol, and now s/p AT ablation on 5/25/2021 and VT now s/p periaortic VT ablation on 11/1/2022 who presents today for routine f/u. Post procedure, had extensive bruising and held Eliquis, now improving. Feeling well at present. Still struggles with occasional feeling lightheaded. Saw Cardiologist and was noted to have an elevated pro BNP on blood work. May be starting Entresto in near future. Denies chest pain, palpitations, SOB, syncope or near syncope. Recent remote device check reveals no sustained VT since ablation, one brief NSVT. Remains on Sotalol 120 BID.

## 2023-06-09 ENCOUNTER — NON-APPOINTMENT (OUTPATIENT)
Age: 73
End: 2023-06-09

## 2023-06-09 ENCOUNTER — APPOINTMENT (OUTPATIENT)
Dept: ELECTROPHYSIOLOGY | Facility: CLINIC | Age: 73
End: 2023-06-09
Payer: COMMERCIAL

## 2023-06-09 PROCEDURE — 93295 DEV INTERROG REMOTE 1/2/MLT: CPT

## 2023-06-09 PROCEDURE — 93296 REM INTERROG EVL PM/IDS: CPT

## 2023-09-14 ENCOUNTER — APPOINTMENT (OUTPATIENT)
Dept: ELECTROPHYSIOLOGY | Facility: CLINIC | Age: 73
End: 2023-09-14
Payer: COMMERCIAL

## 2023-09-14 ENCOUNTER — NON-APPOINTMENT (OUTPATIENT)
Age: 73
End: 2023-09-14

## 2023-09-14 PROCEDURE — 93295 DEV INTERROG REMOTE 1/2/MLT: CPT

## 2023-09-14 PROCEDURE — 93296 REM INTERROG EVL PM/IDS: CPT

## 2023-10-30 ENCOUNTER — NON-APPOINTMENT (OUTPATIENT)
Age: 73
End: 2023-10-30

## 2023-10-30 ENCOUNTER — APPOINTMENT (OUTPATIENT)
Dept: ELECTROPHYSIOLOGY | Facility: CLINIC | Age: 73
End: 2023-10-30
Payer: COMMERCIAL

## 2023-10-30 VITALS
DIASTOLIC BLOOD PRESSURE: 91 MMHG | BODY MASS INDEX: 19.74 KG/M2 | HEART RATE: 57 BPM | HEIGHT: 71 IN | RESPIRATION RATE: 14 BRPM | SYSTOLIC BLOOD PRESSURE: 157 MMHG | TEMPERATURE: 98.1 F | WEIGHT: 141 LBS

## 2023-10-30 DIAGNOSIS — Z86.79 OTHER SPECIFIED POSTPROCEDURAL STATES: ICD-10-CM

## 2023-10-30 DIAGNOSIS — Z98.890 OTHER SPECIFIED POSTPROCEDURAL STATES: ICD-10-CM

## 2023-10-30 PROCEDURE — 93000 ELECTROCARDIOGRAM COMPLETE: CPT | Mod: 59

## 2023-10-30 PROCEDURE — 93282 PRGRMG EVAL IMPLANTABLE DFB: CPT

## 2023-10-30 PROCEDURE — 99214 OFFICE O/P EST MOD 30 MIN: CPT | Mod: 25

## 2023-10-30 RX ORDER — SACUBITRIL AND VALSARTAN 24; 26 MG/1; MG/1
24-26 TABLET, FILM COATED ORAL TWICE DAILY
Refills: 0 | Status: ACTIVE | COMMUNITY

## 2024-01-29 ENCOUNTER — NON-APPOINTMENT (OUTPATIENT)
Age: 74
End: 2024-01-29

## 2024-01-29 ENCOUNTER — APPOINTMENT (OUTPATIENT)
Dept: ELECTROPHYSIOLOGY | Facility: CLINIC | Age: 74
End: 2024-01-29
Payer: COMMERCIAL

## 2024-01-29 ENCOUNTER — APPOINTMENT (OUTPATIENT)
Dept: ELECTROPHYSIOLOGY | Facility: CLINIC | Age: 74
End: 2024-01-29

## 2024-01-29 PROCEDURE — 93295 DEV INTERROG REMOTE 1/2/MLT: CPT

## 2024-01-29 PROCEDURE — 93296 REM INTERROG EVL PM/IDS: CPT

## 2024-04-08 ENCOUNTER — APPOINTMENT (OUTPATIENT)
Dept: ELECTROPHYSIOLOGY | Facility: CLINIC | Age: 74
End: 2024-04-08
Payer: COMMERCIAL

## 2024-04-08 ENCOUNTER — NON-APPOINTMENT (OUTPATIENT)
Age: 74
End: 2024-04-08

## 2024-04-08 VITALS
DIASTOLIC BLOOD PRESSURE: 92 MMHG | SYSTOLIC BLOOD PRESSURE: 160 MMHG | HEIGHT: 71 IN | BODY MASS INDEX: 19.74 KG/M2 | WEIGHT: 141 LBS | TEMPERATURE: 98 F | HEART RATE: 61 BPM | OXYGEN SATURATION: 100 %

## 2024-04-08 DIAGNOSIS — I47.20 VENTRICULAR TACHYCARDIA, UNSPECIFIED: ICD-10-CM

## 2024-04-08 DIAGNOSIS — I48.0 PAROXYSMAL ATRIAL FIBRILLATION: ICD-10-CM

## 2024-04-08 DIAGNOSIS — I10 ESSENTIAL (PRIMARY) HYPERTENSION: ICD-10-CM

## 2024-04-08 DIAGNOSIS — I42.8 OTHER CARDIOMYOPATHIES: ICD-10-CM

## 2024-04-08 DIAGNOSIS — Z79.899 ENCOUNTER FOR THERAPEUTIC DRUG LVL MONITORING: ICD-10-CM

## 2024-04-08 DIAGNOSIS — Z95.810 PRESENCE OF AUTOMATIC (IMPLANTABLE) CARDIAC DEFIBRILLATOR: ICD-10-CM

## 2024-04-08 DIAGNOSIS — Z51.81 ENCOUNTER FOR THERAPEUTIC DRUG LVL MONITORING: ICD-10-CM

## 2024-04-08 DIAGNOSIS — E78.5 HYPERLIPIDEMIA, UNSPECIFIED: ICD-10-CM

## 2024-04-08 PROCEDURE — 93000 ELECTROCARDIOGRAM COMPLETE: CPT

## 2024-04-08 PROCEDURE — 99214 OFFICE O/P EST MOD 30 MIN: CPT | Mod: 25

## 2024-04-08 NOTE — HISTORY OF PRESENT ILLNESS
[FreeTextEntry1] : Henrik Bauer is a 74y/o man with Hx of HTN, HLD, NICM with CAC and noted LVEF 30% s/p ICD placement (2013) and now with recurrent SVT/AT s/p ICD shocks, on Sotalol, and now s/p AT ablation on 5/25/2021 and VT now s/p periaortic VT ablation on 11/1/2022 who presents today for routine f/u. Post procedure, had extensive bruising and held Eliquis, now improving. Feeling well at present. Still struggles with occasional feeling lightheaded. On prior visit, he saw Cardiologist and was noted to have an elevated pro BNP on blood work, now on Entresto. Denies chest pain, palpitations, SOB, syncope or near syncope. Recent remote device check reveals no sustained VT since ablation, brief NSVT. Remains on Sotalol 120 BID.

## 2024-04-08 NOTE — DISCUSSION/SUMMARY
[EKG obtained to assist in diagnosis and management of assessed problem(s)] : EKG obtained to assist in diagnosis and management of assessed problem(s) [FreeTextEntry1] : Impression:  1. VT: s/p ICD shock for VT x2. Hx of VT and ICD shock while hospitalized in the past and also with recurrent NSVT and PVCs. Now s/p periaortic VT ablation on 11/1/2022. No further VT or ICD shocks since ablation. Resume Sotalol as prescribed.  2. AT/aflutter: s/p ablation on 5/25/2021. EKG performed today to assess for recurrent tachyarrhythmias and conduction disease and reveals NSR with PVC, QTc WNL. Resume Sotalol 120mg BID as prescribed. No recurrent AT or afib noted. Remains off Eliquis given bleeding issues in past. Verbalizes understanding of thromboembolic risk without use.   3. HTN: resume oral antihypertensives as prescribed. Encouraged heart healthy diet, sodium restriction, and weight loss. Continue regular f/u with Cardiologist for further HTN management.  4. HLD: resume statin therapy as prescribed and regular f/u with Cardiologist for routine lipid monitoring and management.  5. NICM: s/p ICD placement. Last remote monitoring of device reviewed and revealed normal ICD functioning. No further ICD shocks for AT or VT. ECHO in 2021 with LVEF improved to 59%. Now recent ECHO 1/2023 with LVEF 32%. Resume routine device checks and remote monitoring as scheduled. Resume OMT as prescribed, encouraged heart healthy diet, daily weight, and regular f/u with Cardiology/Heart failure team as scheduled.  Continue f/u with Cardiologist and RTO for f/u in 6 months.

## 2024-04-08 NOTE — CARDIOLOGY SUMMARY
[de-identified] : 3/2021: Bileaflet mitral valve prolapse. Mild-moderate mitral regurgitation. Calcified trileaflet aortic valve with normal opening.Mild aortic regurgitation. Mildly dilated left atrium. LA volume index = 38 cc/m2. Normal left ventricular internal dimensions and wall thicknesses. Normal left ventricular systolic function. No segmental wall motion abnormalities. Mild diastolic dysfunction (Stage I). Normal right ventricular size and function. A device wire is noted in the right heart. LVEF 59%.

## 2024-06-19 NOTE — ED PROVIDER NOTE - CARDIAC MURMUR
"Pt called and left a message regarding \"information\" with our automatic system. He can be reached at the number on file.  " [employer]

## 2024-07-08 ENCOUNTER — APPOINTMENT (OUTPATIENT)
Dept: ELECTROPHYSIOLOGY | Facility: CLINIC | Age: 74
End: 2024-07-08
Payer: COMMERCIAL

## 2024-07-08 ENCOUNTER — NON-APPOINTMENT (OUTPATIENT)
Age: 74
End: 2024-07-08

## 2024-07-08 PROCEDURE — 93296 REM INTERROG EVL PM/IDS: CPT

## 2024-07-08 PROCEDURE — 93295 DEV INTERROG REMOTE 1/2/MLT: CPT

## 2024-09-19 NOTE — PATIENT PROFILE ADULT - NSPROPASSIVESMOKEEXPOSURE_GEN_A_NUR
Relayed results to patient. Patient verbalized understanding and denies questions at this time.     No

## 2024-10-24 ENCOUNTER — APPOINTMENT (OUTPATIENT)
Dept: ELECTROPHYSIOLOGY | Facility: CLINIC | Age: 74
End: 2024-10-24
Payer: COMMERCIAL

## 2024-10-24 ENCOUNTER — NON-APPOINTMENT (OUTPATIENT)
Age: 74
End: 2024-10-24

## 2024-10-24 VITALS
BODY MASS INDEX: 19.74 KG/M2 | OXYGEN SATURATION: 99 % | HEIGHT: 71 IN | DIASTOLIC BLOOD PRESSURE: 88 MMHG | WEIGHT: 141 LBS | SYSTOLIC BLOOD PRESSURE: 156 MMHG | HEART RATE: 71 BPM

## 2024-10-24 DIAGNOSIS — I47.20 VENTRICULAR TACHYCARDIA, UNSPECIFIED: ICD-10-CM

## 2024-10-24 DIAGNOSIS — I48.0 PAROXYSMAL ATRIAL FIBRILLATION: ICD-10-CM

## 2024-10-24 DIAGNOSIS — I42.8 OTHER CARDIOMYOPATHIES: ICD-10-CM

## 2024-10-24 DIAGNOSIS — E78.5 HYPERLIPIDEMIA, UNSPECIFIED: ICD-10-CM

## 2024-10-24 DIAGNOSIS — Z51.81 ENCOUNTER FOR THERAPEUTIC DRUG LVL MONITORING: ICD-10-CM

## 2024-10-24 DIAGNOSIS — Z95.810 PRESENCE OF AUTOMATIC (IMPLANTABLE) CARDIAC DEFIBRILLATOR: ICD-10-CM

## 2024-10-24 DIAGNOSIS — I10 ESSENTIAL (PRIMARY) HYPERTENSION: ICD-10-CM

## 2024-10-24 DIAGNOSIS — Z79.899 ENCOUNTER FOR THERAPEUTIC DRUG LVL MONITORING: ICD-10-CM

## 2024-10-24 PROCEDURE — 99214 OFFICE O/P EST MOD 30 MIN: CPT | Mod: 25

## 2024-10-24 PROCEDURE — 93000 ELECTROCARDIOGRAM COMPLETE: CPT

## 2024-10-24 RX ORDER — EMPAGLIFLOZIN 25 MG/1
25 TABLET, FILM COATED ORAL
Qty: 90 | Refills: 3 | Status: ACTIVE | COMMUNITY
Start: 2024-10-24

## 2025-01-23 ENCOUNTER — APPOINTMENT (OUTPATIENT)
Dept: ELECTROPHYSIOLOGY | Facility: CLINIC | Age: 75
End: 2025-01-23

## 2025-04-10 ENCOUNTER — APPOINTMENT (OUTPATIENT)
Dept: ELECTROPHYSIOLOGY | Facility: CLINIC | Age: 75
End: 2025-04-10